# Patient Record
Sex: MALE | Race: WHITE | NOT HISPANIC OR LATINO | URBAN - METROPOLITAN AREA
[De-identification: names, ages, dates, MRNs, and addresses within clinical notes are randomized per-mention and may not be internally consistent; named-entity substitution may affect disease eponyms.]

---

## 2020-06-06 ENCOUNTER — INPATIENT (INPATIENT)
Facility: HOSPITAL | Age: 85
LOS: 5 days | Discharge: NURSING, REHAB OR LONG-TERM CARE | End: 2020-06-12
Attending: INTERNAL MEDICINE | Admitting: INTERNAL MEDICINE
Payer: MEDICARE

## 2020-06-06 VITALS
SYSTOLIC BLOOD PRESSURE: 123 MMHG | HEART RATE: 105 BPM | OXYGEN SATURATION: 97 % | RESPIRATION RATE: 18 BRPM | DIASTOLIC BLOOD PRESSURE: 68 MMHG

## 2020-06-06 DIAGNOSIS — Z95.1 PRESENCE OF AORTOCORONARY BYPASS GRAFT: Chronic | ICD-10-CM

## 2020-06-06 LAB
ALBUMIN SERPL ELPH-MCNC: 4.4 G/DL — SIGNIFICANT CHANGE UP (ref 3.5–5.2)
ALP SERPL-CCNC: 98 U/L — SIGNIFICANT CHANGE UP (ref 30–115)
ALT FLD-CCNC: 9 U/L — SIGNIFICANT CHANGE UP (ref 0–41)
ANION GAP SERPL CALC-SCNC: 14 MMOL/L — SIGNIFICANT CHANGE UP (ref 7–14)
APPEARANCE UR: CLEAR — SIGNIFICANT CHANGE UP
APTT BLD: 28.8 SEC — SIGNIFICANT CHANGE UP (ref 27–39.2)
AST SERPL-CCNC: 14 U/L — SIGNIFICANT CHANGE UP (ref 0–41)
BACTERIA # UR AUTO: NEGATIVE — SIGNIFICANT CHANGE UP
BASOPHILS # BLD AUTO: 0.03 K/UL — SIGNIFICANT CHANGE UP (ref 0–0.2)
BASOPHILS NFR BLD AUTO: 0.3 % — SIGNIFICANT CHANGE UP (ref 0–1)
BILIRUB SERPL-MCNC: 0.3 MG/DL — SIGNIFICANT CHANGE UP (ref 0.2–1.2)
BILIRUB UR-MCNC: NEGATIVE — SIGNIFICANT CHANGE UP
BLD GP AB SCN SERPL QL: SIGNIFICANT CHANGE UP
BUN SERPL-MCNC: 38 MG/DL — HIGH (ref 10–20)
CALCIUM SERPL-MCNC: 9.6 MG/DL — SIGNIFICANT CHANGE UP (ref 8.5–10.1)
CHLORIDE SERPL-SCNC: 101 MMOL/L — SIGNIFICANT CHANGE UP (ref 98–110)
CK MB CFR SERPL CALC: 1.9 NG/ML — SIGNIFICANT CHANGE UP (ref 0.6–6.3)
CK SERPL-CCNC: 39 U/L — SIGNIFICANT CHANGE UP (ref 0–225)
CO2 SERPL-SCNC: 23 MMOL/L — SIGNIFICANT CHANGE UP (ref 17–32)
COLOR SPEC: YELLOW — SIGNIFICANT CHANGE UP
CREAT SERPL-MCNC: 1.6 MG/DL — HIGH (ref 0.7–1.5)
DIFF PNL FLD: ABNORMAL
EOSINOPHIL # BLD AUTO: 0.04 K/UL — SIGNIFICANT CHANGE UP (ref 0–0.7)
EOSINOPHIL NFR BLD AUTO: 0.3 % — SIGNIFICANT CHANGE UP (ref 0–8)
EPI CELLS # UR: 1 /HPF — SIGNIFICANT CHANGE UP (ref 0–5)
GLUCOSE SERPL-MCNC: 141 MG/DL — HIGH (ref 70–99)
GLUCOSE UR QL: NEGATIVE — SIGNIFICANT CHANGE UP
HCT VFR BLD CALC: 38 % — LOW (ref 42–52)
HGB BLD-MCNC: 12 G/DL — LOW (ref 14–18)
HYALINE CASTS # UR AUTO: 8 /LPF — HIGH (ref 0–7)
IMM GRANULOCYTES NFR BLD AUTO: 0.6 % — HIGH (ref 0.1–0.3)
INR BLD: 1.04 RATIO — SIGNIFICANT CHANGE UP (ref 0.65–1.3)
KETONES UR-MCNC: NEGATIVE — SIGNIFICANT CHANGE UP
LEUKOCYTE ESTERASE UR-ACNC: NEGATIVE — SIGNIFICANT CHANGE UP
LYMPHOCYTES # BLD AUTO: 1.47 K/UL — SIGNIFICANT CHANGE UP (ref 1.2–3.4)
LYMPHOCYTES # BLD AUTO: 12.3 % — LOW (ref 20.5–51.1)
MCHC RBC-ENTMCNC: 29.5 PG — SIGNIFICANT CHANGE UP (ref 27–31)
MCHC RBC-ENTMCNC: 31.6 G/DL — LOW (ref 32–37)
MCV RBC AUTO: 93.4 FL — SIGNIFICANT CHANGE UP (ref 80–94)
MONOCYTES # BLD AUTO: 0.67 K/UL — HIGH (ref 0.1–0.6)
MONOCYTES NFR BLD AUTO: 5.6 % — SIGNIFICANT CHANGE UP (ref 1.7–9.3)
NEUTROPHILS # BLD AUTO: 9.66 K/UL — HIGH (ref 1.4–6.5)
NEUTROPHILS NFR BLD AUTO: 80.9 % — HIGH (ref 42.2–75.2)
NITRITE UR-MCNC: NEGATIVE — SIGNIFICANT CHANGE UP
NRBC # BLD: 0 /100 WBCS — SIGNIFICANT CHANGE UP (ref 0–0)
PH UR: 5.5 — SIGNIFICANT CHANGE UP (ref 5–8)
PLATELET # BLD AUTO: 119 K/UL — LOW (ref 130–400)
POTASSIUM SERPL-MCNC: 4.4 MMOL/L — SIGNIFICANT CHANGE UP (ref 3.5–5)
POTASSIUM SERPL-SCNC: 4.4 MMOL/L — SIGNIFICANT CHANGE UP (ref 3.5–5)
PROT SERPL-MCNC: 7.5 G/DL — SIGNIFICANT CHANGE UP (ref 6–8)
PROT UR-MCNC: ABNORMAL
PROTHROM AB SERPL-ACNC: 12 SEC — SIGNIFICANT CHANGE UP (ref 9.95–12.87)
RBC # BLD: 4.07 M/UL — LOW (ref 4.7–6.1)
RBC # FLD: 13.9 % — SIGNIFICANT CHANGE UP (ref 11.5–14.5)
RBC CASTS # UR COMP ASSIST: 7 /HPF — HIGH (ref 0–4)
SODIUM SERPL-SCNC: 138 MMOL/L — SIGNIFICANT CHANGE UP (ref 135–146)
SP GR SPEC: 1.03 — HIGH (ref 1.01–1.02)
TROPONIN T SERPL-MCNC: 0.02 NG/ML — HIGH
UROBILINOGEN FLD QL: SIGNIFICANT CHANGE UP
WBC # BLD: 11.94 K/UL — HIGH (ref 4.8–10.8)
WBC # FLD AUTO: 11.94 K/UL — HIGH (ref 4.8–10.8)
WBC UR QL: 1 /HPF — SIGNIFICANT CHANGE UP (ref 0–5)

## 2020-06-06 PROCEDURE — 99223 1ST HOSP IP/OBS HIGH 75: CPT | Mod: AI

## 2020-06-06 PROCEDURE — 93010 ELECTROCARDIOGRAM REPORT: CPT

## 2020-06-06 PROCEDURE — 71045 X-RAY EXAM CHEST 1 VIEW: CPT | Mod: 26

## 2020-06-06 PROCEDURE — 70450 CT HEAD/BRAIN W/O DYE: CPT | Mod: 26

## 2020-06-06 PROCEDURE — 99497 ADVNCD CARE PLAN 30 MIN: CPT | Mod: 25

## 2020-06-06 PROCEDURE — 99291 CRITICAL CARE FIRST HOUR: CPT | Mod: CS,GC

## 2020-06-06 RX ORDER — GABAPENTIN 400 MG/1
0 CAPSULE ORAL
Qty: 30 | Refills: 0 | DISCHARGE

## 2020-06-06 RX ORDER — RANITIDINE HYDROCHLORIDE 150 MG/1
0 TABLET, FILM COATED ORAL
Qty: 180 | Refills: 0 | DISCHARGE

## 2020-06-06 RX ORDER — LIPASE/PROTEASE/AMYLASE 16-48-48K
0 CAPSULE,DELAYED RELEASE (ENTERIC COATED) ORAL
Qty: 90 | Refills: 0 | DISCHARGE

## 2020-06-06 RX ORDER — FINASTERIDE 5 MG/1
1 TABLET, FILM COATED ORAL
Qty: 0 | Refills: 0 | DISCHARGE

## 2020-06-06 RX ORDER — LIPASE/PROTEASE/AMYLASE 16-48-48K
1 CAPSULE,DELAYED RELEASE (ENTERIC COATED) ORAL DAILY
Refills: 0 | Status: DISCONTINUED | OUTPATIENT
Start: 2020-06-06 | End: 2020-06-08

## 2020-06-06 RX ORDER — ASPIRIN/CALCIUM CARB/MAGNESIUM 324 MG
325 TABLET ORAL ONCE
Refills: 0 | Status: COMPLETED | OUTPATIENT
Start: 2020-06-06 | End: 2020-06-06

## 2020-06-06 RX ORDER — HEPARIN SODIUM 5000 [USP'U]/ML
5000 INJECTION INTRAVENOUS; SUBCUTANEOUS EVERY 8 HOURS
Refills: 0 | Status: DISCONTINUED | OUTPATIENT
Start: 2020-06-06 | End: 2020-06-12

## 2020-06-06 RX ORDER — PANTOPRAZOLE SODIUM 20 MG/1
40 TABLET, DELAYED RELEASE ORAL
Refills: 0 | Status: DISCONTINUED | OUTPATIENT
Start: 2020-06-06 | End: 2020-06-10

## 2020-06-06 RX ORDER — CARBIDOPA AND LEVODOPA 25; 100 MG/1; MG/1
1 TABLET ORAL
Qty: 0 | Refills: 0 | DISCHARGE

## 2020-06-06 RX ORDER — FOLIC ACID 0.8 MG
1 TABLET ORAL DAILY
Refills: 0 | Status: DISCONTINUED | OUTPATIENT
Start: 2020-06-06 | End: 2020-06-12

## 2020-06-06 RX ORDER — ROSUVASTATIN CALCIUM 5 MG/1
0 TABLET ORAL
Qty: 90 | Refills: 0 | DISCHARGE

## 2020-06-06 RX ORDER — FERROUS SULFATE 325(65) MG
325 TABLET ORAL DAILY
Refills: 0 | Status: DISCONTINUED | OUTPATIENT
Start: 2020-06-06 | End: 2020-06-12

## 2020-06-06 RX ORDER — ASPIRIN/CALCIUM CARB/MAGNESIUM 324 MG
81 TABLET ORAL DAILY
Refills: 0 | Status: DISCONTINUED | OUTPATIENT
Start: 2020-06-06 | End: 2020-06-10

## 2020-06-06 RX ORDER — ATORVASTATIN CALCIUM 80 MG/1
80 TABLET, FILM COATED ORAL AT BEDTIME
Refills: 0 | Status: DISCONTINUED | OUTPATIENT
Start: 2020-06-06 | End: 2020-06-12

## 2020-06-06 RX ORDER — SODIUM CHLORIDE 9 MG/ML
500 INJECTION, SOLUTION INTRAVENOUS ONCE
Refills: 0 | Status: COMPLETED | OUTPATIENT
Start: 2020-06-06 | End: 2020-06-06

## 2020-06-06 RX ORDER — CARBIDOPA AND LEVODOPA 25; 100 MG/1; MG/1
1 TABLET ORAL THREE TIMES A DAY
Refills: 0 | Status: DISCONTINUED | OUTPATIENT
Start: 2020-06-06 | End: 2020-06-12

## 2020-06-06 RX ORDER — FERROUS SULFATE 325(65) MG
1 TABLET ORAL
Qty: 0 | Refills: 0 | DISCHARGE

## 2020-06-06 RX ORDER — TAMSULOSIN HYDROCHLORIDE 0.4 MG/1
0.4 CAPSULE ORAL AT BEDTIME
Refills: 0 | Status: DISCONTINUED | OUTPATIENT
Start: 2020-06-06 | End: 2020-06-12

## 2020-06-06 RX ORDER — FOLIC ACID 0.8 MG
1 TABLET ORAL
Qty: 0 | Refills: 0 | DISCHARGE

## 2020-06-06 RX ORDER — FUROSEMIDE 40 MG
0 TABLET ORAL
Qty: 30 | Refills: 0 | DISCHARGE

## 2020-06-06 RX ORDER — NEOMYCIN SULFATE, POLYMYXIN B SULFATE AND HYDROCORTISONE 3.5; 10000; 1 MG/ML; [USP'U]/ML; MG/ML
0 SUSPENSION OPHTHALMIC
Qty: 7.5 | Refills: 0 | DISCHARGE

## 2020-06-06 RX ORDER — TAMSULOSIN HYDROCHLORIDE 0.4 MG/1
1 CAPSULE ORAL
Qty: 0 | Refills: 0 | DISCHARGE

## 2020-06-06 RX ORDER — METOPROLOL TARTRATE 50 MG
0 TABLET ORAL
Qty: 30 | Refills: 0 | DISCHARGE

## 2020-06-06 RX ORDER — SODIUM CHLORIDE 9 MG/ML
1000 INJECTION INTRAMUSCULAR; INTRAVENOUS; SUBCUTANEOUS
Refills: 0 | Status: DISCONTINUED | OUTPATIENT
Start: 2020-06-06 | End: 2020-06-08

## 2020-06-06 RX ORDER — LIPASE/PROTEASE/AMYLASE 16-48-48K
1 CAPSULE,DELAYED RELEASE (ENTERIC COATED) ORAL
Qty: 0 | Refills: 0 | DISCHARGE

## 2020-06-06 RX ORDER — MEMANTINE HYDROCHLORIDE 10 MG/1
10 TABLET ORAL DAILY
Refills: 0 | Status: DISCONTINUED | OUTPATIENT
Start: 2020-06-06 | End: 2020-06-12

## 2020-06-06 RX ORDER — FINASTERIDE 5 MG/1
5 TABLET, FILM COATED ORAL DAILY
Refills: 0 | Status: DISCONTINUED | OUTPATIENT
Start: 2020-06-06 | End: 2020-06-12

## 2020-06-06 RX ORDER — LACTULOSE 10 G/15ML
0 SOLUTION ORAL
Qty: 473 | Refills: 0 | DISCHARGE

## 2020-06-06 RX ORDER — FINASTERIDE 5 MG/1
0 TABLET, FILM COATED ORAL
Qty: 30 | Refills: 0 | DISCHARGE

## 2020-06-06 RX ORDER — CARBIDOPA AND LEVODOPA 25; 100 MG/1; MG/1
0 TABLET ORAL
Qty: 90 | Refills: 0 | DISCHARGE

## 2020-06-06 RX ADMIN — SODIUM CHLORIDE 75 MILLILITER(S): 9 INJECTION INTRAMUSCULAR; INTRAVENOUS; SUBCUTANEOUS at 23:00

## 2020-06-06 RX ADMIN — CARBIDOPA AND LEVODOPA 1 TABLET(S): 25; 100 TABLET ORAL at 22:59

## 2020-06-06 RX ADMIN — TAMSULOSIN HYDROCHLORIDE 0.4 MILLIGRAM(S): 0.4 CAPSULE ORAL at 22:59

## 2020-06-06 RX ADMIN — SODIUM CHLORIDE 500 MILLILITER(S): 9 INJECTION, SOLUTION INTRAVENOUS at 20:26

## 2020-06-06 RX ADMIN — ATORVASTATIN CALCIUM 80 MILLIGRAM(S): 80 TABLET, FILM COATED ORAL at 22:59

## 2020-06-06 NOTE — ED PROVIDER NOTE - ATTENDING CONTRIBUTION TO CARE
89yo M history of CAD CABG Parkinsons presenting with generalized weakness, AMS. Per family, has been weak for the past 2d but this evening after giving pt a laxative and pt having a BM, pt began to develop a L facial droop, stopped talking. No other complaints. No recent fevers, cough, nausea/vomiting. No anticoagulation.   Constitutional: chronically ill appearing Non toxic.   Eyes: PERRLA. Extraocular movements intact, no entrapment. Conjunctiva normal.   ENT: No nasal discharge. dry mucus membranes.  Neck: Supple, non tender, full range of motion.  CV: regular tachycardic no murmurs, rubs, or gallops. +S1S2.   Pulm: Clear to auscultation bilaterally. Normal work of breathing.  Abd: soft NT ND +BS.   Ext: Warm and well perfused x4, moving all extremities, no edema.   Skin: Warm, dry, no rash  Neuro: moving all extremities, diffuse tremors throughout, non verbal at this time

## 2020-06-06 NOTE — ED ADULT NURSE NOTE - OBJECTIVE STATEMENT
as per family, patient has not been speaking or answering questions all day. patient is normally verbal and oriented at baseline. patient follows commands but does not answer questions at this time.

## 2020-06-06 NOTE — H&P ADULT - ATTENDING COMMENTS
**HX and physical limited due to AMS. Supplemental information obtained from house staff, EMR, and family (Granddaughter) over the phone    89 YO M with a PMH of CAD s/p CABG, Parkinson's disease, Iron deficiency anemia, folic acid deficiency, and BPH who was BIBEMS for eval of AMS after walking to the bathroom and being found sitting on the toilet unresponsive and staring. No fall. No head trauma. When his famoly picked him up from the toilet he did have a BM. They also commented that it looked like his left face was drooping. In the ED, stroke code activation. CTH was WNLs. Neuro at bedside and wants pt admitted to stroke unit for neurochecks, MRI, MRA, and echo.     *exam limited due to excessive shaking  Physical exam shows pt with rhythmic shaking. VSS, afebrile, not hypoxic on RA. A&Ox0, will follow commands, aphasic. Mild left facial droop, difficult to assess as pt keeps moving uncontrollably. Rhythmic shaking of extremities. LEs without swelling. No rashes. Labs and radiology as above.    Aphasia, needs stroke rule vs seizure. Neuro is following. Stroke unit admit. MRI, MRI, and echo. Lipids/A1c. Neurochecks. EEG. PT/OT/S&S eval. Fall precautions.     NAVI vs CKD3. Doubt ATN. Send UA, urine lytes, and trend BMP. IVFs. Monitor urinary out-put. Renal/bladder US. Hold nephrotoxic agents.     Elevated troponin, likely from CKD. Doubt ACS. Serial cardiac enzymes and EKGs.     HX of CAD s/p CABG, Parkinson's disease, Iron deficiency anemia, folic acid deficiency, and BPH. Restart home meds. GI and DVT PPX. Inform PCP of pt's admission to hospital. Rest as per above note.

## 2020-06-06 NOTE — STROKE CODE NOTE - NIH STROKE SCALE: 1B. LOC QUESTIONS, QM
Call patient   Message Contents   Cristina Albrecht APRN Lucas, Amanda, MA             Please let pt know I refilled his lipitor 30 day supple with 11 refills.    Previous Messages      ----- Message -----   From: Sasha Mace APRN   Sent: 10/2/2019   1:00 PM   To: NIMCO Kelly, I was taking off the messages on the pharmacy line   This gentleman called for a refill of his lipitor 10 mg nightly. thanks         Comments     Spoke with patient's wife and she stated that they went to PCP and got prescription and they are aware of the one Cristina ordered for them       
(2) Answers neither question correctly

## 2020-06-06 NOTE — ED PROVIDER NOTE - OBJECTIVE STATEMENT
89y/o M w/ hx of CABG and parkinson has been having generalized weakness; Grandson Pardeep stating pt was well known yesterdays evening pt with droop to l. side of face as per family and dysarthric.  no fevers, cough, congestion, runny nose. no vomiting.  +diarrhea non bloody today.

## 2020-06-06 NOTE — H&P ADULT - HISTORY OF PRESENT ILLNESS
89 year old male patient with past medical history of CAD s/p CABG, Parkinson's disease, brought by EMS from home for altered mental status.   The history was provided by the grand-daughter Flor as the patient is unable to speak. She states that the patient has been complaining of generalized weakness for the past 2 days, he woke up this morning able to speak and walk. He has been having constipation so he took a laxative. The family noticed that he has been in the bathroom for a whole, when his daughter checked on him he was sitting on the toilet bowl, unable to talk, staring, unresponsive, the daughter also noticed left facial droop. He was also having severe diarrhea.     In the ED : /68,  / min, RR 18, SpO2 97 % on room air. Stroke code was called and the patient was found to have an NIHSS of 9 (unable to perform tasks, does not answer questions, global aphasia and severe dysarthria). CT Head no contrast was done and showed no evidence of intra-cranial hemorrhage. patient was not a tPA candidate because he was outside the time window and was not a candidate for IA intervention as his m-RS was 4. CBC showing mild leucocytosis and thrombocytopenia, BMP showing a creatinine of 1.6 (No baseline to compare). Chest x ray done and clear per my read (official report still pending). The patient was given Aspirin 325 mg and was admitted to the stroke unit for further workup 89 year old male patient with past medical history of CAD s/p CABG, Parkinson's disease, Iron deficiency anemia, folic acid deficiency, benign prostatic hyperplasia, brought by EMS from home for altered mental status.   The history was provided by the grand-daughter Flor as the patient is unable to speak. She states that the patient has been complaining of generalized weakness for the past 2 days, he woke up this morning able to speak and walk. He has been having constipation so he took a laxative. The family noticed that he has been in the bathroom for a whole, when his daughter checked on him he was sitting on the toilet bowl, unable to talk, staring, unresponsive, the daughter also noticed left facial droop. He was also having severe diarrhea.     In the ED : /68,  / min, RR 18, SpO2 97 % on room air. Stroke code was called and the patient was found to have an NIHSS of 9 (unable to perform tasks, does not answer questions, global aphasia and severe dysarthria). CT Head no contrast was done and showed no evidence of intra-cranial hemorrhage. patient was not a tPA candidate because he was outside the time window and was not a candidate for IA intervention as his m-RS was 4. CBC showing mild leucocytosis and thrombocytopenia, BMP showing a creatinine of 1.6 (No baseline to compare). Chest x ray done and clear per my read (official report still pending). The patient was given Aspirin 325 mg and was admitted to the stroke unit for further workup

## 2020-06-06 NOTE — ED PROVIDER NOTE - PHYSICAL EXAMINATION
VITAL SIGNS: I have reviewed nursing notes and confirm.  CONSTITUTIONAL: Well-developed; well-nourished; in no acute distress. pt comfortable.  SKIN: skin exam is warm and dry, no acute rash.   HEAD: Normocephalic; atraumatic.  EYES:  EOM intact; conjunctiva and sclera clear.  ENT: No nasal discharge; airway clear. moist oral mucosa; uvula at midline. no pharyngeal erythema, edema exudate or vesicles.  TMs with good light reflex b/l.  with no dentures  NECK: Supple; non tender.  CARD: S1, S2 normal; no murmurs, gallops, or rubs. Regular rate and rhythm. posterior tibial and radial pulses 2+  RESP: No wheezes, rales or rhonchi. cta b/l. no use of accessory muscles. no retractions  ABD: Normal bowel sounds; soft; non-distended; non-tender; no rebound. negative psoas, rovsign's and murphys.  EXT: Normal ROM. No  cyanosis or edema.  BACK: No cva tenderness  LYMPH: No acute cervical adenopathy.  NEURO: Alert, no focal deficit.  no .  CN 2-12 intact no droop noticed; pt without teeth unable to access facial droop; mouth foldings changes with pt leaning direction. sensory grossly intact to face, upper and lower extremity.  5/5 strength to , extension and flexion at elbow, flexion at hip, extension and flexion at knees. +pt following commands.  attempts to talk, but cannot get words out. pt with baseline shaking of extremities from parkinsons

## 2020-06-06 NOTE — GOALS OF CARE CONVERSATION - ADVANCED CARE PLANNING - CONVERSATION DETAILS
The pt is FULL CODE with no limitations in medical interventions.     Spoke to the Grand daughter over the phone, the wife has dementia, and the pt's daughter does not speak English. She advised that this was their wishes but she will reconfirm. I informed her that until we are told otherwise, the pt will be FULL CODE and she agreed with this plan.

## 2020-06-06 NOTE — ED PROVIDER NOTE - CLINICAL SUMMARY MEDICAL DECISION MAKING FREE TEXT BOX
89yo M history of CAD CABG Parkinsons presenting with generalized weakness, AMS. Per family, has been weak for the past 2d but this evening after giving pt a laxative and pt having a BM, pt began to develop a L facial droop, stopped talking. No other complaints. No recent fevers, cough, nausea/vomiting. No anticoagulation. stroke code called upon arrival. sp neuro eval. upon re discussion with family, last normal was yesterday, pt not tpa or intervention candidate. labs imaging reviewed. no baseline for cr. will admit for further eval

## 2020-06-06 NOTE — H&P ADULT - NSHPREVIEWOFSYSTEMS_GEN_ALL_CORE
Obtained by grand-daughter   General : + loss of appetite, no fever / chills, no unintentional weight loss   Pulmonary : No dyspnea, no cough   Cardiovascular : No chest pain, no palpitations   GI : + chronic diarrhea and abdominal pain, no nausea / vomiting    : No dysuria, no hematuria

## 2020-06-06 NOTE — CONSULT NOTE ADULT - SUBJECTIVE AND OBJECTIVE BOX
Stroke Progress Note:    ALEXY PURDY    1. Chief Complaint: Aphasia    HPI:  89 year old gentleman with a past medical history of parkinsons disease presents to the ED with inability to speak since waking on day of presentation. Stroke code called on arrival. NIHSS 9 for aphasia and inability to answer questions.   Out of the window for IV thrombolytics.     2. Relevant PMH:   Prior ischemic stroke/TIA[ ], Afib [ ], CAD [ ], HTN [ ], DLD [ ], DM [ ], PVD [ ], Obesity [ ],   Sedentary lifestyle [ ], CHF [ ], TEX [ ], Cancer Hx [ ].    3. Social History: Smoking [ ], Drug Use [ ], Alcohol Use [ ], Other [ ]    4. Possible Location of Stroke:  Unknown at this time will have a better understanding post stroke workup.   5. Relevant Brain Tissue Imaging:  < from: CT Brain Stroke Protocol (20 @ 19:24) >    IMPRESSION:     No evidence for acute intracranial hemorrhage, midline shift, or mass effect.    < end of copied text >    6. Relevant Cerebrovascular Imagin. Relevant blood tests:    pending  8. Relevant cardiac rhythm monitoring:  pending  9. Relevant Cardiac Structure: (TTE/MARIAH +/-):[ ]No intracardiac thrombus/[ ] no vegetation/[ ]no akynesia/EF:  pending  Home Medications:      MEDICATIONS  (STANDING):  aspirin 325 milliGRAM(s) Oral Once  lactated ringers Bolus 500 milliLiter(s) IV Bolus once      10. PT/OT/Speech/Rehab/S&Sw/ Cognitive eval results and recommendations:pending    11. Exam:    Vital Signs Last 24 Hrs  T(C): --  T(F): --  HR: 105 (2020 18:43) (105 - 105)  BP: 123/68 (2020 18:43) (123/68 - 123/68)  BP(mean): --  RR: 18 (2020 18:43) (18 - 18)  SpO2: 97% (2020 18:43) (97% - 97%)    Increased spasticity throughout.  Left arm tremors at rest.   Patient adentulous therefore examining facial asymmetry can be inconsistent.      12.   NIH STROKE SCALE  Item	                                                        Score  1 a.	Level of Consciousness	               	0  1 b. LOC Questions	                                2  1 c.	LOC Commands	                               	2  2.	Best Gaze	                                        0  3.	Visual	                                                0  4.	Facial Palsy	                                        0  5 a.	Motor Arm - Left	                                0  5 b.	Motor Arm - Right	                        0  6 a.	Motor Leg - Left	                                0  6 b.	Motor Leg - Right	                                0  7.	Limb Ataxia	                                        0  8.	Sensory	                                                0  9.	Language	                                        3  10.	Dysarthria	                                       2  11.	Extinction and Inattention  	        0  ______________________________________  TOTAL	                                                        9    Total NIHSS on admission: 9           mRS:4  0 No symptoms at all  1 No significant disability despite symptoms; able to carry out all usual duties and activities without assistance  2 Slight disability; unable to carry out all previous activities, but able to look after own affairs  3 Moderate disability; requiring some help, but able to walk without assistance  4 Moderately severe disability; unable to walk without assistance and unable to attend to own bodily needs without assistance  5 Severe disability; bedridden, incontinent and requiring constant nursing care and attention  6 Dead

## 2020-06-06 NOTE — STROKE CODE NOTE - ABSOLUTE EXCLUSION OTHER CRITERIA
Discussed with patients grandmary Roberto, last known well the previous evening. Not a candidate for IV thrombolytics. Also m-RS is >2 not a candidate for IA intervention. Discussed with patients grandson, he agreed and understands.

## 2020-06-06 NOTE — ED PROVIDER NOTE - NS ED ROS FT
family member providing ros    Constitutional: (-) fever  Eyes/ENT: (-) blurry vision, (-) epistaxis  Cardiovascular: (-) chest pain, (-) syncope  Respiratory: (-) cough, (-) shortness of breath  Gastrointestinal: (-) vomiting, (+) diarrhea  Musculoskeletal: (-) neck pain, (-) back pain, (-) joint pain  Integumentary: (-) rash, (-) edema  Neurological: (-) headache, (-) altered mental status  Psychiatric: (-) hallucinations  Allergic/Immunologic: (-) pruritus

## 2020-06-06 NOTE — ED PROVIDER NOTE - PROGRESS NOTE DETAILS
Pt parkinson's shaking interfearing with EKG. pt HR 90s and regular on assessment of pulse. pt unable to pass dysphagia exam.

## 2020-06-06 NOTE — ED ADULT TRIAGE NOTE - CHIEF COMPLAINT QUOTE
pt biba for ams all day as per family, pt has been nonverbal ,no improvement, pt still altered in amb bay. ( hx of blake)

## 2020-06-06 NOTE — ED PROVIDER NOTE - CRITICAL CARE PROVIDED
consult w/ pt's family directly relating to pts condition/additional history taking/documentation/interpretation of diagnostic studies/consultation with other physicians/direct patient care (not related to procedure)

## 2020-06-06 NOTE — H&P ADULT - NSHPPHYSICALEXAM_GEN_ALL_CORE
ICU Vital Signs Last 24 Hrs  T(C): --  T(F): --  HR: 105 (06 Jun 2020 20:45) (101 - 110)  BP: 139/72 (06 Jun 2020 20:45) (123/68 - 172/65)  BP(mean): --  ABP: --  ABP(mean): --  RR: 22 (06 Jun 2020 20:45) (18 - 24)  SpO2: 100% (06 Jun 2020 20:45) (97% - 100%)    General : No distress, alert and awake, unable to answer questions but does follow commands   Lungs : Poor inspiratory effort, no signs of respiratory distress, no wheezing or rhonchi   Cardiovascular : Regular S1S2 tachycardic   Abdomen : Soft nontender nondistended   Extremities : No edema   Neuro : Global aphasia, follows commands, no facial asymmetry, EOMI, moves extremities (wiggles his toes bilaterally, raises his hands but doesn't squeeze the fingers when asked). Bilateral upper extremity tremor

## 2020-06-06 NOTE — H&P ADULT - NSHPSOCIALHISTORY_GEN_ALL_CORE
Remote history of smoking, no alcohol use or drug use   Has a home health aide, he is able to walk with a walker but requires assistance with his ADLs

## 2020-06-06 NOTE — H&P ADULT - ASSESSMENT
89 year old male patient with past medical history of CAD s/p CABG, Parkinson's disease, brought by EMS from home for altered mental status.     # Altered mental status, global aphasia : suspected stroke vs toxic metabolic encephalopathy   - CT Head negative on admission, NIHSS 9, not a tPA candidate as outside the time window, not an IA candidate as m-RS > 2   - Admit to stroke unit   - Neuro-checks q4h   - Continue Aspirin 81 mgh daily + Lipitor 80 mg qhs   - Check MR head without contrast   - Check MRA Head without contrast and MRA neck with and without contrast   - Echocardiogram with bubble studies   - Check lipid panel, Hemoglobin A1C and TSH   - Aspiration, seizure and fall precautions   - DVT prophylaxis with Heparin 5000 units q8h   - Check routine EEG   - No focal consolidation on chest x ray, UA negative -> no obvious source of infection   - PT / OT consult   - Bedside swallow assessment was done, patient able to swallow apple juice but demonstrates dysphagia to liquids. For now give apple sauce for medication administration but will get a speech and swallow consult   - Permissive hypertension for 24 hours, will start NS 0.9 % @ 75 mL/h      # Parkinson's disease   - Continue     # CAD s/p CABG   - Continue Aspirin 81 mg qd + Lipitor 80 mg qhs +     # NAVI vs CKD ?  - Creatinine 1.6 on admission, no baseline to compare   - Check retroperitoneal US to rule out obstruction   - Monitor BMP and electrolytes   - Avoid hypotension and nephrotoxic medication     # Thrombocytopenia   - Monitor BMP if significantly down-trending stop heparin and consider hematology consult     # Elevated Troponin T   - per ED no ischemic changes on ECG   - No complaints of chest pain per grand-daughter, possible related to low GFR   - Trend Troponin   - Monitor on Telemetry     # Miscellaneous   - DVT prophylaxis : Heparin 5000 units sq q8h   - GI prophylaxis : Protonix 40 mg qd   - Activity : Increase as tolerated   - Diet : Full liquid   - Code status : Full code 89 year old male patient with past medical history of CAD s/p CABG, Parkinson's disease, brought by EMS from home for altered mental status.     # Altered mental status, global aphasia : suspected stroke vs toxic metabolic encephalopathy   - CT Head negative on admission, NIHSS 9, not a tPA candidate as outside the time window, not an IA candidate as m-RS > 2   - Admit to stroke unit   - Neuro-checks q4h   - Continue Aspirin 81 mgh daily + Lipitor 80 mg qhs   - Check MR head without contrast   - Check MRA Head without contrast and MRA neck with and without contrast   - Echocardiogram with bubble studies   - Check lipid panel, Hemoglobin A1C and TSH   - Aspiration, seizure and fall precautions   - DVT prophylaxis with Heparin 5000 units q8h   - Check routine EEG   - No focal consolidation on chest x ray, UA negative -> no obvious source of infection   - PT / OT consult   - Bedside swallow assessment was done, patient able to swallow apple juice but demonstrates dysphagia to liquids. For now give apple sauce for medication administration but will get a speech and swallow consult   - Permissive hypertension for 24 hours, will start NS 0.9 % @ 75 mL/h   - Hold Gabapentin      # Parkinson's disease   - Continue Carbidopa / levodopa 25/100 TID + Memantine 10 mg PO qd     # CAD s/p CABG   - Continue Aspirin 81 mg qd + Lipitor 80 mg qhs. Metoprolol succinate on hold for permissive hypertension in light of suspected acute ischemic stroke     # NAVI vs CKD ?  - Creatinine 1.6 on admission, no baseline to compare   - Check retroperitoneal US to rule out obstruction   - Monitor BMP and electrolytes   - Avoid hypotension and nephrotoxic medication     # Thrombocytopenia   - Monitor BMP if significantly down-trending stop heparin and consider hematology consult     # Benign prostatic hyperplasia   - Continue Flomax 0.4 mg qhs + Finasteride 5 mg qd     # GERD  - Takes Ranitidine and Dexilant at home. Continue Protonix 40 mg qhs in hospital     # Elevated Troponin T   - per ED no ischemic changes on ECG   - No complaints of chest pain per grand-daughter, possible related to low GFR   - Trend Troponin   - Monitor on Telemetry     # Multifactorial anemia  - Patient takes Ferrous sulfate and Folic acid at home. Continue in-hospital   - Check Vitamin B12, Folate levels and iron studies     # Miscellaneous   - DVT prophylaxis : Heparin 5000 units sq q8h   - GI prophylaxis : Protonix 40 mg qd   - Activity : Increase as tolerated   - Diet : Full liquid   - Code status : Full code     * Grand-daughter unsure of medication dosage and frequency, please call pharmacy and reconcile medication in AM * 89 year old male patient with past medical history of CAD s/p CABG, Parkinson's disease, brought by EMS from home for altered mental status.     # Altered mental status, global aphasia : suspected stroke vs toxic metabolic encephalopathy   - CT Head negative on admission, NIHSS 9, not a tPA candidate as outside the time window, not an IA candidate as m-RS > 2   - Admit to stroke unit   - Neuro-checks q4h   - Continue Aspirin 81 mgh daily + Lipitor 80 mg qhs   - Check MR head without contrast   - Check MRA Head without contrast and MRA neck with and without contrast   - Echocardiogram with bubble studies   - Check lipid panel, Hemoglobin A1C and TSH   - Aspiration, seizure and fall precautions   - DVT prophylaxis with Heparin 5000 units q8h   - Check routine EEG   - No focal consolidation on chest x ray, UA negative -> no obvious source of infection   - PT / OT consult   - Bedside swallow assessment was done, patient able to swallow apple juice but demonstrates dysphagia to liquids. For now give apple sauce for medication administration but will get a speech and swallow consult   - Permissive hypertension for 24 hours, will start NS 0.9 % @ 75 mL/h   - Hold Gabapentin      # Parkinson's disease   - Continue Carbidopa / levodopa 25/100 TID + Memantine 10 mg PO qd     # CAD s/p CABG   - Continue Aspirin 81 mg qd + Lipitor 80 mg qhs. Metoprolol succinate on hold for permissive hypertension in light of suspected acute ischemic stroke     # NAVI vs CKD ?  - Creatinine 1.6 on admission, no baseline to compare   - Check retroperitoneal US to rule out obstruction   - Monitor BMP and electrolytes   - Avoid hypotension and nephrotoxic medication     # Thrombocytopenia   - Monitor BMP if significantly down-trending stop heparin and consider hematology consult     # Benign prostatic hyperplasia   - Continue Flomax 0.4 mg qhs + Finasteride 5 mg qd     # GERD  - Takes Ranitidine and Dexilant at home. Continue Protonix 40 mg qhs in hospital     # Elevated Troponin T   - per ED no ischemic changes on ECG   - No complaints of chest pain per grand-daughter, possible related to low GFR   - Trend Troponin   - Monitor on Telemetry     # Multifactorial anemia  - Patient takes Ferrous sulfate and Folic acid at home. Continue in-hospital   - Check Vitamin B12, Folate levels and iron studies     # Miscellaneous   - DVT prophylaxis : Heparin 5000 units sq q8h   - GI prophylaxis : Protonix 40 mg qd   - Activity : Increase as tolerated   - Diet : Full liquid   - Code status : Full code     * Per family no known history of Diabetes, the patient takes 0.5 tabs of Janumet PRN if FS is high, but he tends to get hypoglycemic   * Family denies any history of chronic pancreatitis / pancreatic insufficiency even though patient is on Creon      * Grand-daughter unsure of medication dosage and frequency, please call pharmacy and reconcile medication in AM *

## 2020-06-06 NOTE — CONSULT NOTE ADULT - ASSESSMENT
13. Impression:  89 year old gentleman with a past medical history of parkinsons disease presents to the ED with inability to speak since waking on day of presentation. Stroke code called on arrival. NIHSS 9 for aphasia and inability to answer questions.   Out of the window for IV thrombolytics. On exam with Mongolian  patient able to follow commands such as look in the direction instructed, raise each extremity independently. Discussed with patients grandmary Roberto, patient needs help washing, needs meals prepared for him, ambulates with a walker. Patients modified frank scale is >2 therefore is not a candidate for IA intervention. Patients grandson agrees with plan. Discussed with Dr. Wilson.     14. Probable cause/s of Stroke:  Unknown at this time will have a better understanding post stroke workup.     15. Suggestions:   Routine stroke workup including:  MRI brain without yogi.   MRA head and neck without yogi.   TTE  LDL A1C  Telemetry monitoring.   Q4 neuro checks.     16. Disposition:  Stroke unit.     Raymundo Samuel NP  [phone]

## 2020-06-07 LAB
ALBUMIN SERPL ELPH-MCNC: 3.5 G/DL — SIGNIFICANT CHANGE UP (ref 3.5–5.2)
ALBUMIN SERPL ELPH-MCNC: 3.8 G/DL — SIGNIFICANT CHANGE UP (ref 3.5–5.2)
ALP SERPL-CCNC: 81 U/L — SIGNIFICANT CHANGE UP (ref 30–115)
ALP SERPL-CCNC: 85 U/L — SIGNIFICANT CHANGE UP (ref 30–115)
ALT FLD-CCNC: <5 U/L — SIGNIFICANT CHANGE UP (ref 0–41)
ALT FLD-CCNC: <5 U/L — SIGNIFICANT CHANGE UP (ref 0–41)
ANION GAP SERPL CALC-SCNC: 13 MMOL/L — SIGNIFICANT CHANGE UP (ref 7–14)
ANION GAP SERPL CALC-SCNC: 13 MMOL/L — SIGNIFICANT CHANGE UP (ref 7–14)
APTT BLD: 30.5 SEC — SIGNIFICANT CHANGE UP (ref 27–39.2)
AST SERPL-CCNC: 15 U/L — SIGNIFICANT CHANGE UP (ref 0–41)
AST SERPL-CCNC: 15 U/L — SIGNIFICANT CHANGE UP (ref 0–41)
BASOPHILS # BLD AUTO: 0.03 K/UL — SIGNIFICANT CHANGE UP (ref 0–0.2)
BASOPHILS NFR BLD AUTO: 0.4 % — SIGNIFICANT CHANGE UP (ref 0–1)
BILIRUB SERPL-MCNC: 0.4 MG/DL — SIGNIFICANT CHANGE UP (ref 0.2–1.2)
BILIRUB SERPL-MCNC: 0.5 MG/DL — SIGNIFICANT CHANGE UP (ref 0.2–1.2)
BUN SERPL-MCNC: 29 MG/DL — HIGH (ref 10–20)
BUN SERPL-MCNC: 32 MG/DL — HIGH (ref 10–20)
CALCIUM SERPL-MCNC: 9 MG/DL — SIGNIFICANT CHANGE UP (ref 8.5–10.1)
CALCIUM SERPL-MCNC: 9.1 MG/DL — SIGNIFICANT CHANGE UP (ref 8.5–10.1)
CHLORIDE SERPL-SCNC: 102 MMOL/L — SIGNIFICANT CHANGE UP (ref 98–110)
CHLORIDE SERPL-SCNC: 104 MMOL/L — SIGNIFICANT CHANGE UP (ref 98–110)
CHOLEST SERPL-MCNC: 165 MG/DL — SIGNIFICANT CHANGE UP (ref 100–200)
CK MB CFR SERPL CALC: 10.7 NG/ML — HIGH (ref 0.6–6.3)
CK MB CFR SERPL CALC: 6.3 NG/ML — SIGNIFICANT CHANGE UP (ref 0.6–6.3)
CK SERPL-CCNC: 290 U/L — HIGH (ref 0–225)
CO2 SERPL-SCNC: 22 MMOL/L — SIGNIFICANT CHANGE UP (ref 17–32)
CO2 SERPL-SCNC: 23 MMOL/L — SIGNIFICANT CHANGE UP (ref 17–32)
CREAT SERPL-MCNC: 1.2 MG/DL — SIGNIFICANT CHANGE UP (ref 0.7–1.5)
CREAT SERPL-MCNC: 1.2 MG/DL — SIGNIFICANT CHANGE UP (ref 0.7–1.5)
CULTURE RESULTS: SIGNIFICANT CHANGE UP
EOSINOPHIL # BLD AUTO: 0.03 K/UL — SIGNIFICANT CHANGE UP (ref 0–0.7)
EOSINOPHIL NFR BLD AUTO: 0.4 % — SIGNIFICANT CHANGE UP (ref 0–8)
GLUCOSE SERPL-MCNC: 103 MG/DL — HIGH (ref 70–99)
GLUCOSE SERPL-MCNC: 94 MG/DL — SIGNIFICANT CHANGE UP (ref 70–99)
HCT VFR BLD CALC: 32.6 % — LOW (ref 42–52)
HCT VFR BLD CALC: 34.2 % — LOW (ref 42–52)
HDLC SERPL-MCNC: 52 MG/DL — SIGNIFICANT CHANGE UP
HGB BLD-MCNC: 10.3 G/DL — LOW (ref 14–18)
HGB BLD-MCNC: 10.9 G/DL — LOW (ref 14–18)
IMM GRANULOCYTES NFR BLD AUTO: 0.3 % — SIGNIFICANT CHANGE UP (ref 0.1–0.3)
INR BLD: 1.12 RATIO — SIGNIFICANT CHANGE UP (ref 0.65–1.3)
IRON SATN MFR SERPL: 22 % — SIGNIFICANT CHANGE UP (ref 15–50)
IRON SATN MFR SERPL: 50 UG/DL — SIGNIFICANT CHANGE UP (ref 35–150)
LIPID PNL WITH DIRECT LDL SERPL: 106 MG/DL — SIGNIFICANT CHANGE UP (ref 4–129)
LYMPHOCYTES # BLD AUTO: 2.11 K/UL — SIGNIFICANT CHANGE UP (ref 1.2–3.4)
LYMPHOCYTES # BLD AUTO: 28.9 % — SIGNIFICANT CHANGE UP (ref 20.5–51.1)
MAGNESIUM SERPL-MCNC: 1.8 MG/DL — SIGNIFICANT CHANGE UP (ref 1.8–2.4)
MAGNESIUM SERPL-MCNC: 1.8 MG/DL — SIGNIFICANT CHANGE UP (ref 1.8–2.4)
MCHC RBC-ENTMCNC: 29.6 PG — SIGNIFICANT CHANGE UP (ref 27–31)
MCHC RBC-ENTMCNC: 29.9 PG — SIGNIFICANT CHANGE UP (ref 27–31)
MCHC RBC-ENTMCNC: 31.6 G/DL — LOW (ref 32–37)
MCHC RBC-ENTMCNC: 31.9 G/DL — LOW (ref 32–37)
MCV RBC AUTO: 93.7 FL — SIGNIFICANT CHANGE UP (ref 80–94)
MCV RBC AUTO: 93.7 FL — SIGNIFICANT CHANGE UP (ref 80–94)
MONOCYTES # BLD AUTO: 0.63 K/UL — HIGH (ref 0.1–0.6)
MONOCYTES NFR BLD AUTO: 8.6 % — SIGNIFICANT CHANGE UP (ref 1.7–9.3)
NEUTROPHILS # BLD AUTO: 4.48 K/UL — SIGNIFICANT CHANGE UP (ref 1.4–6.5)
NEUTROPHILS NFR BLD AUTO: 61.4 % — SIGNIFICANT CHANGE UP (ref 42.2–75.2)
NRBC # BLD: 0 /100 WBCS — SIGNIFICANT CHANGE UP (ref 0–0)
NRBC # BLD: 0 /100 WBCS — SIGNIFICANT CHANGE UP (ref 0–0)
PLATELET # BLD AUTO: 85 K/UL — LOW (ref 130–400)
PLATELET # BLD AUTO: 89 K/UL — LOW (ref 130–400)
POTASSIUM SERPL-MCNC: 4 MMOL/L — SIGNIFICANT CHANGE UP (ref 3.5–5)
POTASSIUM SERPL-MCNC: 4.2 MMOL/L — SIGNIFICANT CHANGE UP (ref 3.5–5)
POTASSIUM SERPL-SCNC: 4 MMOL/L — SIGNIFICANT CHANGE UP (ref 3.5–5)
POTASSIUM SERPL-SCNC: 4.2 MMOL/L — SIGNIFICANT CHANGE UP (ref 3.5–5)
PROT SERPL-MCNC: 6.2 G/DL — SIGNIFICANT CHANGE UP (ref 6–8)
PROT SERPL-MCNC: 6.7 G/DL — SIGNIFICANT CHANGE UP (ref 6–8)
PROTHROM AB SERPL-ACNC: 12.9 SEC — HIGH (ref 9.95–12.87)
RBC # BLD: 3.48 M/UL — LOW (ref 4.7–6.1)
RBC # BLD: 3.65 M/UL — LOW (ref 4.7–6.1)
RBC # FLD: 14 % — SIGNIFICANT CHANGE UP (ref 11.5–14.5)
RBC # FLD: 14.1 % — SIGNIFICANT CHANGE UP (ref 11.5–14.5)
SARS-COV-2 RNA SPEC QL NAA+PROBE: SIGNIFICANT CHANGE UP
SODIUM SERPL-SCNC: 138 MMOL/L — SIGNIFICANT CHANGE UP (ref 135–146)
SODIUM SERPL-SCNC: 139 MMOL/L — SIGNIFICANT CHANGE UP (ref 135–146)
SPECIMEN SOURCE: SIGNIFICANT CHANGE UP
TIBC SERPL-MCNC: 229 UG/DL — SIGNIFICANT CHANGE UP (ref 220–430)
TOTAL CHOLESTEROL/HDL RATIO MEASUREMENT: 3.2 RATIO — LOW (ref 4–5.5)
TRANSFERRIN SERPL-MCNC: 199 MG/DL — LOW (ref 200–360)
TRIGL SERPL-MCNC: 50 MG/DL — SIGNIFICANT CHANGE UP (ref 10–149)
TROPONIN T SERPL-MCNC: 0.02 NG/ML — HIGH
UIBC SERPL-MCNC: 179 UG/DL — SIGNIFICANT CHANGE UP (ref 110–370)
WBC # BLD: 6.16 K/UL — SIGNIFICANT CHANGE UP (ref 4.8–10.8)
WBC # BLD: 7.3 K/UL — SIGNIFICANT CHANGE UP (ref 4.8–10.8)
WBC # FLD AUTO: 6.16 K/UL — SIGNIFICANT CHANGE UP (ref 4.8–10.8)
WBC # FLD AUTO: 7.3 K/UL — SIGNIFICANT CHANGE UP (ref 4.8–10.8)

## 2020-06-07 PROCEDURE — 70544 MR ANGIOGRAPHY HEAD W/O DYE: CPT | Mod: 26,59

## 2020-06-07 PROCEDURE — 76770 US EXAM ABDO BACK WALL COMP: CPT | Mod: 26

## 2020-06-07 PROCEDURE — 99232 SBSQ HOSP IP/OBS MODERATE 35: CPT

## 2020-06-07 PROCEDURE — 70549 MR ANGIOGRAPH NECK W/O&W/DYE: CPT | Mod: 26

## 2020-06-07 PROCEDURE — 93010 ELECTROCARDIOGRAM REPORT: CPT

## 2020-06-07 PROCEDURE — 70551 MRI BRAIN STEM W/O DYE: CPT | Mod: 26

## 2020-06-07 PROCEDURE — 99233 SBSQ HOSP IP/OBS HIGH 50: CPT

## 2020-06-07 PROCEDURE — 99222 1ST HOSP IP/OBS MODERATE 55: CPT

## 2020-06-07 RX ORDER — METOPROLOL TARTRATE 50 MG
25 TABLET ORAL DAILY
Refills: 0 | Status: DISCONTINUED | OUTPATIENT
Start: 2020-06-07 | End: 2020-06-12

## 2020-06-07 RX ADMIN — Medication 81 MILLIGRAM(S): at 11:20

## 2020-06-07 RX ADMIN — FINASTERIDE 5 MILLIGRAM(S): 5 TABLET, FILM COATED ORAL at 11:15

## 2020-06-07 RX ADMIN — TAMSULOSIN HYDROCHLORIDE 0.4 MILLIGRAM(S): 0.4 CAPSULE ORAL at 22:35

## 2020-06-07 RX ADMIN — MEMANTINE HYDROCHLORIDE 10 MILLIGRAM(S): 10 TABLET ORAL at 11:15

## 2020-06-07 RX ADMIN — CARBIDOPA AND LEVODOPA 1 TABLET(S): 25; 100 TABLET ORAL at 05:04

## 2020-06-07 RX ADMIN — HEPARIN SODIUM 5000 UNIT(S): 5000 INJECTION INTRAVENOUS; SUBCUTANEOUS at 05:04

## 2020-06-07 RX ADMIN — Medication 25 MILLIGRAM(S): at 15:56

## 2020-06-07 RX ADMIN — HEPARIN SODIUM 5000 UNIT(S): 5000 INJECTION INTRAVENOUS; SUBCUTANEOUS at 22:35

## 2020-06-07 RX ADMIN — Medication 10 MILLIGRAM(S): at 10:13

## 2020-06-07 RX ADMIN — CARBIDOPA AND LEVODOPA 1 TABLET(S): 25; 100 TABLET ORAL at 15:55

## 2020-06-07 RX ADMIN — Medication 325 MILLIGRAM(S): at 11:15

## 2020-06-07 RX ADMIN — CARBIDOPA AND LEVODOPA 1 TABLET(S): 25; 100 TABLET ORAL at 22:35

## 2020-06-07 RX ADMIN — Medication 1 CAPSULE(S): at 11:21

## 2020-06-07 RX ADMIN — SODIUM CHLORIDE 75 MILLILITER(S): 9 INJECTION INTRAMUSCULAR; INTRAVENOUS; SUBCUTANEOUS at 10:19

## 2020-06-07 RX ADMIN — Medication 10 MILLIGRAM(S): at 15:56

## 2020-06-07 RX ADMIN — Medication 1 MILLIGRAM(S): at 11:15

## 2020-06-07 RX ADMIN — ATORVASTATIN CALCIUM 80 MILLIGRAM(S): 80 TABLET, FILM COATED ORAL at 22:35

## 2020-06-07 RX ADMIN — HEPARIN SODIUM 5000 UNIT(S): 5000 INJECTION INTRAVENOUS; SUBCUTANEOUS at 15:40

## 2020-06-07 NOTE — PHYSICAL THERAPY INITIAL EVALUATION ADULT - LIVES WITH, PROFILE
spouse/children/As per pt report pt lives with spouse and daughter As per pt report pt lives with spouse and daughter, in apt with + elevator/spouse/children

## 2020-06-07 NOTE — PHYSICAL THERAPY INITIAL EVALUATION ADULT - MANUAL MUSCLE TESTING RESULTS, REHAB EVAL
R UE's: shoulder flex 3+/5. R elbow 3/5.  3/5. L UE at least 3+/5. B LE's at least 3/5. R UE's: shoulder flex, elbow flex,  3+/5. L UE at least 4-/5. B LE's at least 3/5.

## 2020-06-07 NOTE — PROGRESS NOTE ADULT - ASSESSMENT
Altered mental status a/w global aphasia  - DDx: suspected stroke vs. less likely toxic/metabolic encephalopathy   - CT Head negative on admission, NIHSS 9; Not a tPA (outside window)' Not an IA candidate (m-RS > 2)  - Continue ASA 81mg QD and Atorvastatin 80mg QHS   - Home Gabapentin held; Continue to hold to better assess mentation  - Will follow up with MR imaging as recommended by Neurology  - TTE w/ Bubble ordered on admission; Will hold off for now, limited benefit  - Follow up Lipid panel, TSH, and A1c  - Continue Neurochecks Q4H  - Aspiration, Seizure and Fall precautions  - Bedside dysphagia failed; Follow up S+S   - PT / OT; Will need placement    Elevated Troponin T; Likely chronic  - Likely due to CKD; CKMB was elevated x1 with no events of EKG changes  - Will continue to follow up with Cardiac enzymes; Monitor on telemetry    Acute Kidney Injury; Hx of CKD  - Creatinine 1.6 on admission; Now improved to 1.2 (diluted? - Drop in CBC results)  - Check retroperitoneal US to rule out obstruction   - Monitor BMP and electrolytes   - Avoid hypotension and nephrotoxic medication     Thrombocytopenia; Hx of Multifactorial anemia  - Assuming AM CBC is diluted; Will get a repeat this morning and follow up  - Continue home Ferrous sulfate and Folic acid  - Follow up Vitamin B12 and B9 levels; iron studies    Hx of Parkinson's disease: Continue Carbidopa/Levodopa 25/100 TID + Memantine QD  Hx of CAD s/p CABG: Continue ASA and Statin; Held Metoptolol for permissive HTN; Resume when indicated  Hx of Benign prostatic hyperplasia: Continue Flomax 0.4mg QHS + Finasteride 5mg QD  Hx of GERD: Continue Protonix; Takes Ranitidine and Dexilant at home    GI ppx:   Protonix  DVT ppx:   Heparin SubQ  Activity:   As Tolerated   DISPO:  Patient to be discharged when condition(s) optimized.    CODE STATUS:   FULL Altered mental status a/w global aphasia  - DDx: suspected stroke vs. less likely toxic/metabolic encephalopathy   - CT Head negative on admission, NIHSS 9; Not a tPA (outside window)' Not an IA candidate (m-RS > 2)  - Continue ASA 81mg QD and Atorvastatin 80mg QHS   - Home Gabapentin held; Continue to hold to better assess mentation  - Will follow up with MR imaging as recommended by Neurology  - TTE w/ Bubble ordered on admission; Will hold off for now, limited benefit  - Follow up Lipid panel, TSH, and A1c  - Continue Neurochecks Q4H  - Aspiration, Seizure and Fall precautions  - Bedside dysphagia failed; Follow up S+S   - PT / OT; Will need placement    Elevated Troponin T; Likely chronic  - Likely due to CKD; CKMB was elevated x1 with no events of EKG changes  - Will continue to follow up with Cardiac enzymes; Monitor on telemetry    Atrial flutter; Now NSR  - Occurred overnight on 6/7/2020: Self resolved  - Full AC not initiated during workup for ischemic stroke  - Will continue to monitor on telemetry given elevated CKMB    Acute Kidney Injury; Hx of CKD  - Creatinine 1.6 on admission; Now improved to 1.2 (diluted? - Drop in CBC results)  - Check retroperitoneal US to rule out obstruction   - Monitor BMP and electrolytes   - Avoid hypotension and nephrotoxic medication     Thrombocytopenia; Hx of Multifactorial anemia  - Assuming AM CBC is diluted; Will get a repeat this morning and follow up  - Continue home Ferrous sulfate and Folic acid  - Follow up Vitamin B12 and B9 levels; iron studies    Hx of Parkinson's disease: Continue Carbidopa/Levodopa 25/100 TID + Memantine QD  Hx of CAD s/p CABG: Continue ASA and Statin; Held Metoptolol for permissive HTN; Resume when indicated  Hx of Benign prostatic hyperplasia: Continue Flomax 0.4mg QHS + Finasteride 5mg QD  Hx of GERD: Continue Protonix; Takes Ranitidine and Dexilant at home    GI ppx:   Protonix  DVT ppx:   Heparin SubQ  Activity:   As Tolerated   DISPO:  Patient to be discharged when condition(s) optimized.    CODE STATUS:   FULL Altered mental status a/w global aphasia  - DDx: suspected stroke vs. less likely toxic/metabolic encephalopathy   - CT Head negative on admission, NIHSS 9; Not a tPA (outside window)' Not an IA candidate (m-RS > 2)  - Continue ASA 81mg QD and Atorvastatin 80mg QHS   - Home Gabapentin held; Continue to hold to better assess mentation  - Will follow up with MR imaging as recommended by Neurology  - TTE w/ Bubble ordered on admission  - Follow up Lipid panel, TSH, and A1c  - Continue Neurochecks Q4H  - Aspiration, Seizure and Fall precautions  - Bedside dysphagia failed; Follow up S+S   - PT / OT; Will need placement    Elevated Troponin T; Likely chronic  - Likely due to CKD; CKMB was elevated x1 with no events of EKG changes  - Will continue to follow up with Cardiac enzymes; Monitor on telemetry    Atrial flutter; Now NSR  - Occurred overnight on 6/7/2020: Self resolved  - Full AC not initiated during workup for ischemic stroke; Harm may outweigh benefit given Parkinson's and risk of falls  - Will continue to monitor on telemetry given elevated CKMB    Acute Kidney Injury; Hx of CKD  - Creatinine 1.6 on admission; Now improved to 1.2 (diluted? - Drop in CBC results)  - Check retroperitoneal US to rule out obstruction   - Monitor BMP and electrolytes   - Avoid hypotension and nephrotoxic medication     Thrombocytopenia; Hx of Multifactorial anemia  - Assuming AM CBC is diluted; Will get a repeat this morning and follow up  - Continue home Ferrous sulfate and Folic acid  - Follow up Vitamin B12 and B9 levels; iron studies    Hx of Parkinson's disease: Continue Carbidopa/Levodopa 25/100 TID + Memantine QD  Hx of CAD s/p CABG: Continue ASA and Statin; Held Metoptolol for permissive HTN; Resume when indicated  Hx of Benign prostatic hyperplasia: Continue Flomax 0.4mg QHS + Finasteride 5mg QD  Hx of GERD: Continue Protonix; Takes Ranitidine and Dexilant at home    GI ppx:   Protonix  DVT ppx:   Heparin SubQ  Activity:   As Tolerated   DISPO:  Patient to be discharged when condition(s) optimized.    CODE STATUS:   FULL

## 2020-06-07 NOTE — CONSULT NOTE ADULT - SUBJECTIVE AND OBJECTIVE BOX
Patient is a 89y old  Male who presents with a chief complaint of Altered Mental Status (2020 11:04)    HPI:  89 year old male patient with past medical history of CAD s/p CABG, Parkinson's disease, Iron deficiency anemia, folic acid deficiency, benign prostatic hyperplasia, brought by EMS from home for altered mental status.   The history was provided by the grand-daughter Flor as the patient is unable to speak. She states that the patient has been complaining of generalized weakness for the past 2 days, he woke up this morning able to speak and walk. He has been having constipation so he took a laxative. The family noticed that he has been in the bathroom for a whole, when his daughter checked on him he was sitting on the toilet bowl, unable to talk, staring, unresponsive, the daughter also noticed left facial droop. He was also having severe diarrhea.     In the ED : /68,  / min, RR 18, SpO2 97 % on room air. Stroke code was called and the patient was found to have an NIHSS of 9 (unable to perform tasks, does not answer questions, global aphasia and severe dysarthria). CT Head no contrast was done and showed no evidence of intra-cranial hemorrhage. patient was not a tPA candidate because he was outside the time window and was not a candidate for IA intervention as his m-RS was 4. CBC showing mild leucocytosis and thrombocytopenia, BMP showing a creatinine of 1.6 (No baseline to compare). Chest x ray done and clear per my read (official report still pending). The patient was given Aspirin 325 mg and was admitted to the stroke unit for further workup (2020 21:06)      PAST MEDICAL & SURGICAL HISTORY:  Coronary artery disease  Parkinson disease  S/P CABG (coronary artery bypass graft)      Hospital Course:  suspected stroke vs. less likely toxic/metabolic encephalopathy   - CT Head negative on admission, NIHSS 9; Not a tPA (outside window)' Not an IA candidate (m-RS > 2)  - Continue ASA 81mg QD and Atorvastatin 80mg QHS   - Home Gabapentin held; Continue to hold to better assess mentation  - Will follow up with MR imaging as recommended by Neurology  - TTE w/ Bubble ordered on admission; Will hold off for now, limited benefit  - Follow up Lipid panel, TSH, and A1c  - Continue Neurochecks Q4H  - Aspiration, Seizure and Fall precautions  - Bedside dysphagia failed; Follow up S+S     Elevated Troponin T; Likely chronic  - Likely due to CKD; CKMB was elevated x1 with no events of EKG changes  - Will continue to follow up with Cardiac enzymes; Monitor on telemetry    Atrial flutter; Now NSR  - Full AC not initiated during workup for ischemic stroke  - Will continue to monitor on telemetry given elevated CKMB    Acute Kidney Injury; Hx of CKD  - Creatinine 1.6 on admission; Now improved to 1.2 (diluted? - Drop in CBC results)  - Check retroperitoneal US to rule out obstruction   - Monitor BMP and electrolytes   - Avoid hypotension and nephrotoxic medication     Thrombocytopenia; Hx of Multifactorial anemia  - Assuming AM CBC is diluted; Will get a repeat this morning and follow up  - Continue home Ferrous sulfate and Folic acid  - Follow up Vitamin B12 and B9 levels; iron studies    Hx of Parkinson's disease: Continue Carbidopa/Levodopa 25/100 TID + Memantine QD  Hx of CAD s/p CABG: Continue ASA and Statin; Held Metoptolol for permissive HTN; Resume when indicated  Hx of Benign prostatic hyperplasia: Continue Flomax 0.4mg QHS + Finasteride 5mg QD  Hx of GERD: Continue Protonix; Takes Ranitidine and Dexilant at home      TODAY'S SUBJECTIVE & REVIEW OF SYMPTOMS:     Constitutional WNL   Cardio WNL   Resp WNL    GI WNL  Heme WNL  Endo WNL  Skin WNL  MSK WNL  Neuro difficult swallowing / difficuly expressing language  Cognitive WNL  Psych WNL      MEDICATIONS  (STANDING):  aspirin  chewable 81 milliGRAM(s) Oral daily  atorvastatin 80 milliGRAM(s) Oral at bedtime  carbidopa/levodopa  25/100 1 Tablet(s) Oral three times a day  dicyclomine 10 milliGRAM(s) Oral three times a day before meals  ferrous    sulfate 325 milliGRAM(s) Oral daily  finasteride 5 milliGRAM(s) Oral daily  folic acid 1 milliGRAM(s) Oral daily  heparin   Injectable 5000 Unit(s) SubCutaneous every 8 hours  memantine 10 milliGRAM(s) Oral daily  pancrelipase  (CREON 36,000 Lipase Units) 1 Capsule(s) Oral daily  pantoprazole    Tablet 40 milliGRAM(s) Oral before breakfast  sodium chloride 0.9%. 1000 milliLiter(s) (75 mL/Hr) IV Continuous <Continuous>  tamsulosin 0.4 milliGRAM(s) Oral at bedtime    MEDICATIONS  (PRN):      FAMILY HISTORY:      Allergies    No Known Allergies    Intolerances        SOCIAL HISTORY:    [    ] Etoh  [    ] Smoking  [    ] Substance abuse     Home Environment:  [    ] Home Alone  [ x   ] Lives with Family Spouse and daughter  [  x  ] Home Health Aid    Dwelling:  [    ] Apartment  [    ] Private House  [    ] Adult Home  [    ] Skilled Nursing Facility      [    ] Short Term  [    ] Long Term  [    ] Stairs                           [    ] Elevator     FUNCTIONAL STATUS PTA: (Check all that apply)  Ambulation: [     ]Independent    [ x   ] Dependent     [    ] Non-Ambulatory  Assistive Device: [    ] SA Cane  [    ]  Q Cane  [ x   ] Walker  [    ]  Wheelchair  ADL : [    ] Independent  [   x ]  Dependent       Vital Signs Last 24 Hrs  T(C): 36.4 (2020 07:28), Max: 36.4 (2020 07:28)  T(F): 97.5 (2020 07:28), Max: 97.5 (2020 07:28)  HR: 60 (2020 07:28) (60 - 110)  BP: 127/69 (2020 07:28) (116/56 - 172/65)  BP(mean): --  RR: 20 (2020 07:28) (18 - 24)  SpO2: 97% (2020 07:28) (97% - 100%)      PHYSICAL EXAM: Alert & Oriented X3 + exp aphasia  GENERAL: NAD, well-groomed, well-developed  HEAD:  Atraumatic, Normocephalic  EYES: EOMI, PERRLA, conjunctiva and sclera clear  NECK: Supple  CHEST/LUNG: Clear bilaterally  HEART: irreg irreg  ABDOMEN: Soft, Nontender, Nondistended; Bowel sounds present  EXTREMITIES:  no calf tenderness,no edema BLES    NERVOUS SYSTEM:  Cranial Nerves 2-12 intact [    ] Abnormal  [ x   ]L facial  ROM: WFL all extremities [ x   ]  Abnormal [     ]  Motor Strength: WFL all extremities  [  x  ]  Abnormal [    ]  Sensation: intact to light touch [   x ] Abnormal [    ]  +rigidity /Pin Rolling tremors  FUNCTIONAL STATUS:  Bed Mobility: [   ]  Independent [    ]  Supervision [   x ]  Needs Assistance [  ]  N/A  Transfers: [    ]  Independent [    ]  Supervision [ x   ]  Needs Assistance [    ]  N/A    Ambulation:  [    ]  Independent [    ]  Supervision [    ]  Needs Assistance [    ]  N/A   ADL:  [    ]   Independent [  x  ] Requires Assistance [    ] N/A   MOD/MAX A TRANSFERS, MOD/MAXA  RW 7'    LABS:                        10.3   7.30  )-----------( 89       ( 2020 05:26 )             32.6     06-07    139  |  104  |  32<H>  ----------------------------<  94  4.2   |  22  |  1.2    Ca    9.0      2020 05:26  Mg     1.8     06-    TPro  6.2  /  Alb  3.5  /  TBili  0.4  /  DBili  x   /  AST  15  /  ALT  <5  /  AlkPhos  81  06-07    PT/INR - ( 2020 05:26 )   PT: 12.90 sec;   INR: 1.12 ratio         PTT - ( 2020 05:26 )  PTT:30.5 sec  Urinalysis Basic - ( 2020 20:24 )    Color: Yellow / Appearance: Clear / S.028 / pH: x  Gluc: x / Ketone: Negative  / Bili: Negative / Urobili: <2 mg/dL   Blood: x / Protein: 30 mg/dL / Nitrite: Negative   Leuk Esterase: Negative / RBC: 7 /HPF / WBC 1 /HPF   Sq Epi: x / Non Sq Epi: 1 /HPF / Bacteria: Negative        RADIOLOGY & ADDITIONAL STUDIES:

## 2020-06-07 NOTE — OCCUPATIONAL THERAPY INITIAL EVALUATION ADULT - NS ASR FOLLOW COMMAND OT EVAL
pt presents with min-mod expressive aphasia/100% of the time/able to follow single-step instructions

## 2020-06-07 NOTE — ED ADULT NURSE REASSESSMENT NOTE - NS ED NURSE REASSESS COMMENT FT1
pt aox3, in no acute distress. on cardiac monitor. neuro assessment documented in flow sheet. no acute changes in neuro status. made comfortable, condom cath in place. Will continue to monitor / assess.

## 2020-06-07 NOTE — CONSULT NOTE ADULT - ASSESSMENT
89 yo M h/o CAD/CABG, Parkinson ds, dementia p/w AMS    Encephalopathy r/o CVA  Atrial fibrillation [id number] - 4 (unknown LV function)  H/O CAD  Dementia/PD    CE mildly elevated  EKG afib    -Supportive care  -Awaiting MRI   -Resume home dose Toprol XL 25 mg   -2D echo to assess baseline LV function  -PT/OT assessment of gait  -Need to readdress AC once cleared by neurology after assessing stability of gait and discussing with family  -Will d/w attending 89 yo M h/o CAD/CABG, Parkinson ds, dementia p/w AMS    Encephalopathy r/o CVA  Atrial fibrillation [id number] - 4 (unknown LV function)  H/O CAD  Dementia/PD    CE mildly elevated  EKG afib    -Supportive care  -Awaiting MRI   -Resume home dose Toprol XL 25 mg  -Not on AC due to possibility of acute CVA   -2D echo to assess baseline LV function  -PT/OT assessment of gait  -Need to readdress AC once cleared by neurology after assessing gait stability and discussing risk/benefit with family  -Will d/w attending

## 2020-06-07 NOTE — PHYSICAL THERAPY INITIAL EVALUATION ADULT - PERTINENT HX OF CURRENT PROBLEM, REHAB EVAL
brought by EMS from home for altered mental status. As per grand-dtr Flor pt had generalized weakness for 3 day, severe diarrhea, when was in bathroom noticed pt was unable to talk , unresponsive and had L facial droop.

## 2020-06-07 NOTE — SWALLOW BEDSIDE ASSESSMENT ADULT - ORAL PHASE
Within functional limits Lingual stasis/mild, cleared w/ liquid wash Lingual stasis/moderate, reduced w/ liquid wash

## 2020-06-07 NOTE — PROGRESS NOTE ADULT - ASSESSMENT
13. Impression:  89 year old gentleman with a past medical history of parkinsons disease presents to the ED with inability to speak since waking on day of presentation. Stroke code called on arrival. NIHSS 9 for aphasia and inability to answer questions.   Out of the window for IV thrombolytics. Patients modified frank scale is >2 therefore is not a candidate for IA intervention. Today following commands without focal weakness, patient with persistent expressive aphasia. **Prelim MRI brain with left temporal acute infarcts.     14. Probable cause/s of Stroke:  Unknown at this time will have a better understanding post stroke workup.     15. Suggestions:   Routine stroke workup including:  Follow up MRI brain without yogi.   Follow up MRA head and neck without yogi.   TTE  A1C  Telemetry monitoring.   Q4 neuro checks.     Continue ASA and Lipitor.    16. Disposition:  Stroke unit.     Raymundo Samuel NP  [phone]. Impression:  89 year old gentleman with a past medical history of parkinsons disease presents to the ED with inability to speak since waking on day of presentation. Stroke code called on arrival. NIHSS 9 for aphasia and inability to answer questions.   Out of the window for IV thrombolytics. Patients modified frank scale is >2 therefore is not a candidate for IA intervention. Today following commands without focal weakness, patient with persistent expressive aphasia. **Prelim MRI brain with left temporal acute infarcts.     14. Probable cause/s of Stroke:  Unknown at this time will have a better understanding post stroke workup.     15. Suggestions:   Routine stroke workup including:  Follow up MRA head and neck without yogi.   TTE  A1C  Telemetry monitoring.   Q4 neuro checks.     Continue ASA and Lipitor.    16. Disposition:  Stroke unit.     Raymundo Samuel NP  [phone]

## 2020-06-07 NOTE — CHART NOTE - NSCHARTNOTEFT_GEN_A_CORE
Called by RN ~1130 PM that pt HR as fluctuating, EKG obtained which shows a-flutter. No hx of arrythmia as per family. HR returned wnl with no intervention. In light of suspected stroke will hold off therapeutic AC for now. Cardio consult placed, f/u recs. Called by RN ~1130 PM that pt HR as fluctuating, EKG obtained which shows a-flutter. No hx of arrythmia as per family. HR returned wnl with no intervention. In light of suspected stroke will hold off therapeutic AC for now. Cardio consult placed, f/u recs.      Attending attestation:   I was called to bedside by RN and she informed me that they were able to get an EKG on the pt that did not have significant artifact due to shaking. The EKG revealed atrial flutter which is a new finding for the pt. In setting of possible ischemic stroke, the current recommendation is to wait 24-48 hours before initiating therapeutic AC due to increased risk of hemorrhagic conversion. Pt is hemodynamically stable. TSH, Echo, and cardio consult placed.

## 2020-06-07 NOTE — PHYSICAL THERAPY INITIAL EVALUATION ADULT - GENERAL OBSERVATIONS, REHAB EVAL
10:20-11:05. Pt encountered semifowler in bed in NAD,  + IV R UE, +tele, agreeable to PT. Shana CASTELLANOS present at b/s. Pt is [place] speaking however  was not needed due to [place] speaking PT conducting PT Eval. 10:20-11:05. Pt encountered semifowler in bed in NAD,  + IV R UE, +tele, agreeable to PT. Shana CASTELLANOS present at b/s. Pt is Mongolian speaking however  was not needed due to Mongolian speaking PT conducting PT Eval. /90mmHg, HR 61 bpm. Pt had elevated HR post amb up to 125 bpm. RN notified. After few min HR decreased between 80-90's. /57

## 2020-06-07 NOTE — PROGRESS NOTE ADULT - ATTENDING COMMENTS
I have personally seen and examined this patient on 6/7 I have fully participated in the care of this patient.  I have reviewed all pertinent clinical information, including history, physical exam, plan and note. Patient is still aphasic, No significant weakness or reduced sensation on exam. MRA Brain showed left temporal infarct. MRA official report is pending. Likely A-A thromboembolism. Continue ASA and Lipitor. Cardiac monitoring. PT/OT. Stroke unit w q4h neuro check. I have reviewed all pertinent clinical information and reviewed all relevant imaging and diagnostic studies personally.  Recommendations as above.  Agree with above assessment except as noted.

## 2020-06-07 NOTE — PROGRESS NOTE ADULT - ASSESSMENT
a/p:    #AMS-Aphasia- r/o stroke vs seizure  -CT head negative  -remains in ED but admitted to stroke unit  -check hba1c, echo, lipid profile  -f/u EEG  -neurology following  -f/u MRI/MRA  -cont asa, statin  -no tpa given (out of window)  -PT/OT/sp swallow eval    #new onset AF/Aflutter  -tele monitoring  -cardiology consult  -rate control (cont with bblocker- metoprolol)  -will not fully AC until mri/workup done    # NAVI on CKD3  -monitor bmp  -ua small blood/protein- f/u ulytes  -f/u renal/bladder US    #parkinsons disease  -cont sinemet    #DVT/GI ppx  FULL CODE    guarded prognosis

## 2020-06-07 NOTE — PROGRESS NOTE ADULT - SUBJECTIVE AND OBJECTIVE BOX
Patient is a 89y old  Male who presents with a chief complaint of Altered Mental Status (2020 11:56)    HPI:  89 year old male patient with past medical history of CAD s/p CABG, Parkinson's disease, Iron deficiency anemia, folic acid deficiency, benign prostatic hyperplasia, brought by EMS from home for altered mental status.   The history was provided by the grand-daughter Flor as the patient is unable to speak. She states that the patient has been complaining of generalized weakness for the past 2 days, he woke up this morning able to speak and walk. He has been having constipation so he took a laxative. The family noticed that he has been in the bathroom for a whole, when his daughter checked on him he was sitting on the toilet bowl, unable to talk, staring, unresponsive, the daughter also noticed left facial droop. He was also having severe diarrhea.     In the ED : /68,  / min, RR 18, SpO2 97 % on room air. Stroke code was called and the patient was found to have an NIHSS of 9 (unable to perform tasks, does not answer questions, global aphasia and severe dysarthria). CT Head no contrast was done and showed no evidence of intra-cranial hemorrhage. patient was not a tPA candidate because he was outside the time window and was not a candidate for IA intervention as his m-RS was 4. CBC showing mild leucocytosis and thrombocytopenia, BMP showing a creatinine of 1.6 (No baseline to compare). Chest x ray done and clear per my read (official report still pending). The patient was given Aspirin 325 mg and was admitted to the stroke unit for further workup (2020 21:06)    PAST MEDICAL & SURGICAL HISTORY:  Coronary artery disease  Parkinson disease  S/P CABG (coronary artery bypass graft)    patient seen and examined independently on morning rounds for the first time today, chart reviewed and discussed with the medicine resident.    no overnight events---undergoing stroke workup including MRI/MRA-     Vital Signs Last 24 Hrs  T(C): 36.4 (2020 07:28), Max: 36.4 (2020 07:28)  T(F): 97.5 (2020 07:28), Max: 97.5 (2020 07:28)  HR: 60 (2020 07:28) (60 - 110)  BP: 127/69 (2020 07:28) (116/56 - 172/65)  BP(mean): --  RR: 20 (2020 07:28) (18 - 24)  SpO2: 97% (2020 07:28) (97% - 100%)             PE:  GEN-NAD, elderly/cachectic, +shaking hand tremors, awake but aphasic  PULM- Clear to auscultation bilaterally, fair air entry  CVS- +s1/s2 RRR no murmurs  GI- soft NT ND +bs, no rebound, no guarding  EXT- no edema               10.3   7.30  )-----------( 89       ( 2020 05:26 )             32.6     06-07    139  |  104  |  32<H>  ----------------------------<  94  4.2   |  22  |  1.2    Ca    9.0      2020 05:26  Mg     1.8     06-07    TPro  6.2  /  Alb  3.5  /  TBili  0.4  /  DBili  x   /  AST  15  /  ALT  <5  /  AlkPhos  81  06-07    CARDIAC MARKERS ( 2020 05:26 )  x     / 0.02 ng/mL / x     / x     / x      CARDIAC MARKERS ( 2020 00:36 )  x     / 0.02 ng/mL / 290 U/L / x     / 10.7 ng/mL  CARDIAC MARKERS ( 2020 19:00 )  x     / 0.02 ng/mL / 39 U/L / x     / 1.9 ng/mL      Urinalysis Basic - ( 2020 20:24 )    Color: Yellow / Appearance: Clear / S.028 / pH: x  Gluc: x / Ketone: Negative  / Bili: Negative / Urobili: <2 mg/dL   Blood: x / Protein: 30 mg/dL / Nitrite: Negative   Leuk Esterase: Negative / RBC: 7 /HPF / WBC 1 /HPF   Sq Epi: x / Non Sq Epi: 1 /HPF / Bacteria: Negative          MEDICATIONS  (STANDING):  aspirin  chewable 81 milliGRAM(s) Oral daily  atorvastatin 80 milliGRAM(s) Oral at bedtime  carbidopa/levodopa  25/100 1 Tablet(s) Oral three times a day  dicyclomine 10 milliGRAM(s) Oral three times a day before meals  ferrous    sulfate 325 milliGRAM(s) Oral daily  finasteride 5 milliGRAM(s) Oral daily  folic acid 1 milliGRAM(s) Oral daily  heparin   Injectable 5000 Unit(s) SubCutaneous every 8 hours  memantine 10 milliGRAM(s) Oral daily  metoprolol succinate ER 25 milliGRAM(s) Oral daily  pancrelipase  (CREON 36,000 Lipase Units) 1 Capsule(s) Oral daily  pantoprazole    Tablet 40 milliGRAM(s) Oral before breakfast  sodium chloride 0.9%. 1000 milliLiter(s) (75 mL/Hr) IV Continuous <Continuous>  tamsulosin 0.4 milliGRAM(s) Oral at bedtime

## 2020-06-07 NOTE — SWALLOW BEDSIDE ASSESSMENT ADULT - SLP PERTINENT HISTORY OF CURRENT PROBLEM
Adm w/ altered mental status, unable to speak, L facial droop; CT negative for acute pathology, MRI: acute L temporal infarcts; hx: CAD s/p CABG, Parkinson's; granddaughter reports pt currently more dysarthric than basline

## 2020-06-07 NOTE — CONSULT NOTE ADULT - ATTENDING COMMENTS
Seen / examined yesterday afternoon on rounds.  Above reviewed.    CAD s/p CABG  Comorbidities as above.    Presented with AMS and weakness.  Acute ischemic CVA on MRI (reviewed with neurosurgery NP).    Newly diagnosed AF.  Hemodynamics stable.  Rate controlled.  ROROV3ORW = (5).    EKG: AF, LBBB    PLAN:  - Cont home Toprol.  - Anticoagulation when bleeding risk acceptable per neurology.  - ECHO  - PT / OT

## 2020-06-07 NOTE — OCCUPATIONAL THERAPY INITIAL EVALUATION ADULT - GENERAL OBSERVATIONS, REHAB EVAL
OT evaluation 10:10-11:00 Pt received semi mata in bed in NAD with RN present +tele +IV drip. Pt speaks [place], RN reports pt communicates better with live person. PT Gretchen present during a portion of OT eval to assist communication and transfers.

## 2020-06-07 NOTE — PHYSICAL THERAPY INITIAL EVALUATION ADULT - RANGE OF MOTION EXAMINATION, REHAB EVAL
B UE's grossly WFL. B LE's grossly WFL. B UE's grossly WFL, + resting tremor noted L UE. B LE's grossly WFL.

## 2020-06-07 NOTE — OCCUPATIONAL THERAPY INITIAL EVALUATION ADULT - ADDITIONAL COMMENTS
Pt resides in private apt with elevator access. Pt reports his daughter his is HHA and assists him with daily tasks. +bathtub Pt unable to provide clear response when asked if he goes into bathtub shower or sponge bathes.

## 2020-06-07 NOTE — CONSULT NOTE ADULT - SUBJECTIVE AND OBJECTIVE BOX
Chief complaint:  AMS    HPI:  89 year old male patient with past medical history of CAD s/p CABG, Parkinson's disease, Iron deficiency anemia, folic acid deficiency, benign prostatic hyperplasia, brought by EMS from home for altered mental status.     The history was provided by the grand-daughter Flor as the patient is unable to speak. She states that the patient has been complaining of generalized weakness for the past 2 days, he woke up this morning able to speak and walk. He has been having constipation so he took a laxative. The family noticed that he has been in the bathroom for a whole, when his daughter checked on him he was sitting on the toilet bowl, unable to talk, staring, unresponsive, the daughter also noticed left facial droop. He was also having severe diarrhea.     In the ED : /68,  / min, RR 18, SpO2 97 % on room air. Stroke code was called and the patient was found to have an NIHSS of 9 (unable to perform tasks, does not answer questions, global aphasia and severe dysarthria). CT Head no contrast was done and showed no evidence of intra-cranial hemorrhage. patient was not a tPA candidate because he was outside the time window and was not a candidate for IA intervention as his m-RS was 4. CBC showing mild leucocytosis and thrombocytopenia, BMP showing a creatinine of 1.6 (No baseline to compare). Chest x ray done and clear per my read (official report still pending). The patient was given Aspirin 325 mg and was admitted to the stroke unit for further workup (06 Jun 2020 21:06)    Cardiology fellow addendum: Pt seen and examined in ER, lying in bed, appears comfortable but confused, HR in 120s, irregular just finished working with PT, baseline at home able to walk with walker, alert and oriented. Cardiology was consulted for afib in setting of possible CVA. H/o obtained from chart since he's not co-operative with translation phone use.       ROS:  Not able to provide due to confusion     PAST MEDICAL & SURGICAL HISTORY  Coronary artery disease  Parkinson disease  S/P CABG (coronary artery bypass graft)    FAMILY HISTORY:  FAMILY HISTORY:  NC    SOCIAL HISTORY:  No h/o smoking, alcohol    ALLERGIES:  No Known Allergies    MEDICATIONS:  MEDICATIONS  (STANDING):  aspirin  chewable 81 milliGRAM(s) Oral daily  atorvastatin 80 milliGRAM(s) Oral at bedtime  carbidopa/levodopa  25/100 1 Tablet(s) Oral three times a day  dicyclomine 10 milliGRAM(s) Oral three times a day before meals  ferrous    sulfate 325 milliGRAM(s) Oral daily  finasteride 5 milliGRAM(s) Oral daily  folic acid 1 milliGRAM(s) Oral daily  heparin   Injectable 5000 Unit(s) SubCutaneous every 8 hours  memantine 10 milliGRAM(s) Oral daily  pancrelipase  (CREON 36,000 Lipase Units) 1 Capsule(s) Oral daily  pantoprazole    Tablet 40 milliGRAM(s) Oral before breakfast  sodium chloride 0.9%. 1000 milliLiter(s) (75 mL/Hr) IV Continuous <Continuous>  tamsulosin 0.4 milliGRAM(s) Oral at bedtime    MEDICATIONS  (PRN):      HOME MEDICATIONS:  Home Medications:  Amitiza 24 mcg oral capsule: 1 cap(s) orally once a day (06 Jun 2020 22:09)  aspirin 81 mg oral tablet: 1 tab(s) orally once a day (06 Jun 2020 22:09)  carbidopa-levodopa 25 mg-100 mg oral tablet: 1 tab(s) orally 3 times a day (06 Jun 2020 22:09)  Creon 36,000 units oral delayed release capsule: 1 cap(s) orally once a day with breakfast (06 Jun 2020 22:09)  Dexilant 60 mg oral delayed release capsule: 1 cap(s) orally once a day (06 Jun 2020 22:09)  dicyclomine 10 mg oral capsule: 1 cap(s) orally once a day (06 Jun 2020 22:09)  ferrous sulfate 325 mg (65 mg elemental iron) oral tablet: 1 tab(s) orally 3 times a day (06 Jun 2020 22:09)  finasteride 5 mg oral tablet: 1 tab(s) orally once a day (06 Jun 2020 22:09)  folic acid 1 mg oral tablet: 1 tab(s) orally once a day (06 Jun 2020 22:09)  furosemide 20 mg oral tablet: 1 tab(s) orally once a day, As Needed (06 Jun 2020 22:09)  gabapentin 300 mg oral capsule: 1 cap(s) orally once a day (06 Jun 2020 22:09)  Janumet 50 mg-500 mg oral tablet: 0.5 tab(s) orally once a day, As Needed (06 Jun 2020 22:09)  memantine 14 mg oral capsule, extended release: 1 cap(s) orally once a day (06 Jun 2020 22:09)  Metoprolol Succinate ER 25 mg oral tablet, extended release: 1 tab(s) orally once a day (06 Jun 2020 22:09)  raNITIdine 300 mg oral tablet: 1 tab(s) orally once a day (at bedtime) (06 Jun 2020 22:09)  rosuvastatin 20 mg oral tablet: 1 tab(s) orally once a day (06 Jun 2020 22:09)  tamsulosin 0.4 mg oral capsule: 1 cap(s) orally once a day (06 Jun 2020 22:09)      VITALS:   T(F): 97.5 (06-07 @ 07:28), Max: 97.5 (06-07 @ 07:28)  HR: 60 (06-07 @ 07:28) (60 - 110)  BP: 127/69 (06-07 @ 07:28) (116/56 - 172/65)  BP(mean): --  RR: 20 (06-07 @ 07:28) (18 - 24)  SpO2: 97% (06-07 @ 07:28) (97% - 100%)    I&O's Summary    06 Jun 2020 07:01  -  07 Jun 2020 07:00  --------------------------------------------------------  IN: 0 mL / OUT: 250 mL / NET: -250 mL        PHYSICAL EXAM:  GEN: No acute distress, continuos shaking of hands  NECK: NO JVD, No carotid bruit,   LUNGS: Clear to auscultation bilaterally, non labored respiration  CARDIOVASCULAR: S1/S2 present, RRR , no murmus or rubs  ABD: Soft, non-tender, non-distended,   EXT: No Lower extremity edema, no tenderness  NEURO: Follows verbal commands, but confused, global aphasia, grossly intact strength in b/l extremities   SKIN: Intact    LABS:                        10.3   7.30  )-----------( 89       ( 07 Jun 2020 05:26 )             32.6     06-07    139  |  104  |  32<H>  ----------------------------<  94  4.2   |  22  |  1.2    Ca    9.0      07 Jun 2020 05:26  Mg     1.8     06-07    TPro  6.2  /  Alb  3.5  /  TBili  0.4  /  DBili  x   /  AST  15  /  ALT  <5  /  AlkPhos  81  06-07    PT/INR - ( 07 Jun 2020 05:26 )   PT: 12.90 sec;   INR: 1.12 ratio         PTT - ( 07 Jun 2020 05:26 )  PTT:30.5 sec  Troponin T, Serum: 0.02 ng/mL <H> (06-07-20 @ 05:26)  Creatine Kinase, Serum: 290 U/L <H> (06-07-20 @ 00:36)  Troponin T, Serum: 0.02 ng/mL <H> (06-07-20 @ 00:36)  Creatine Kinase, Serum: 39 U/L (06-06-20 @ 19:00)  Troponin T, Serum: 0.02 ng/mL <H> (06-06-20 @ 19:00)    CARDIAC MARKERS ( 07 Jun 2020 05:26 )  x     / 0.02 ng/mL / x     / x     / x      CARDIAC MARKERS ( 07 Jun 2020 00:36 )  x     / 0.02 ng/mL / 290 U/L / x     / 10.7 ng/mL  CARDIAC MARKERS ( 06 Jun 2020 19:00 )  x     / 0.02 ng/mL / 39 U/L / x     / 1.9 ng/mL    06-07 Chol 165  HDL 52 Trig 50    RADIOLOGY:  -CXR:    < from: Xray Chest 1 View AP/PA (06.06.20 @ 20:02) >  Impression:      No radiographic evidence of acute cardiopulmonary disease.    < end of copied text >      ECG:  < from: 12 Lead ECG (06.07.20 @ 01:32) >  Diagnosis Line    Atrial fibrillation  Non-specific intra-ventricular conduction block  T wave abnormality, consider inferior ischemia  Abnormal ECG    < end of copied text >    TELEMETRY EVENTS:  afib

## 2020-06-07 NOTE — PROGRESS NOTE ADULT - SUBJECTIVE AND OBJECTIVE BOX
DAILY PROGRESS NOTE  ===========================================================    Patient Information:  ALEXY PURDY  /  89y  /  Male  /  MRN#: [id number]    Hospital Day: 1d     |:::::::::::::::::::::::::::| SUBJECTIVE |:::::::::::::::::::::::::::|    OVERNIGHT EVENTS: None.  TODAY: Patient was seen today at bedside.    |:::::::::::::::::::::::::::| OBJECTIVE |:::::::::::::::::::::::::::|    VITAL SIGNS: Last 24 Hours  T(C): 36.4 (2020 07:28), Max: 36.4 (2020 07:28)  T(F): 97.5 (2020 07:28), Max: 97.5 (2020 07:28)  HR: 60 (2020 07:28) (60 - 110)  BP: 127/69 (2020 07:28) (116/56 - 172/65)  BP(mean): --  RR: 20 (2020 07:28) (18 - 24)  SpO2: 97% (2020 07:28) (97% - 100%)    20 @ 07:01  -  20 @ 07:00  --------------------------------------------------------  IN: 0 mL / OUT: 250 mL / NET: -250 mL    PHYSICAL EXAM:  GENERAL:   Awake, alert; NAD.  HEENT:  Head NC/AT; Conjunctivae pink, Sclera anicteric; Oral mucosa moist.  CARDIO:   Regular rate; Regular rhythm; S1 & S2.  RESP:   No rales, wheezing, or rhonchi appreciated.  GI:   Soft; NT/ND; BS; No guarding; No rebound tenderness.  EXT:   Stregnth UE 5/5; Strength LE 5/5; No edema.   SKIN:   Intact.    LAB RESULTS:                        10.3   7.30  )-----------( 89       ( 2020 05:26 )             32.6     139  |  104  |  32<H>  -------------------------<  94  4.2   |  22  |  1.2    Ca    9.0     Mg     1.8       TPro  6.2  /  Alb  3.5  /  TBili  0.4  /  DBili  x   /  AST  15  /  ALT  <5  /  AlkPhos  81      PT/INR - ( 2020 05:26 )   PT: 12.90 sec;   INR: 1.12 ratio    PTT - ( 2020 05:26 )  PTT:30.5 sec    Urinalysis Basic - ( 2020 20:24 )  Color: Yellow / Appearance: Clear / S.028 / pH: x  Gluc: x / Ketone: Negative  / Bili: Negative / Urobili: <2 mg/dL   Blood: x / Protein: 30 mg/dL / Nitrite: Negative   Leuk Esterase: Negative / RBC: 7 /HPF / WBC 1 /HPF   Sq Epi: x / Non Sq Epi: 1 /HPF / Bacteria: Negative    Troponin T, Serum: 0.02 ng/mL <H> (20 @ 05:26)  Creatine Kinase, Serum: 290 U/L <H> (20 @ 00:36)  Troponin T, Serum: 0.02 ng/mL <H> (20 @ 00:36)  Creatine Kinase, Serum: 39 U/L (20 @ 19:00)  Troponin T, Serum: 0.02 ng/mL <H> (20 @ 19:00)    CARDIAC MARKERS ( 2020 05:26 )  x     / 0.02 ng/mL / x     / x     / x      CARDIAC MARKERS ( 2020 00:36 )  x     / 0.02 ng/mL / 290 U/L / x     / 10.7 ng/mL  CARDIAC MARKERS ( 2020 19:00 )  x     / 0.02 ng/mL / 39 U/L / x     / 1.9 ng/mL    MICROBIOLOGY:  None    RADIOLOGY:  Reviewed    ALLERGIES:  No Known Allergies      =========================================================== DAILY PROGRESS NOTE  ===========================================================    Patient Information:  ALEXY PURDY  /  89y  /  Male  /  MRN#: [id number]    Hospital Day: 1d     |:::::::::::::::::::::::::::| SUBJECTIVE |:::::::::::::::::::::::::::|    OVERNIGHT EVENTS: Atrial flutter.  TODAY: Patient was seen today at bedside.    |:::::::::::::::::::::::::::| OBJECTIVE |:::::::::::::::::::::::::::|    VITAL SIGNS: Last 24 Hours  T(C): 36.4 (2020 07:28), Max: 36.4 (2020 07:28)  T(F): 97.5 (2020 07:28), Max: 97.5 (2020 07:28)  HR: 60 (2020 07:28) (60 - 110)  BP: 127/69 (2020 07:28) (116/56 - 172/65)  BP(mean): --  RR: 20 (2020 07:28) (18 - 24)  SpO2: 97% (2020 07:28) (97% - 100%)    20 @ 07:01  -  20 @ 07:00  --------------------------------------------------------  IN: 0 mL / OUT: 250 mL / NET: -250 mL    PHYSICAL EXAM:  GENERAL:   Awake, alert; NAD.  HEENT:  Head NC/AT; Conjunctivae pink, Sclera anicteric; Oral mucosa moist.  CARDIO:   Regular rate; Regular rhythm; S1 & S2.  RESP:   No rales, wheezing, or rhonchi appreciated.  GI:   Soft; NT/ND; BS; No guarding; No rebound tenderness.  EXT:   Stregnth UE 5/5; Strength LE 5/5; No edema.   SKIN:   Intact.    LAB RESULTS:                        10.3   7.30  )-----------( 89       ( 2020 05:26 )             32.6     139  |  104  |  32<H>  -------------------------<  94  4.2   |  22  |  1.2    Ca    9.0     Mg     1.8       TPro  6.2  /  Alb  3.5  /  TBili  0.4  /  DBili  x   /  AST  15  /  ALT  <5  /  AlkPhos  81      PT/INR - ( 2020 05:26 )   PT: 12.90 sec;   INR: 1.12 ratio    PTT - ( 2020 05:26 )  PTT:30.5 sec    Urinalysis Basic - ( 2020 20:24 )  Color: Yellow / Appearance: Clear / S.028 / pH: x  Gluc: x / Ketone: Negative  / Bili: Negative / Urobili: <2 mg/dL   Blood: x / Protein: 30 mg/dL / Nitrite: Negative   Leuk Esterase: Negative / RBC: 7 /HPF / WBC 1 /HPF   Sq Epi: x / Non Sq Epi: 1 /HPF / Bacteria: Negative    Troponin T, Serum: 0.02 ng/mL <H> (20 @ 05:26)  Creatine Kinase, Serum: 290 U/L <H> (20 @ 00:36)  Troponin T, Serum: 0.02 ng/mL <H> (20 @ 00:36)  Creatine Kinase, Serum: 39 U/L (20 @ 19:00)  Troponin T, Serum: 0.02 ng/mL <H> (20 @ 19:00)    CARDIAC MARKERS ( 2020 05:26 )  x     / 0.02 ng/mL / x     / x     / x      CARDIAC MARKERS ( 2020 00:36 )  x     / 0.02 ng/mL / 290 U/L / x     / 10.7 ng/mL  CARDIAC MARKERS ( 2020 19:00 )  x     / 0.02 ng/mL / 39 U/L / x     / 1.9 ng/mL    MICROBIOLOGY:  None    RADIOLOGY:  Reviewed    ALLERGIES:  No Known Allergies      =========================================================== DAILY PROGRESS NOTE  ===========================================================    Patient Information:  ALEXY PURDY  /  89y  /  Male  /  MRN#: [id number]    Hospital Day: 1d     |:::::::::::::::::::::::::::| SUBJECTIVE |:::::::::::::::::::::::::::|    OVERNIGHT EVENTS: Atrial flutter.  TODAY: Patient was seen today at bedside.    |:::::::::::::::::::::::::::| OBJECTIVE |:::::::::::::::::::::::::::|    VITAL SIGNS: Last 24 Hours  T(C): 36.4 (2020 07:28), Max: 36.4 (2020 07:28)  T(F): 97.5 (2020 07:28), Max: 97.5 (2020 07:28)  HR: 60 (2020 07:28) (60 - 110)  BP: 127/69 (2020 07:28) (116/56 - 172/65)  BP(mean): --  RR: 20 (2020 07:28) (18 - 24)  SpO2: 97% (2020 07:28) (97% - 100%)    20 @ 07:01  -  20 @ 07:00  --------------------------------------------------------  IN: 0 mL / OUT: 250 mL / NET: -250 mL    PHYSICAL EXAM:  GENERAL:   Awake, alert; NAD; Shaking at rest with lip quiver and hands.  HEENT:  Head NC/AT; Conjunctivae pink, Sclera anicteric; Oral mucosa moist.  CARDIO:   Increased rate; IRRegular rhythm; S1 & S2; murmur.  RESP:   No rales, wheezing, or rhonchi appreciated.  GI:   Soft; NT/ND; BS; No guarding; No rebound tenderness.  EXT:   Strength in tact; No edema.   SKIN:   Intact.    LAB RESULTS:                        10.3   7.30  )-----------( 89       ( 2020 05:26 )             32.6     139  |  104  |  32<H>  -------------------------<  94  4.2   |  22  |  1.2    Ca    9.0     Mg     1.8       TPro  6.2  /  Alb  3.5  /  TBili  0.4  /  DBili  x   /  AST  15  /  ALT  <5  /  AlkPhos  81      PT/INR - ( 2020 05:26 )   PT: 12.90 sec;   INR: 1.12 ratio    PTT - ( 2020 05:26 )  PTT:30.5 sec    Urinalysis Basic - ( 2020 20:24 )  Color: Yellow / Appearance: Clear / S.028 / pH: x  Gluc: x / Ketone: Negative  / Bili: Negative / Urobili: <2 mg/dL   Blood: x / Protein: 30 mg/dL / Nitrite: Negative   Leuk Esterase: Negative / RBC: 7 /HPF / WBC 1 /HPF   Sq Epi: x / Non Sq Epi: 1 /HPF / Bacteria: Negative    Troponin T, Serum: 0.02 ng/mL <H> (20 @ 05:26)  Creatine Kinase, Serum: 290 U/L <H> (20 @ 00:36)  Troponin T, Serum: 0.02 ng/mL <H> (20 @ 00:36)  Creatine Kinase, Serum: 39 U/L (20 @ 19:00)  Troponin T, Serum: 0.02 ng/mL <H> (20 @ 19:00)    CARDIAC MARKERS ( 2020 05:26 )  x     / 0.02 ng/mL / x     / x     / x      CARDIAC MARKERS ( 2020 00:36 )  x     / 0.02 ng/mL / 290 U/L / x     / 10.7 ng/mL  CARDIAC MARKERS ( 2020 19:00 )  x     / 0.02 ng/mL / 39 U/L / x     / 1.9 ng/mL    MICROBIOLOGY:  None    RADIOLOGY:  Reviewed    ALLERGIES:  No Known Allergies      ===========================================================

## 2020-06-07 NOTE — CONSULT NOTE ADULT - ASSESSMENT
IMPRESSION: Rehab of gait ab parkinsons CVA Aphasia New onset A Fib    PRECAUTIONS: [ x   ] Cardiac  [    ] Respiratory  [    ] Seizures [    ] Contact Isolation  [    ] Droplet Isolation  [    ] Other    Weight Bearing Status:     RECOMMENDATION:    Out of Bed to Chair     DVT/Decubiti Prophylaxis    REHAB PLAN:     [    x ] Bedside P/T 3-5 times a week   [ x    ] Bedside O/T  2-3 times a week   [     ] No Rehab Therapy Indicated   [     ]  Speech Therapy   Conditioning/ROM                                 ADL  Bed Mobility                                            Conditioning/ROM  Transfers                                                  Bed Mobility  Sitting /Standing Balance                      Transfers                                        Gait Training                                            Sitting/Standing Balance  Stair Training [   ]Applicable                 Home equipment Eval                                                                     Splinting  [   ] Only      GOALS:   ADL   [  x  ]   Independent         Transfers  [x    ] Independent            Ambulation  [   x  ] Independent     [    x ] With device                            [    ]  CG                                               [    ]  CG                                                    [     ] CG                            [    ] Min A                                          [    ] Min A                                                [     ] Min  A          DISCHARGE PLAN:   [x     ]  Good candidate for Intensive Rehabilitation/Hospital based                                             Will tolerate 3hrs Intensive Rehab Daily NO REHAB AT [facility]                               VS                                     [  x    ]  Short Term Rehab in Skilled Nursing Facility                                       [      ]  Home with Outpatient or VN services                                         [      ]  Possible Candidate for Intensive Hospital based Rehab

## 2020-06-07 NOTE — PROGRESS NOTE ADULT - SUBJECTIVE AND OBJECTIVE BOX
Stroke Progress Note:  1. Chief Complaint: Aphasia    HPI:  89 year old gentleman with a past medical history of parkinsons disease presents to the ED with inability to speak since waking on day of presentation. Stroke code called on arrival. NIHSS 9 for aphasia and inability to answer questions.   Out of the window for IV thrombolytics.     2. Relevant PMH:   Prior ischemic stroke/TIA[ ], Afib [ ], CAD [ ], HTN [ ], DLD [ ], DM [ ], PVD [ ], Obesity [ ],   Sedentary lifestyle [ ], CHF [ ], TEX [ ], Cancer Hx [ ].    3. Social History: Smoking [ ], Drug Use [ ], Alcohol Use [ ], Other [ ]    4. Possible Location of Stroke:  Unknown at this time will have a better understanding post stroke workup.   5. Relevant Brain Tissue Imaging:  < from: CT Brain Stroke Protocol (20 @ 19:24) >    IMPRESSION:     No evidence for acute intracranial hemorrhage, midline shift, or mass effect.    < end of copied text >    6. Relevant Cerebrovascular Imagin. Relevant blood tests:    Direct LDL: 106        8. Relevant cardiac rhythm monitoring:  no reported events  9. Relevant Cardiac Structure: (TTE/MARIAH +/-):[ ]No intracardiac thrombus/[ ] no vegetation/[ ]no akynesia/EF:  pending    Home Medications:  Amitiza 24 mcg oral capsule: 1 cap(s) orally once a day (2020 22:09)  aspirin 81 mg oral tablet: 1 tab(s) orally once a day (2020 22:09)  carbidopa-levodopa 25 mg-100 mg oral tablet: 1 tab(s) orally 3 times a day (2020 22:09)  Creon 36,000 units oral delayed release capsule: 1 cap(s) orally once a day with breakfast (2020 22:09)  Dexilant 60 mg oral delayed release capsule: 1 cap(s) orally once a day (2020 22:09)  dicyclomine 10 mg oral capsule: 1 cap(s) orally once a day (2020 22:09)  ferrous sulfate 325 mg (65 mg elemental iron) oral tablet: 1 tab(s) orally 3 times a day (2020 22:09)  finasteride 5 mg oral tablet: 1 tab(s) orally once a day (2020 22:09)  folic acid 1 mg oral tablet: 1 tab(s) orally once a day (2020 22:09)  furosemide 20 mg oral tablet: 1 tab(s) orally once a day, As Needed (2020 22:09)  gabapentin 300 mg oral capsule: 1 cap(s) orally once a day (2020 22:09)  Janumet 50 mg-500 mg oral tablet: 0.5 tab(s) orally once a day, As Needed (2020 22:09)  memantine 14 mg oral capsule, extended release: 1 cap(s) orally once a day (2020 22:09)  Metoprolol Succinate ER 25 mg oral tablet, extended release: 1 tab(s) orally once a day (2020 22:09)  raNITIdine 300 mg oral tablet: 1 tab(s) orally once a day (at bedtime) (2020 22:09)  rosuvastatin 20 mg oral tablet: 1 tab(s) orally once a day (2020 22:09)  tamsulosin 0.4 mg oral capsule: 1 cap(s) orally once a day (2020 22:09)      MEDICATIONS  (STANDING):  aspirin  chewable 81 milliGRAM(s) Oral daily  atorvastatin 80 milliGRAM(s) Oral at bedtime  carbidopa/levodopa  25/100 1 Tablet(s) Oral three times a day  dicyclomine 10 milliGRAM(s) Oral three times a day before meals  ferrous    sulfate 325 milliGRAM(s) Oral daily  finasteride 5 milliGRAM(s) Oral daily  folic acid 1 milliGRAM(s) Oral daily  heparin   Injectable 5000 Unit(s) SubCutaneous every 8 hours  memantine 10 milliGRAM(s) Oral daily  pancrelipase  (CREON 36,000 Lipase Units) 1 Capsule(s) Oral daily  pantoprazole    Tablet 40 milliGRAM(s) Oral before breakfast  sodium chloride 0.9%. 1000 milliLiter(s) (75 mL/Hr) IV Continuous <Continuous>  tamsulosin 0.4 milliGRAM(s) Oral at bedtime      10. PT/OT/Speech/Rehab/S&Sw/ Cognitive eval results and recommendations: pending    11. Exam:    Vital Signs Last 24 Hrs  T(C): 36.4 (2020 07:28), Max: 36.4 (2020 07:28)  T(F): 97.5 (2020 07:28), Max: 97.5 (2020 07:28)  HR: 60 (2020 07:28) (60 - 110)  BP: 127/69 (2020 07:28) (116/56 - 172/65)  BP(mean): --  RR: 20 (2020 07:28) (18 - 24)  SpO2: 97% (2020 07:28) (97% - 100%)    12.   NIH STROKE SCALE  Item	                                                        Score  1 a.	Level of Consciousness	               	0  1 b. LOC Questions	                               2  1 c.	LOC Commands	                               	1  2.	Best Gaze	                                        0  3.	Visual	                                                0  4.	Facial Palsy	                                        0  5 a.	Motor Arm - Left	                                0  5 b.	Motor Arm - Right	                        0  6 a.	Motor Leg - Left	                                0  6 b.	Motor Leg - Right	                                0  7.	Limb Ataxia	                                        0  8.	Sensory	                                                0  9.	Language	                                        3  10.	Dysarthria	                                       2  11.	Extinction and Inattention  	        0  ______________________________________  TOTAL	                                                     8         NIHSS today: 8    mRS:4  0 No symptoms at all  1 No significant disability despite symptoms; able to carry out all usual duties and activities without assistance  2 Slight disability; unable to carry out all previous activities, but able to look after own affairs  3 Moderate disability; requiring some help, but able to walk without assistance  4 Moderately severe disability; unable to walk without assistance and unable to attend to own bodily needs without assistance  5 Severe disability; bedridden, incontinent and requiring constant nursing care and attention  6 Dead

## 2020-06-07 NOTE — SWALLOW BEDSIDE ASSESSMENT ADULT - COMMENTS
granddaughter reports occasional choking on solids at home and therefore consumes ground consistency foods mild oral impairment for soft consistency mod oral dysphagia for regular consistency

## 2020-06-07 NOTE — PHYSICAL THERAPY INITIAL EVALUATION ADULT - CRITERIA FOR SKILLED THERAPEUTIC INTERVENTIONS
risk reduction/prevention/impairments found/functional limitations in following categories/rehab potential/therapy frequency

## 2020-06-08 LAB
A1C WITH ESTIMATED AVERAGE GLUCOSE RESULT: 5.8 % — HIGH (ref 4–5.6)
BASOPHILS # BLD AUTO: 0.03 K/UL — SIGNIFICANT CHANGE UP (ref 0–0.2)
BASOPHILS NFR BLD AUTO: 0.3 % — SIGNIFICANT CHANGE UP (ref 0–1)
EOSINOPHIL # BLD AUTO: 0.04 K/UL — SIGNIFICANT CHANGE UP (ref 0–0.7)
EOSINOPHIL NFR BLD AUTO: 0.4 % — SIGNIFICANT CHANGE UP (ref 0–8)
ESTIMATED AVERAGE GLUCOSE: 120 MG/DL — HIGH (ref 68–114)
FERRITIN SERPL-MCNC: 66 NG/ML — SIGNIFICANT CHANGE UP (ref 30–400)
FOLATE SERPL-MCNC: 16.3 NG/ML — SIGNIFICANT CHANGE UP
HCT VFR BLD CALC: 33.9 % — LOW (ref 42–52)
HGB BLD-MCNC: 11.2 G/DL — LOW (ref 14–18)
IMM GRANULOCYTES NFR BLD AUTO: 0.3 % — SIGNIFICANT CHANGE UP (ref 0.1–0.3)
LYMPHOCYTES # BLD AUTO: 1.7 K/UL — SIGNIFICANT CHANGE UP (ref 1.2–3.4)
LYMPHOCYTES # BLD AUTO: 18.9 % — LOW (ref 20.5–51.1)
MCHC RBC-ENTMCNC: 30.9 PG — SIGNIFICANT CHANGE UP (ref 27–31)
MCHC RBC-ENTMCNC: 33 G/DL — SIGNIFICANT CHANGE UP (ref 32–37)
MCV RBC AUTO: 93.6 FL — SIGNIFICANT CHANGE UP (ref 80–94)
MONOCYTES # BLD AUTO: 0.6 K/UL — SIGNIFICANT CHANGE UP (ref 0.1–0.6)
MONOCYTES NFR BLD AUTO: 6.7 % — SIGNIFICANT CHANGE UP (ref 1.7–9.3)
NEUTROPHILS # BLD AUTO: 6.58 K/UL — HIGH (ref 1.4–6.5)
NEUTROPHILS NFR BLD AUTO: 73.4 % — SIGNIFICANT CHANGE UP (ref 42.2–75.2)
NRBC # BLD: 0 /100 WBCS — SIGNIFICANT CHANGE UP (ref 0–0)
PLATELET # BLD AUTO: 88 K/UL — LOW (ref 130–400)
RBC # BLD: 3.62 M/UL — LOW (ref 4.7–6.1)
RBC # FLD: 14 % — SIGNIFICANT CHANGE UP (ref 11.5–14.5)
TSH SERPL-MCNC: 1.14 UIU/ML — SIGNIFICANT CHANGE UP (ref 0.27–4.2)
TSH SERPL-MCNC: 1.17 UIU/ML — SIGNIFICANT CHANGE UP (ref 0.27–4.2)
VIT B12 SERPL-MCNC: 314 PG/ML — SIGNIFICANT CHANGE UP (ref 232–1245)
WBC # BLD: 8.98 K/UL — SIGNIFICANT CHANGE UP (ref 4.8–10.8)
WBC # FLD AUTO: 8.98 K/UL — SIGNIFICANT CHANGE UP (ref 4.8–10.8)

## 2020-06-08 PROCEDURE — 99233 SBSQ HOSP IP/OBS HIGH 50: CPT

## 2020-06-08 PROCEDURE — 93306 TTE W/DOPPLER COMPLETE: CPT | Mod: 26

## 2020-06-08 RX ORDER — CLOPIDOGREL BISULFATE 75 MG/1
300 TABLET, FILM COATED ORAL ONCE
Refills: 0 | Status: COMPLETED | OUTPATIENT
Start: 2020-06-08 | End: 2020-06-08

## 2020-06-08 RX ORDER — CLOPIDOGREL BISULFATE 75 MG/1
75 TABLET, FILM COATED ORAL DAILY
Refills: 0 | Status: DISCONTINUED | OUTPATIENT
Start: 2020-06-09 | End: 2020-06-10

## 2020-06-08 RX ORDER — LIPASE/PROTEASE/AMYLASE 16-48-48K
3 CAPSULE,DELAYED RELEASE (ENTERIC COATED) ORAL
Refills: 0 | Status: DISCONTINUED | OUTPATIENT
Start: 2020-06-08 | End: 2020-06-12

## 2020-06-08 RX ADMIN — Medication 10 MILLIGRAM(S): at 13:02

## 2020-06-08 RX ADMIN — CLOPIDOGREL BISULFATE 300 MILLIGRAM(S): 75 TABLET, FILM COATED ORAL at 18:42

## 2020-06-08 RX ADMIN — FINASTERIDE 5 MILLIGRAM(S): 5 TABLET, FILM COATED ORAL at 13:04

## 2020-06-08 RX ADMIN — HEPARIN SODIUM 5000 UNIT(S): 5000 INJECTION INTRAVENOUS; SUBCUTANEOUS at 21:01

## 2020-06-08 RX ADMIN — CARBIDOPA AND LEVODOPA 1 TABLET(S): 25; 100 TABLET ORAL at 06:18

## 2020-06-08 RX ADMIN — Medication 325 MILLIGRAM(S): at 13:04

## 2020-06-08 RX ADMIN — ATORVASTATIN CALCIUM 80 MILLIGRAM(S): 80 TABLET, FILM COATED ORAL at 21:12

## 2020-06-08 RX ADMIN — HEPARIN SODIUM 5000 UNIT(S): 5000 INJECTION INTRAVENOUS; SUBCUTANEOUS at 06:18

## 2020-06-08 RX ADMIN — SODIUM CHLORIDE 75 MILLILITER(S): 9 INJECTION INTRAMUSCULAR; INTRAVENOUS; SUBCUTANEOUS at 06:18

## 2020-06-08 RX ADMIN — Medication 1 MILLIGRAM(S): at 13:04

## 2020-06-08 RX ADMIN — PANTOPRAZOLE SODIUM 40 MILLIGRAM(S): 20 TABLET, DELAYED RELEASE ORAL at 06:18

## 2020-06-08 RX ADMIN — CARBIDOPA AND LEVODOPA 1 TABLET(S): 25; 100 TABLET ORAL at 13:45

## 2020-06-08 RX ADMIN — Medication 10 MILLIGRAM(S): at 17:54

## 2020-06-08 RX ADMIN — MEMANTINE HYDROCHLORIDE 10 MILLIGRAM(S): 10 TABLET ORAL at 13:05

## 2020-06-08 RX ADMIN — Medication 25 MILLIGRAM(S): at 06:18

## 2020-06-08 RX ADMIN — TAMSULOSIN HYDROCHLORIDE 0.4 MILLIGRAM(S): 0.4 CAPSULE ORAL at 21:12

## 2020-06-08 RX ADMIN — CARBIDOPA AND LEVODOPA 1 TABLET(S): 25; 100 TABLET ORAL at 21:12

## 2020-06-08 RX ADMIN — Medication 10 MILLIGRAM(S): at 08:36

## 2020-06-08 NOTE — PROGRESS NOTE ADULT - ASSESSMENT
Altered mental status a/w global aphasia prior to presentation  Secondary to Acute Ischemic Stroke with resultant Expressive Aphasia  - CT Head negative on admission, NIHSS 9; Not a tPA (outside window)' Not an IA candidate (m-RS > 2)  - MRI showed acute ischemic stroke (L-Temporal, L-Insular Cortex; L-Parietal focus)  - Could be resultant from Atrial Fibrillation (new?) and not on AC  - Continue ASA 81mg QD and Atorvastatin 80mg QHS  - Will discuss need for Plavix with Neurology as ASA was a home medication  - Follow up TTE w/ Bubble   - Follow up Lipid panel, TSH, and A1c  - Holding Gabapentin given change in mentation; No current issues since held  - Continue Neurochecks Q4H  - Aspiration, Seizure and Fall precautions  - Dysphagia 2 diet as per S+S  - PT / OT; Will need placement vs. home if family denies    Paroxysmal Atrial fibrillation; ?New  - Occurred overnight on 6/7/2020: Self resolved, and now back in Fib  - Full AC not initiated during workup for ischemic stroke; Harm may outweigh benefit given Parkinson's and risk of falls  - Will continue home Metoprolol 25mg QD; Currently rate controlled    Elevated Troponin T; Likely chronic  - Likely due to CKD; CKMB was elevated x1 with no events of EKG changes  - Stable and no need to trend further    Acute Kidney Injury; Hx of CKD (resolved)  - Creatinine 1.6 on admission; Now improved to 1.2   - Avoid hypotension and nephrotoxic medication     Anemia and Thrombocytopenia; Hx of Multifactorial anemia  - Continue home Ferrous sulfate and Folic acid  - Follow up Vitamin B12 and B9 levels; iron studies    Hx of Parkinson's disease: Continue Carbidopa/Levodopa 25/100 TID + Memantine QD  Hx of CAD s/p CABG: Continue ASA and Statin; Held Metoptolol for permissive HTN; Resume when indicated  Hx of Benign prostatic hyperplasia: Continue Flomax 0.4mg QHS + Finasteride 5mg QD  Hx of GERD: Continue Protonix; Takes Ranitidine and Dexilant at home    GI ppx:   Protonix  DVT ppx:   Heparin SubQ  Activity:   As Tolerated   DISPO:  Patient to be discharged when condition(s) optimized. Will discuss with family desire to send patient to recommend STR vs. home; Will also discuss need for anticogulation for atrial fibrillation (Risk vs. benefit given his age and chronic disease)    CODE STATUS:   FULL Altered mental status a/w global aphasia prior to presentation  Secondary to Acute Ischemic Stroke with resultant Expressive Aphasia vs. Dysarthria  - CT Head negative on admission, NIHSS 9; Not a tPA (outside window)' Not an IA candidate (m-RS > 2)  - MRI showed acute ischemic stroke (L-Temporal, L-Insular Cortex; L-Parietal focus)  - Could be resultant from Atrial Fibrillation (new?) and not on AC  - Continue ASA 81mg QD and Atorvastatin 80mg QHS  - Will discuss need for Plavix with Neurology as ASA was a home medication  - Follow up TTE w/ Bubble   - Follow up Lipid panel, TSH, and A1c  - Holding Gabapentin given change in mentation; No current issues since held  - Continue Neurochecks Q4H  - Aspiration, Seizure and Fall precautions  - Dysphagia 2 diet as per S+S  - PT / OT; Will need placement vs. home if family denies    Paroxysmal Atrial fibrillation; ?New  - Occurred overnight on 6/7/2020: Self resolved, and now back in Fib  - Full AC not initiated during workup for ischemic stroke; Harm may outweigh benefit given Parkinson's and risk of falls  - Will continue home Metoprolol 25mg QD; Currently rate controlled    Elevated Troponin T; Likely chronic  - Likely due to CKD; CKMB was elevated x1 with no events of EKG changes  - Stable and no need to trend further    Acute Kidney Injury; Hx of CKD (resolved)  - Creatinine 1.6 on admission; Now improved to 1.2   - Avoid hypotension and nephrotoxic medication     Anemia and Thrombocytopenia; Hx of Multifactorial anemia  - Continue home Ferrous sulfate and Folic acid  - Follow up Vitamin B12 and B9 levels; iron studies    Hx of Parkinson's disease: Continue Carbidopa/Levodopa 25/100 TID + Memantine QD  Hx of CAD s/p CABG: Continue ASA and Statin; Held Metoptolol for permissive HTN; Resume when indicated  Hx of Benign prostatic hyperplasia: Continue Flomax 0.4mg QHS + Finasteride 5mg QD  Hx of GERD: Continue Protonix; Takes Ranitidine and Dexilant at home    GI ppx:   Protonix  DVT ppx:   Heparin SubQ  Activity:   As Tolerated   DISPO:  Patient to be discharged when condition(s) optimized. Will discuss with family desire to send patient to recommend STR vs. home; Will also discuss need for anticogulation for atrial fibrillation (Risk vs. benefit given his age and chronic disease)    CODE STATUS:   FULL

## 2020-06-08 NOTE — PROGRESS NOTE ADULT - ASSESSMENT
13. Impression:  89 year old gentleman with a past medical history of parkinsons disease presents to the ED with inability to speak since waking on day of presentation. Stroke code called on arrival. Out of the window for IV thrombolytics. Patients modified frank scale is >2 therefore is not a candidate for IA intervention. Today following commands without focal weakness, patient with persistent expressive aphasia. MRI brain revealed acute infarcts in the left insular cortex and small focus in the left parietal lobe. Found to have new onset Afib/flutter.       14. Probable cause/s of Stroke:  Unknown at this time will have a better understanding post stroke workup.     15. Suggestions:   Routine stroke workup including:  CTA head and neck, MRA head and neck suggested atherosclerotic plaques.  Suggest Lipitor 80 mg qhs  Suggest dual antiplatelet therapy.   ASA 81 mg daily.  Load with Plavix 300 mg x 1 now and 75 mg daily starting tomorrow.  Avoid dehydration, hypotension, hypovolemia.   Telemetry monitoring.   Q4 neuro checks.     16. Disposition:  Stroke unit.     Raymundo Samuel NP  [phone]

## 2020-06-08 NOTE — SPEECH LANGUAGE PATHOLOGY EVALUATION - SLP DIAGNOSIS
moderate hypokinetic dysarthria, characterized by hypophonia, dysfluencies, imprecise precision. mild expressive aphasia, characterized by global paraphasias,

## 2020-06-08 NOTE — SPEECH LANGUAGE PATHOLOGY EVALUATION - COMMENTS
pt received alert, verbally responsive. on RA. +hypokinetic dysarthria. as per pt's report, +new onset of dysfluencies post this admission. pt presents with moderate dysarthria, characterized by hypophonia, dysfluencies, imprecise precision. DNT 2/2 tremor moderate dysarthria

## 2020-06-08 NOTE — PROGRESS NOTE ADULT - SUBJECTIVE AND OBJECTIVE BOX
Stroke Progress Note:    ALEXY PURDY    1. Chief Complaint: Aphasia    HPI:  89 year old gentleman with a past medical history of parkinsons disease presents to the ED with inability to speak since waking on day of presentation. Stroke code called on arrival. NIHSS 9 for aphasia and inability to answer questions.   Out of the window for IV thrombolytics.     2. Relevant PMH:   Prior ischemic stroke/TIA[ ], Afib [ ], CAD [ ], HTN [ ], DLD [ ], DM [ ], PVD [ ], Obesity [ ],   Sedentary lifestyle [ ], CHF [ ], TEX [ ], Cancer Hx [ ].    3. Social History: Smoking [ ], Drug Use [ ], Alcohol Use [ ], Other [ ]    4. Possible Location of Stroke:   Left temporal lobe, left insular cortex and small focus in the left parietal lobe consistent with an acute ischemic change.    5. Relevant Brain Tissue Imaging:  < from: MR Head No Cont (06.07.20 @ 09:17) >  IMPRESSION:    Patchy foci of hyperintense signal intensity on the T2, FLAIR and diffusion-weighted images in the left temporal lobe, left insular cortex and small focus in the left parietal lobe consistent with an acute ischemic change.      6. Relevant Cerebrovascular Imaging:    < from: MR Angio Neck w/wo IV Cont (06.07.20 @ 09:19) >  IMPRESSION:    Unremarkable MRA of the neck.    < from: MR Angio Head No Cont (06.07.20 @ 09:17) >  IMPRESSION:    Unremarkable MRA of the brain.      7. Relevant blood tests:    Direct LDL: 106    8. Relevant cardiac rhythm monitoring:  New onset Afib flutter  9. Relevant Cardiac Structure: (TTE/MARIAH +/-):[ ]No intracardiac thrombus/[ ] no vegetation/[ ]no akynesia/EF:  < from: Transthoracic Echocardiogram (06.08.20 @ 11:13) >      Summary:   1. LV Ejection Fraction by Snowden's Method with a biplane EF of 53 %.   2. Normal left ventricular size and wall thicknesses, with normal systolic and diastolic function.   3. The left ventricular diastolic function could not be assessed in this study.   4. Degenerative mitral valve.   5. Mild mitral valve regurgitation.   6. Moderate thickening and calcification of the anterior and posterior mitral valve leaflets.   7. Severe mitral annular calcification.   8. Sclerotic aortic valve with normal opening.   9. Estimated pulmonary artery systolic pressure is 45.2 mmHg assuming a right atrial pressure of 3 mmHg, which is consistent with mild pulmonary hypertension.  10. Pulmonary hypertension is present.  11. Color flow doppler and intravenous injection of agitated saline demonstrates the presence of an intact intra atrial septum.  12. LA volume Index is 29.2 ml/m² ml/m2.        Home Medications:  Amitiza 24 mcg oral capsule: 1 cap(s) orally once a day (06 Jun 2020 22:09)  aspirin 81 mg oral tablet: 1 tab(s) orally once a day (06 Jun 2020 22:09)  carbidopa-levodopa 25 mg-100 mg oral tablet: 1 tab(s) orally 3 times a day (06 Jun 2020 22:09)  Creon 36,000 units oral delayed release capsule: 1 cap(s) orally once a day with breakfast (06 Jun 2020 22:09)  Dexilant 60 mg oral delayed release capsule: 1 cap(s) orally once a day (06 Jun 2020 22:09)  dicyclomine 10 mg oral capsule: 1 cap(s) orally once a day (06 Jun 2020 22:09)  ferrous sulfate 325 mg (65 mg elemental iron) oral tablet: 1 tab(s) orally 3 times a day (06 Jun 2020 22:09)  finasteride 5 mg oral tablet: 1 tab(s) orally once a day (06 Jun 2020 22:09)  folic acid 1 mg oral tablet: 1 tab(s) orally once a day (06 Jun 2020 22:09)  furosemide 20 mg oral tablet: 1 tab(s) orally once a day, As Needed (06 Jun 2020 22:09)  gabapentin 300 mg oral capsule: 1 cap(s) orally once a day (06 Jun 2020 22:09)  Janumet 50 mg-500 mg oral tablet: 0.5 tab(s) orally once a day, As Needed (06 Jun 2020 22:09)  memantine 14 mg oral capsule, extended release: 1 cap(s) orally once a day (06 Jun 2020 22:09)  Metoprolol Succinate ER 25 mg oral tablet, extended release: 1 tab(s) orally once a day (06 Jun 2020 22:09)  raNITIdine 300 mg oral tablet: 1 tab(s) orally once a day (at bedtime) (06 Jun 2020 22:09)  rosuvastatin 20 mg oral tablet: 1 tab(s) orally once a day (06 Jun 2020 22:09)  tamsulosin 0.4 mg oral capsule: 1 cap(s) orally once a day (06 Jun 2020 22:09)      MEDICATIONS  (STANDING):  aspirin  chewable 81 milliGRAM(s) Oral daily  atorvastatin 80 milliGRAM(s) Oral at bedtime  carbidopa/levodopa  25/100 1 Tablet(s) Oral three times a day  dicyclomine 10 milliGRAM(s) Oral three times a day before meals  ferrous    sulfate 325 milliGRAM(s) Oral daily  finasteride 5 milliGRAM(s) Oral daily  folic acid 1 milliGRAM(s) Oral daily  heparin   Injectable 5000 Unit(s) SubCutaneous every 8 hours  memantine 10 milliGRAM(s) Oral daily  metoprolol succinate ER 25 milliGRAM(s) Oral daily  pancrelipase  (CREON 12,000 Lipase Units) 3 Capsule(s) Oral with breakfast  pantoprazole    Tablet 40 milliGRAM(s) Oral before breakfast  tamsulosin 0.4 milliGRAM(s) Oral at bedtime      10. PT/OT/Speech/Rehab/S&Sw/ Cognitive eval results and recommendations: pending    11. Exam:    Vital Signs Last 24 Hrs  T(C): 36.6 (08 Jun 2020 14:18), Max: 37.1 (07 Jun 2020 19:43)  T(F): 97.8 (08 Jun 2020 14:18), Max: 98.7 (07 Jun 2020 19:43)  HR: 87 (08 Jun 2020 14:18) (62 - 103)  BP: 125/76 (08 Jun 2020 14:18) (119/77 - 140/77)  BP(mean): --  RR: 18 (08 Jun 2020 14:18) (18 - 18)  SpO2: 99% (08 Jun 2020 14:18) (99% - 100%)    12.   NIH STROKE SCALE  Item	                                                        Score  1 a.	Level of Consciousness	               	0  1 b. LOC Questions	                                0  1 c.	LOC Commands	                               	0  2.	Best Gaze	                                        0  3.	Visual	                                                0  4.	Facial Palsy	                                        0  5 a.	Motor Arm - Left	                                0  5 b.	Motor Arm - Right	                        0  6 a.	Motor Leg - Left	                                0  6 b.	Motor Leg - Right	                                0  7.	Limb Ataxia	                                        0  8.	Sensory	                                                0  9.	Language	                                       1  10.	Dysarthria	                                        0  11.	Extinction and Inattention  	        0  ______________________________________  TOTAL	                                                        0      NIHSS today: 1    mRS:1  0 No symptoms at all  1 No significant disability despite symptoms; able to carry out all usual duties and activities without assistance  2 Slight disability; unable to carry out all previous activities, but able to look after own affairs  3 Moderate disability; requiring some help, but able to walk without assistance  4 Moderately severe disability; unable to walk without assistance and unable to attend to own bodily needs without assistance  5 Severe disability; bedridden, incontinent and requiring constant nursing care and attention  6 Dead

## 2020-06-08 NOTE — PROGRESS NOTE ADULT - SUBJECTIVE AND OBJECTIVE BOX
Patient is a 89y old  Male who presents with a chief complaint of Altered Mental Status (2020 11:56)    HPI:  89 year old male patient with past medical history of CAD s/p CABG, Parkinson's disease, Iron deficiency anemia, folic acid deficiency, benign prostatic hyperplasia, brought by EMS from home for altered mental status.   The history was provided by the grand-daughter Flor as the patient is unable to speak. She states that the patient has been complaining of generalized weakness for the past 2 days, he woke up this morning able to speak and walk. He has been having constipation so he took a laxative. The family noticed that he has been in the bathroom for a whole, when his daughter checked on him he was sitting on the toilet bowl, unable to talk, staring, unresponsive, the daughter also noticed left facial droop. He was also having severe diarrhea.     In the ED : /68,  / min, RR 18, SpO2 97 % on room air. Stroke code was called and the patient was found to have an NIHSS of 9 (unable to perform tasks, does not answer questions, global aphasia and severe dysarthria). CT Head no contrast was done and showed no evidence of intra-cranial hemorrhage. patient was not a tPA candidate because he was outside the time window and was not a candidate for IA intervention as his m-RS was 4. CBC showing mild leucocytosis and thrombocytopenia, BMP showing a creatinine of 1.6 (No baseline to compare). Chest x ray done and clear per my read (official report still pending). The patient was given Aspirin 325 mg and was admitted to the stroke unit for further workup (2020 21:06)    PAST MEDICAL & SURGICAL HISTORY:  Coronary artery disease  Parkinson disease  S/P CABG (coronary artery bypass graft)      Vital Signs Last 24 Hrs  T(C): 36.6 (2020 07:48), Max: 37.1 (2020 19:43)  T(F): 97.8 (2020 07:48), Max: 98.7 (2020 19:43)  HR: 102 (2020 07:48) (62 - 103)  BP: 123/98 (2020 07:48) (119/77 - 140/77)  BP(mean): --  RR: 18 (2020 07:48) (18 - 20)  SpO2: 99% (2020 07:48) (97% - 100%)               PE:  GEN-NAD, elderly/cachectic, +shaking hand tremors,   PULM- Clear to auscultation bilaterally, fair air entry  CVS- +s1/s2 RRR no murmurs  GI- soft NT ND +bs, no rebound, no guarding  EXT- no edema               10.3   7.30  )-----------( 89       ( 2020 05:26 )             32.6     06-07    139  |  104  |  32<H>  ----------------------------<  94  4.2   |  22  |  1.2    Ca    9.0      2020 05:26  Mg     1.8     06-07    TPro  6.2  /  Alb  3.5  /  TBili  0.4  /  DBili  x   /  AST  15  /  ALT  <5  /  AlkPhos  81  06-07    CARDIAC MARKERS ( 2020 05:26 )  x     / 0.02 ng/mL / x     / x     / x      CARDIAC MARKERS ( 2020 00:36 )  x     / 0.02 ng/mL / 290 U/L / x     / 10.7 ng/mL  CARDIAC MARKERS ( 2020 19:00 )  x     / 0.02 ng/mL / 39 U/L / x     / 1.9 ng/mL      Urinalysis Basic - ( 2020 20:24 )    Color: Yellow / Appearance: Clear / S.028 / pH: x  Gluc: x / Ketone: Negative  / Bili: Negative / Urobili: <2 mg/dL   Blood: x / Protein: 30 mg/dL / Nitrite: Negative   Leuk Esterase: Negative / RBC: 7 /HPF / WBC 1 /HPF   Sq Epi: x / Non Sq Epi: 1 /HPF / Bacteria: Negative          MEDICATIONS  (STANDING):  aspirin  chewable 81 milliGRAM(s) Oral daily  atorvastatin 80 milliGRAM(s) Oral at bedtime  carbidopa/levodopa  25/100 1 Tablet(s) Oral three times a day  dicyclomine 10 milliGRAM(s) Oral three times a day before meals  ferrous    sulfate 325 milliGRAM(s) Oral daily  finasteride 5 milliGRAM(s) Oral daily  folic acid 1 milliGRAM(s) Oral daily  heparin   Injectable 5000 Unit(s) SubCutaneous every 8 hours  memantine 10 milliGRAM(s) Oral daily  metoprolol succinate ER 25 milliGRAM(s) Oral daily  pancrelipase  (CREON 36,000 Lipase Units) 1 Capsule(s) Oral daily  pantoprazole    Tablet 40 milliGRAM(s) Oral before breakfast  sodium chloride 0.9%. 1000 milliLiter(s) (75 mL/Hr) IV Continuous <Continuous>  tamsulosin 0.4 milliGRAM(s) Oral at bedtime

## 2020-06-08 NOTE — ED ADULT NURSE REASSESSMENT NOTE - NS ED NURSE REASSESS COMMENT FT1
pt aox3, at this time RRT called for episode of tachycardia to 180s. pt was tremulous at time secondary to Parkinsons, md reviewed ekg/ no intervention needed as per md. pt denies pain / discomfort. states "I feel fine." pt diagnosed with new onset Afib as of 6/6, Called md at [phone] to discuss intervention for a fib. md states pt was workup for stroke / fall risk so we are not placing him on anticoagulants. no neuro changes on assessment, full neuro assessment documented in flow sheet. vss. pt remains on cardiac monitor. Will continue to monitor /assess

## 2020-06-08 NOTE — PHARMACOTHERAPY INTERVENTION NOTE - COMMENTS
Prescriber was contacted with recommendation to change Creon 36,000 units po qd to Creon 12,000 units 3caps po qd.

## 2020-06-08 NOTE — PROGRESS NOTE ADULT - SUBJECTIVE AND OBJECTIVE BOX
DAILY PROGRESS NOTE  ===========================================================    Patient Information:  ALEXY PURDY  /  89y  /  Male  /  MRN#: [id number]    Hospital Day: 2d     |:::::::::::::::::::::::::::| SUBJECTIVE |:::::::::::::::::::::::::::|    OVERNIGHT EVENTS:  None reported.  TODAY: Patient was seen today at bedside. The patient had no physical complaints this morning. He denied chest pain, shortness of breath, abdominal pain. He only spoke to his frustration with difficulty finding words to converse. He also reported he wanted to be discharge. Review of systems is otherwise negative.    |:::::::::::::::::::::::::::| OBJECTIVE |:::::::::::::::::::::::::::|    VITAL SIGNS: Last 24 Hours    T(F): 97.8 (2020 07:48), Max: 98.7 (2020 19:43)  HR: 102 (2020 07:48) (62 - 103)  BP: 123/98 (2020 07:48) (119/77 - 140/77)  RR: 18 (2020 07:48) (18 - 20)  SpO2: 99% (2020 07:48) (97% - 100%)    20 @ 07:01  -  20 @ 07:00  --------------------------------------------------------  IN: 0 mL / OUT: 4 mL / NET: -4 mL    PHYSICAL EXAM:  GENERAL:   Awake, alert; NAD; Difficulty finding words; UE tremor unchanged at rest.  HEENT:  Head NC/AT; Conjunctivae pink, Sclera anicteric; Oral mucosa moist.  CARDIO:   Increased rate; IRRegular rhythm; S1 & S2.  RESP:   No rales, wheezing, or rhonchi appreciated.  GI:   Soft; NT/ND; BS; No guarding; No rebound tenderness.  EXT:   Strength relatively in tact given age; No edema.   SKIN:   Intact.    LAB RESULTS:                        11.2   8.98  )-----------( 88       ( 2020 05:25 )             33.9     138  |  102  |  29<H>  ----------------------------<  103<H>  4.0   |  23  |  1.2    Ca      9.1       Mg     1.8        TPro  6.7  /  Alb  3.8  /  TBili  0.5  /  DBili  x   /  AST  15  /  ALT  <5  /  AlkPhos  85      PT/INR - ( 2020 05:26 )   PT: 12.90 sec;   INR: 1.12 ratio    PTT - ( 2020 05:26 )  PTT:30.5 sec    Urinalysis Basic - ( 2020 20:24 )  Color: Yellow / Appearance: Clear / S.028 / pH: x  Gluc: x / Ketone: Negative  / Bili: Negative / Urobili: <2 mg/dL   Blood: x / Protein: 30 mg/dL / Nitrite: Negative   Leuk Esterase: Negative / RBC: 7 /HPF / WBC 1 /HPF   Sq Epi: x / Non Sq Epi: 1 /HPF / Bacteria: Negative    Troponin T, Serum: 0.02 ng/mL <H> (20 @ 10:58)    MICROBIOLOGY:  Culture - Blood (collected 2020 00:36)  Source: .Blood None  Preliminary Report (2020 07:01):    No growth to date.    Culture - Urine (collected 2020 20:24)  Source: .Urine Clean Catch (Midstream)  Final Report (2020 21:42):    <10,000 CFU/mL Normal Urogenital Nica    RADIOLOGY:  MRI positive for acute ischemic stroke    ALLERGIES:  No Known Allergies  ===========================================================

## 2020-06-08 NOTE — PROGRESS NOTE ADULT - ASSESSMENT
a/p:    #AMS-Aphasia +CVA  < from: MR Head No Cont (06.07.20 @ 09:17) >  IMPRESSION:    Patchy foci of hyperintense signal intensity on the T2, FLAIR and diffusion-weighted images in the left temporal lobe, left insular cortex and small focus in the left parietal lobe consistent with an acute ischemic change.    Spoke with MAXIMILIAN ALBA NP on 6/7/2020 12:24 PM with readback.    < end of copied text >  -check hba1c, echo, lipid profile  -f/u EEG  -neurology following - folow up on change in antiplts - cva on asa vs AC if pt is a candidate - PD - risk of fall    -cont asa, statin for now  -no tpa given (out of window)  -PT/OT/sp swallow eval    #new onset AF/Aflutter  -tele monitoring  -cardiology consult  -rate control (cont with bblocker- metoprolol)      # NAVI on CKD3  -monitor bmp  -ua small blood/protein- f/u ulytes    Creatinine: 1.2 (06-07 @ 10:58)    Creatinine: 1.2 (06-07 @ 05:26)    Creatinine: 1.6 (06-06 @ 19:00)        #parkinsons disease  -cont sinemet  - PT/rehab    #DVT/GI ppx  FULL CODE    guarded prognosis a/p:    #AMS-Aphasia resolved+CVA  < from: MR Head No Cont (06.07.20 @ 09:17) >  IMPRESSION:    Patchy foci of hyperintense signal intensity on the T2, FLAIR and diffusion-weighted images in the left temporal lobe, left insular cortex and small focus in the left parietal lobe consistent with an acute ischemic change.    Spoke with MAXIMILIAN ALBA NP on 6/7/2020 12:24 PM with readback.    < end of copied text >      - dyarthria likely due to parkinsons -  seen by s&s    -check hba1c, echo, lipid profile  -f/u EEG  -neurology following - folow up on change in antiplts - cva on asa vs AC if pt is a candidate - PD - risk of fall    -cont asa, statin for now  -no tpa given (out of window)  -PT/OT/sp swallow eval    #new onset AF/Aflutter  -tele monitoring  -cardiology consult  -rate control (cont with bblocker- metoprolol)      # NAVI on CKD3  -monitor bmp  -ua small blood/protein- f/u ulytes    Creatinine: 1.2 (06-07 @ 10:58)    Creatinine: 1.2 (06-07 @ 05:26)    Creatinine: 1.6 (06-06 @ 19:00)        #parkinsons disease  -cont sinemet  - PT/rehab    #DVT/GI ppx  FULL CODE    guarded prognosis a/p:    #AMS-Aphasia resolved+CVA  < from: MR Head No Cont (06.07.20 @ 09:17) >  IMPRESSION:    Patchy foci of hyperintense signal intensity on the T2, FLAIR and diffusion-weighted images in the left temporal lobe, left insular cortex and small focus in the left parietal lobe consistent with an acute ischemic change.    Spoke with MAXIMILIAN ALBA NP on 6/7/2020 12:24 PM with readback.    < end of copied text >      - dyarthria likely due to parkinsons -  seen by s&s    -check hba1c, echo, lipid profile  -f/u EEG  -neurology following - folow up on change in antiplts - cva on asa vs AC if pt is a candidate - PD - risk of fall    -cont asa, statin for now  -no tpa given (out of window)  -PT/OT/sp swallow eval    #new onset AF/Aflutter  -tele monitoring  -cardiology consult  -rate control (cont with bblocker- metoprolol)      # NAVI on CKD3  -monitor bmp  -ua small blood/protein- f/u ulytes    Creatinine: 1.2 (06-07 @ 10:58)    Creatinine: 1.2 (06-07 @ 05:26)    Creatinine: 1.6 (06-06 @ 19:00)        #parkinsons disease  -cont sinemet  - PT/rehab    #DVT/GI ppx  FULL CODE    guarded prognosis      echo  eeg   pending  discussed with granddaughter on phone  plan is home with wife with 24/7 aide in place already  she is open to AC with close monitoring if neuro thinks he should be anticoagulated

## 2020-06-09 PROCEDURE — 93010 ELECTROCARDIOGRAM REPORT: CPT

## 2020-06-09 PROCEDURE — 99233 SBSQ HOSP IP/OBS HIGH 50: CPT

## 2020-06-09 PROCEDURE — 70498 CT ANGIOGRAPHY NECK: CPT | Mod: 26

## 2020-06-09 PROCEDURE — 99223 1ST HOSP IP/OBS HIGH 75: CPT

## 2020-06-09 PROCEDURE — 70496 CT ANGIOGRAPHY HEAD: CPT | Mod: 26

## 2020-06-09 RX ORDER — METOPROLOL TARTRATE 50 MG
2.5 TABLET ORAL ONCE
Refills: 0 | Status: COMPLETED | OUTPATIENT
Start: 2020-06-09 | End: 2020-06-09

## 2020-06-09 RX ADMIN — Medication 25 MILLIGRAM(S): at 05:57

## 2020-06-09 RX ADMIN — CARBIDOPA AND LEVODOPA 1 TABLET(S): 25; 100 TABLET ORAL at 05:57

## 2020-06-09 RX ADMIN — CARBIDOPA AND LEVODOPA 1 TABLET(S): 25; 100 TABLET ORAL at 13:40

## 2020-06-09 RX ADMIN — FINASTERIDE 5 MILLIGRAM(S): 5 TABLET, FILM COATED ORAL at 10:27

## 2020-06-09 RX ADMIN — ATORVASTATIN CALCIUM 80 MILLIGRAM(S): 80 TABLET, FILM COATED ORAL at 22:04

## 2020-06-09 RX ADMIN — Medication 2.5 MILLIGRAM(S): at 19:00

## 2020-06-09 RX ADMIN — Medication 10 MILLIGRAM(S): at 05:57

## 2020-06-09 RX ADMIN — HEPARIN SODIUM 5000 UNIT(S): 5000 INJECTION INTRAVENOUS; SUBCUTANEOUS at 05:42

## 2020-06-09 RX ADMIN — Medication 1 MILLIGRAM(S): at 10:27

## 2020-06-09 RX ADMIN — CLOPIDOGREL BISULFATE 75 MILLIGRAM(S): 75 TABLET, FILM COATED ORAL at 09:19

## 2020-06-09 RX ADMIN — Medication 3 CAPSULE(S): at 09:17

## 2020-06-09 RX ADMIN — TAMSULOSIN HYDROCHLORIDE 0.4 MILLIGRAM(S): 0.4 CAPSULE ORAL at 22:05

## 2020-06-09 RX ADMIN — HEPARIN SODIUM 5000 UNIT(S): 5000 INJECTION INTRAVENOUS; SUBCUTANEOUS at 13:40

## 2020-06-09 RX ADMIN — MEMANTINE HYDROCHLORIDE 10 MILLIGRAM(S): 10 TABLET ORAL at 10:27

## 2020-06-09 RX ADMIN — HEPARIN SODIUM 5000 UNIT(S): 5000 INJECTION INTRAVENOUS; SUBCUTANEOUS at 22:04

## 2020-06-09 RX ADMIN — CARBIDOPA AND LEVODOPA 1 TABLET(S): 25; 100 TABLET ORAL at 22:04

## 2020-06-09 RX ADMIN — Medication 325 MILLIGRAM(S): at 10:27

## 2020-06-09 RX ADMIN — PANTOPRAZOLE SODIUM 40 MILLIGRAM(S): 20 TABLET, DELAYED RELEASE ORAL at 05:57

## 2020-06-09 RX ADMIN — Medication 10 MILLIGRAM(S): at 10:44

## 2020-06-09 RX ADMIN — Medication 81 MILLIGRAM(S): at 09:18

## 2020-06-09 NOTE — SWALLOW BEDSIDE ASSESSMENT ADULT - SLP PERTINENT HISTORY OF CURRENT PROBLEM
89 y.o M brought in for AMS, decreased eating, drinking, and speaking. PMhx: CAD s/p CABG, Parkinson's disease , Iron deficiency , folic acid deficiency , recent underwent TURBT for large bladder tumor, only partially resected. Pathology consistent with urothelial Ca. 89 y.o M brought in for AMS, decreased eating, drinking, and speaking. MRI - small focus of T2 hyperintense signal in L cerebellar hemisphere likely represents ischemic change. PMhx: CAD s/p CABG, Parkinson's disease , Iron deficiency , folic acid deficiency , recent underwent TURBT for large bladder tumor, only partially resected. Pathology consistent with urothelial Ca. 89 y.o M brought in for AMS, decreased eating, drinking, and speaking. MRI - small focus of T2 hyperintense signal in L cerebellar hemisphere likely represents ischemic change. PMhx: CAD s/p CABG, Parkinson's disease , Iron deficiency , folic acid deficiency . 89 y.o M brought in for AMS,  c/o generalized weakness x 2 days, difficulty speaking, unresponsive on toilet, daughter observed L facial droop. Severe diarrhea. CTH negative. no TPA ,  MRI - small focus of T2 hyperintense signal in L cerebellar hemisphere likely represents ischemic change. CBC showed mild leukocytosis and thrombocytopenia.  PMhx: CAD s/p CABG, Parkinson's disease , Iron deficiency , folic acid deficiency . benign prostatic hyperplasia.

## 2020-06-09 NOTE — ADVANCED PRACTICE NURSE CONSULT - RECOMMEDATIONS
PI Prevention:   -Continue turning/positioning patient from side-to-side q2h while in bed, q1h when/if OOB chair, or in accordance w/ pt's plan of care. Utilize pillows and/or z-lisa fluidized positioner to assist w/ turning/positioning in bed. When/if OOB chair, utilize chair cushion to offload pressure.   -Offload heels from bed surface with soft pillow under calfs or by applying z-flex fluidized offloading boots to BLEs.   -Apply Coloplast Eldon Protect moisture barrier cream to buttock and perineal area daily and prn after each incontinent episode.    -Continue utilizing one underpad underneath patient to contain incontinence episodes; change pad when saturated/soiled.   -Consider utilizing condom catheter (if patient candidate) to contain any urinary incontinence.   -Assess skin qshift, report changes to appropriate provider.     Plan of Care: Primary RN Sujata at bedside & aware of all above recs. Signing off on patient, no further needs/recs from [facility] service at this time. Staff RN to perform routine skin assessment and manage skin care. Questions or concerns or if reconsult needed, please contact [facility], [phone].

## 2020-06-09 NOTE — ADVANCED PRACTICE NURSE CONSULT - ASSESSMENT
89 year old male w/ PMH significant for CAD s/p CABG, Parkinson's disease, Iron deficiency anemia, folic acid deficiency, benign prostatic hyperplasia, brought by EMS for altered mental status,  inability to speak since waking on day of presentation. Stroke code called on arrival. NIHSS 9 for aphasia and inability to answer questions.   Out of the window for IV thrombolytics. Patient's modified frank scale is >2 therefore is not a candidate for IA intervention. Today following commands without focal weakness, patient with persistent expressive aphasia. MRI brain revealed acute infarcts in the left insular cortex and small focus in the left parietal lobe. Found to have new onset Afib/flutter.      Received patient in ED, laying supine in bed, turned to left side (pillow under right side), HOB elevated 30 degrees. Pt awake, non-verbal. With assistance from primary RN Sujata turned patient to right side for skin assessment. Brown hyperpigmentation to intergluteal cleft. Skin intact, no pressure injuries.    Patient bedbound, very limited mobility in bed. Incontinent of urine and stool. Ordered for dysphagia  diet, adequate intake as per reported Josesito score.

## 2020-06-09 NOTE — PROGRESS NOTE ADULT - ASSESSMENT
13. Impression:  89 year old gentleman with a past medical history of parkinsons disease presents to the ED with inability to speak since waking on day of presentation. Stroke code called on arrival. Out of the window for IV thrombolytics. Patients modified frank scale is >2 therefore is not a candidate for IA intervention. Today following commands without focal weakness, patient with persistent expressive aphasia. MRI brain revealed acute infarcts in the left insular cortex and small focus in the left parietal lobe. Found to have new onset Afib/flutter.       14. Probable cause/s of Stroke:  Unknown at this time will have a better understanding post stroke workup.     15. Suggestions:   Routine stroke workup including:  CTA head and neck for suggested atherosclerotic plaques.  Suggest Lipitor 80 mg qhs    ASA 81 mg daily.  Continue Plavix  75 mg daily.  Avoid dehydration, hypotension, hypovolemia.   Telemetry monitoring.   Q4 neuro checks.     16. Disposition:  Stroke unit.     Raymundo Samuel NP  [phone] 13. Impression:  89 year old gentleman with a past medical history of parkinsons disease presents to the ED with inability to speak since waking on day of presentation. Stroke code called on arrival. Out of the window for IV thrombolytics. Patients modified frank scale is >2 therefore is not a candidate for IA intervention. Today following commands without focal weakness, patient with persistent expressive aphasia. MRI brain revealed acute infarcts in the left insular cortex and small focus in the left parietal lobe. Found to have new onset Afib/flutter.       14. Probable cause/s of Stroke:  Patients stroke more likely to be due to vessel to vessel thromboemboli due to significant atherosclerotic plaques, and less likely due to Afib.     15. Suggestions:   Routine stroke workup including:  Suggest Lipitor 80 mg qhs    Continue ASA 81 mg daily.  Continue Plavix  75 mg daily.  After 7 days can start oral coagulation and stop dual antiplatelet therapy provided antiplatelet not needed from another perspective. Also provided the blood pressure is kept less than 140/80.   Avoid dehydration, hypotension, hypovolemia.   Maintain hydration.   Telemetry monitoring.   Q4 neuro checks.     16. Disposition:  Stroke unit.     Raymundo Samuel NP  [id number] 13. Impression:  89 year old gentleman with a past medical history of parkinsons disease presents to the ED with inability to speak since waking on day of presentation. Stroke code called on arrival. Out of the window for IV thrombolytics. Patients modified frank scale is >2 therefore is not a candidate for IA intervention. Today following commands without focal weakness, patient with persistent expressive aphasia. MRI brain revealed acute infarcts in the left insular cortex and small focus in the left parietal lobe. Found to have new onset Afib/flutter.       14. Probable cause/s of Stroke:  Patients stroke more likely to be due to vessel to vessel thromboemboli due to significant atherosclerotic plaques, and less likely due to Afib.     15. Suggestions:   Routine stroke workup including:  Suggest Lipitor 80 mg qhs    Continue ASA 81 mg daily.  Continue Plavix  75 mg daily.  After 7 days from initial symptoms can start oral coagulation and stop dual antiplatelet therapy provided antiplatelet not needed from another perspective. Also provided the blood pressure is kept less than 140/80.   Avoid dehydration, hypotension, hypovolemia.   Maintain hydration.   Telemetry monitoring.   Q4 neuro checks.     16. Disposition:  Stroke unit.     Raymundo Samuel NP  [phone] 13. Impression:  89 year old gentleman with a past medical history of parkinsons disease presents to the ED with inability to speak since waking on day of presentation. Stroke code called on arrival. Out of the window for IV thrombolytics. Patients modified frank scale is >2 therefore is not a candidate for IA intervention. Today following commands without focal weakness, patient with persistent expressive aphasia. MRI brain revealed acute infarcts in the left insular cortex and small focus in the left parietal lobe. Found to have new onset Afib/flutter.       14. Probable cause/s of Stroke:  Patients stroke more likely to be due to vessel to vessel thromboemboli due to significant atherosclerotic plaques, and less likely due to Afib.     15. Suggestions:   Routine stroke workup including:  Suggest Lipitor 80 mg qhs  Continue ASA 81 mg daily.  Continue Plavix  75 mg daily.  After 7 days from initial symptoms can start oral coagulation and stop dual antiplatelet therapy provided antiplatelet not needed from another perspective. Also provided the blood pressure is kept less than 140/80.   Avoid dehydration, hypotension, hypovolemia.   Maintain hydration.   Telemetry monitoring.   Q4 neuro checks.     16. Disposition:  As per rehab, follow up with the stroke clinic in 2 weeks.   Discussed with Dr. Fuller.     Raymundo Samuel NP  [id number] 13. Impression:  89 year old gentleman with a past medical history of parkinsons disease presents to the ED with inability to speak since waking on day of presentation. Stroke code called on arrival. Out of the window for IV thrombolytics. Patients modified frank scale is >2 therefore is not a candidate for IA intervention. Today following commands without focal weakness, patient with persistent expressive aphasia. MRI brain revealed acute infarcts in the left insular cortex and small focus in the left parietal lobe. Found to have new onset Afib/flutter.       14. Probable cause/s of Stroke:  Patients stroke more likely to be due to vessel to vessel thromboemboli due to significant atherosclerotic plaques, and less likely due to Afib.     15. Suggestions:   Routine stroke workup including:  Suggest Lipitor 80 mg qhs  Continue ASA 81 mg daily.  Continue Plavix  75 mg daily.  After 7 days from initial symptoms can start oral anticoagulation and stop dual antiplatelet therapy provided antiplatelet not needed from another perspective. Also provided the blood pressure is kept less than 140/80.   Avoid dehydration, hypotension, hypovolemia.   Maintain hydration.   Telemetry monitoring.   Q4 neuro checks.     16. Disposition:  As per rehab, follow up with the stroke clinic in 2 weeks.   Discussed with Dr. Fuller.     Raymundo Samuel NP  [phone]

## 2020-06-09 NOTE — SWALLOW BEDSIDE ASSESSMENT ADULT - ORAL PHASE
Within functional limits min stasis/Lingual stasis/Decreased anterior-posterior movement of the bolus

## 2020-06-09 NOTE — PROGRESS NOTE ADULT - SUBJECTIVE AND OBJECTIVE BOX
Stroke Progress Note:    ALEXY PURDY  1. Chief Complaint: Aphasia    HPI:  89 year old gentleman with a past medical history of parkinsons disease presents to the ED with inability to speak since waking on day of presentation. Stroke code called on arrival. NIHSS 9 for aphasia and inability to answer questions.   Out of the window for IV thrombolytics.     2. Relevant PMH:   Prior ischemic stroke/TIA[ ], Afib [ ], CAD [ ], HTN [ ], DLD [ ], DM [ ], PVD [ ], Obesity [ ],   Sedentary lifestyle [ ], CHF [ ], TEX [ ], Cancer Hx [ ].    3. Social History: Smoking [ ], Drug Use [ ], Alcohol Use [ ], Other [ ]    4. Possible Location of Stroke:   Left temporal lobe, left insular cortex and small focus in the left parietal lobe consistent with an acute ischemic change.    5. Relevant Brain Tissue Imaging:  < from: MR Head No Cont (06.07.20 @ 09:17) >  IMPRESSION:    Patchy foci of hyperintense signal intensity on the T2, FLAIR and diffusion-weighted images in the left temporal lobe, left insular cortex and small focus in the left parietal lobe consistent with an acute ischemic change.      6. Relevant Cerebrovascular Imaging:    < from: MR Angio Neck w/wo IV Cont (06.07.20 @ 09:19) >  IMPRESSION:    Unremarkable MRA of the neck.    < from: MR Angio Head No Cont (06.07.20 @ 09:17) >  IMPRESSION:    Unremarkable MRA of the brain.      7. Relevant blood tests:    Direct LDL: 106    8. Relevant cardiac rhythm monitoring:  New onset Afib flutter  9. Relevant Cardiac Structure: (TTE/MARIAH +/-):[ ]No intracardiac thrombus/[ ] no vegetation/[ ]no [place]/EF:  < from: Transthoracic Echocardiogram (06.08.20 @ 11:13) >      Summary:   1. LV Ejection Fraction by Snowden's Method with a biplane EF of 53 %.   2. Normal left ventricular size and wall thicknesses, with normal systolic and diastolic function.   3. The left ventricular diastolic function could not be assessed in this study.   4. Degenerative mitral valve.   5. Mild mitral valve regurgitation.   6. Moderate thickening and calcification of the anterior and posterior mitral valve leaflets.   7. Severe mitral annular calcification.   8. Sclerotic aortic valve with normal opening.   9. Estimated pulmonary artery systolic pressure is 45.2 mmHg assuming a right atrial pressure of 3 mmHg, which is consistent with mild pulmonary hypertension.  10. Pulmonary hypertension is present.  11. Color flow doppler and intravenous injection of agitated saline demonstrates the presence of an intact intra atrial septum.  12. LA volume Index is 29.2 ml/m² ml/m2.        Home Medications:  Amitiza 24 mcg oral capsule: 1 cap(s) orally once a day (06 Jun 2020 22:09)  aspirin 81 mg oral tablet: 1 tab(s) orally once a day (06 Jun 2020 22:09)  carbidopa-levodopa 25 mg-100 mg oral tablet: 1 tab(s) orally 3 times a day (06 Jun 2020 22:09)  Creon 36,000 units oral delayed release capsule: 1 cap(s) orally once a day with breakfast (06 Jun 2020 22:09)  Dexilant 60 mg oral delayed release capsule: 1 cap(s) orally once a day (06 Jun 2020 22:09)  dicyclomine 10 mg oral capsule: 1 cap(s) orally once a day (06 Jun 2020 22:09)  ferrous sulfate 325 mg (65 mg elemental iron) oral tablet: 1 tab(s) orally 3 times a day (06 Jun 2020 22:09)  finasteride 5 mg oral tablet: 1 tab(s) orally once a day (06 Jun 2020 22:09)  folic acid 1 mg oral tablet: 1 tab(s) orally once a day (06 Jun 2020 22:09)  furosemide 20 mg oral tablet: 1 tab(s) orally once a day, As Needed (06 Jun 2020 22:09)  gabapentin 300 mg oral capsule: 1 cap(s) orally once a day (06 Jun 2020 22:09)  Janumet 50 mg-500 mg oral tablet: 0.5 tab(s) orally once a day, As Needed (06 Jun 2020 22:09)  memantine 14 mg oral capsule, extended release: 1 cap(s) orally once a day (06 Jun 2020 22:09)  Metoprolol Succinate ER 25 mg oral tablet, extended release: 1 tab(s) orally once a day (06 Jun 2020 22:09)  raNITIdine 300 mg oral tablet: 1 tab(s) orally once a day (at bedtime) (06 Jun 2020 22:09)  rosuvastatin 20 mg oral tablet: 1 tab(s) orally once a day (06 Jun 2020 22:09)  tamsulosin 0.4 mg oral capsule: 1 cap(s) orally once a day (06 Jun 2020 22:09)      MEDICATIONS  (STANDING):  aspirin  chewable 81 milliGRAM(s) Oral daily  atorvastatin 80 milliGRAM(s) Oral at bedtime  carbidopa/levodopa  25/100 1 Tablet(s) Oral three times a day  clopidogrel Tablet 75 milliGRAM(s) Oral daily  dicyclomine 10 milliGRAM(s) Oral three times a day before meals  ferrous    sulfate 325 milliGRAM(s) Oral daily  finasteride 5 milliGRAM(s) Oral daily  folic acid 1 milliGRAM(s) Oral daily  heparin   Injectable 5000 Unit(s) SubCutaneous every 8 hours  memantine 10 milliGRAM(s) Oral daily  metoprolol succinate ER 25 milliGRAM(s) Oral daily  pancrelipase  (CREON 12,000 Lipase Units) 3 Capsule(s) Oral with breakfast  pantoprazole    Tablet 40 milliGRAM(s) Oral before breakfast  tamsulosin 0.4 milliGRAM(s) Oral at bedtime      10. PT/OT/Speech/Rehab/S&Sw/ Cognitive eval results and recommendations:pending    11. Exam:    Vital Signs Last 24 Hrs  T(C): 36.3 (09 Jun 2020 07:34), Max: 36.8 (08 Jun 2020 22:00)  T(F): 97.4 (09 Jun 2020 07:34), Max: 98.3 (08 Jun 2020 22:00)  HR: 92 (09 Jun 2020 07:34) (87 - 108)  BP: 114/69 (09 Jun 2020 07:34) (114/69 - 153/92)  BP(mean): --  RR: 17 (09 Jun 2020 07:34) (17 - 18)  SpO2: 97% (09 Jun 2020 07:34) (96% - 99%)    12.   NIH STROKE SCALE  Item	                                                        Score  1 a.	Level of Consciousness	               	0  1 b. LOC Questions	                                1  1 c.	LOC Commands	                               	0  2.	Best Gaze	                                        0  3.	Visual	                                                0  4.	Facial Palsy	                                        0  5 a.	Motor Arm - Left	                                0  5 b.	Motor Arm - Right	                        0  6 a.	Motor Leg - Left	                                0  6 b.	Motor Leg - Right	                                0  7.	Limb Ataxia	                                        0  8.	Sensory	                                                0  9.	Language	                                       1  10.	Dysarthria	                                        0  11.	Extinction and Inattention  	        0  ______________________________________  TOTAL	                                                    2  ([place]  services used with RN)    NIHSS today: 2    mRS:2  0 No symptoms at all  1 No significant disability despite symptoms; able to carry out all usual duties and activities without assistance  2 Slight disability; unable to carry out all previous activities, but able to look after own affairs  3 Moderate disability; requiring some help, but able to walk without assistance  4 Moderately severe disability; unable to walk without assistance and unable to attend to own bodily needs without assistance  5 Severe disability; bedridden, incontinent and requiring constant nursing care and attention  6 Dead

## 2020-06-09 NOTE — PROGRESS NOTE ADULT - ATTENDING COMMENTS
Patient seen and examined independently. Agree with resident note/ history / physical exam and plan of care with following exceptions/additions/updates. Case discussed with patient/pt decision maker, house-staff and nursing.   very limited verbal communication. can answer simple questions. no pain.  phone used, pt knows he is in the hospital and knows his name  Full code Patient seen and examined independently. Agree with resident note/ history / physical exam and plan of care with following exceptions/additions/updates. Case discussed with patient/pt decision maker, house-staff and nursing.   very limited verbal communication. can answer simple questions. no pain.  phone used, pt knows he is in the hospital and knows his name  Full code  prognosis guarded.

## 2020-06-09 NOTE — PROGRESS NOTE ADULT - SUBJECTIVE AND OBJECTIVE BOX
DAILY PROGRESS NOTE  ===========================================================    Patient Information:  ALEXY PURDY  /  89y  /  Male  /  MRN#: [id number]    Hospital Day: 3d     |:::::::::::::::::::::::::::| SUBJECTIVE |:::::::::::::::::::::::::::|    OVERNIGHT EVENTS:  None reported.  TODAY: Patient was seen today at bedside.    |:::::::::::::::::::::::::::| OBJECTIVE |:::::::::::::::::::::::::::|    VITAL SIGNS: Last 24 Hours  ICU Vital Signs Last 24 Hrs  T(C): 36.3 (09 Jun 2020 07:34), Max: 36.8 (08 Jun 2020 22:00)  T(F): 97.4 (09 Jun 2020 07:34), Max: 98.3 (08 Jun 2020 22:00)  HR: 77 (09 Jun 2020 13:43) (77 - 108)  BP: 133/93 (09 Jun 2020 13:43) (114/69 - 153/92)  BP(mean): --  ABP: --  ABP(mean): --  RR: 17 (09 Jun 2020 07:34) (17 - 18)  SpO2: 97% (09 Jun 2020 07:34) (96% - 97%)    PHYSICAL EXAM:  GENERAL:   Awake, alert; NAD; Difficulty finding words; UE tremor unchanged at rest.  HEENT:  Head NC/AT; Conjunctivae pink, Sclera anicteric; Oral mucosa moist.  CARDIO:   Increased rate; IRRegular rhythm; S1 & S2.  RESP:   No rales, wheezing, or rhonchi appreciated.  GI:   Soft; NT/ND; BS; No guarding; No rebound tenderness.  EXT:   Strength relatively in tact given age; No edema.   SKIN:   Intact.    LAB RESULTS:                                       11.2   8.98  )-----------( 88       ( 08 Jun 2020 05:25 )             33.9    CAPILLARY BLOOD GLUCOSE    RADIOLOGY:  MRI positive for acute ischemic stroke    ALLERGIES:  No Known Allergies  ===========================================================

## 2020-06-09 NOTE — ED ADULT NURSE REASSESSMENT NOTE - NS ED NURSE REASSESS COMMENT FT1
pt reassessed. hr and b/p controlled after metoprolol iv push given. pt on cardiac monitor resting comfortably I hospital bed. will continue to monitor

## 2020-06-09 NOTE — PROGRESS NOTE ADULT - ASSESSMENT
Altered mental status a/w global aphasia prior to presentation  Secondary to Acute Ischemic Stroke with resultant Expressive Aphasia vs. Dysarthria  - CT Head negative on admission, NIHSS 9; Not a tPA (outside window)' Not an IA candidate (m-RS > 2)  - MRI showed acute ischemic stroke (L-Temporal, L-Insular Cortex; L-Parietal focus)  - Could be resultant from Atrial Fibrillation (new?) and not on AC vs. chronic changes  - Continue ASA 81mg QD and Atorvastatin 80mg QHS  - Starting Plavix; Continue ASA daily  - Holding Gabapentin given change in mentation; No current issues since held  - TTE w/ Bubble unremarkable  - Lipid panel, TSH, and A1c noted  - Follow up CTA Head and Neck  - Continue Neurochecks Q4H  - Aspiration, Seizure and Fall precautions  - Dysphagia 2 diet as per S+S  - PT / OT; Will need placement vs. home if family denies    Paroxysmal Atrial fibrillation; ?New  - Occurred overnight on 6/7/2020: Self resolved, and now back in Fib  - Full AC not initiated during workup for ischemic stroke; Harm may outweigh benefit given Parkinson's and risk of falls  - Will continue home Metoprolol 25mg QD; Currently rate controlled    Elevated Troponin T; Likely chronic  - Likely due to CKD; CKMB was elevated x1 with no events of EKG changes  - Stable and no need to trend further    Acute Kidney Injury; Hx of CKD (resolved)  - Creatinine 1.6 on admission; Now improved to 1.2   - Avoid hypotension and nephrotoxic medication     Anemia and Thrombocytopenia; Hx of Multifactorial anemia  - Continue home Ferrous sulfate and Folic acid  - Follow up Vitamin B12 and B9 levels; iron studies    Hx of Parkinson's disease: Continue Carbidopa/Levodopa 25/100 TID + Memantine QD  Hx of CAD s/p CABG: Continue ASA and Statin; Held Metoptolol for permissive HTN; Resume when indicated  Hx of Benign prostatic hyperplasia: Continue Flomax 0.4mg QHS + Finasteride 5mg QD  Hx of GERD: Continue Protonix; Takes Ranitidine and Dexilant at home    GI ppx:   Protonix  DVT ppx:   Heparin SubQ  Activity:   As Tolerated   DISPO:  Patient to be discharged when condition(s) optimized. Will discuss with family desire to send patient to recommend STR vs. home    CODE STATUS:   FULL Altered mental status a/w global aphasia prior to presentation  Secondary to Acute Ischemic Stroke with resultant Expressive Aphasia vs. Dysarthria  - CT Head negative on admission, NIHSS 9; Not a tPA (outside window)' Not an IA candidate (m-RS > 2)  - MRI showed acute ischemic stroke (L-Temporal, L-Insular Cortex; L-Parietal focus)  - Could be resultant from Atrial Fibrillation (new?) and not on AC vs. chronic changes  - Continue ASA 81mg QD and Atorvastatin 80mg QHS  - Starting Plavix; Continue ASA daily  - Holding Gabapentin given change in mentation; No current issues since held  - TTE w/ Bubble unremarkable  - Lipid panel, TSH, and A1c noted  - Follow up CTA Head and Neck  - Continue Neurochecks Q4H  - Aspiration, Seizure and Fall precautions  - Dysphagia 2 diet as per S+S  - PT / OT    Paroxysmal Atrial fibrillation; ?New  - Occurred overnight on 6/7/2020: Self resolved, and now back in Fib  - Full AC not initiated during workup for ischemic stroke;  fu neurology and cardiology rec regarding ac   - Will continue home Metoprolol 25mg QD; Currently rate controlled    Elevated Troponin T; Likely chronic  - Likely due to CKD; CKMB was elevated x1 with no events of EKG changes  - Stable and no need to trend further    Acute Kidney Injury; Hx of CKD (resolved)  - Creatinine 1.6 on admission; Now improved to 1.2   - Avoid hypotension and nephrotoxic medication     Anemia and Thrombocytopenia; Hx of Multifactorial anemia  - Continue home Ferrous sulfate and Folic acid  - Follow up Vitamin B12 and B9 levels; iron studies    Hx of Parkinson's disease: Continue Carbidopa/Levodopa 25/100 TID + Memantine QD  Hx of CAD s/p CABG: was on ASA and Statin; Held Metoprolol for permissive HTN; Resume when indicated. in view of low plt ,   Hx of Benign prostatic hyperplasia: Continue Flomax 0.4mg QHS + Finasteride 5mg QD  Hx of GERD: Continue Protonix; Takes Ranitidine and Dexilant at home    GI ppx:   Protonix  DVT ppx:   Heparin SubQ  Activity:   As Tolerated   DISPO:  Patient to be discharged when condition(s) optimized. Will discuss with family desire to send patient to recommend STR vs. home    CODE STATUS:   FULL Altered mental status a/w global aphasia prior to presentation  Secondary to Acute Ischemic Stroke with resultant Expressive Aphasia vs. Dysarthria  - CT Head negative on admission, NIHSS 9; Not a tPA (outside window)' Not an IA candidate (m-RS > 2)  - MRI showed acute ischemic stroke (L-Temporal, L-Insular Cortex; L-Parietal focus)  - Could be resultant from Atrial Fibrillation (new?) and not on AC vs. chronic changes  - Continue ASA 81mg QD and Atorvastatin 80mg QHS  - Starting Plavix; Continue ASA daily  - Holding Gabapentin given change in mentation; No current issues since held  - TTE w/ Bubble unremarkable  - Lipid panel, TSH, and A1c noted  - Follow up CTA Head and Neck  - Continue Neurochecks Q4H  - Aspiration, Seizure and Fall precautions  - Dysphagia 2 diet as per S+S  - PT / OT    Paroxysmal Atrial fibrillation; ?New  - Occurred overnight on 6/7/2020: Self resolved, and now back in Fib  - Full AC not initiated during workup for ischemic stroke;  fu neurology and cardiology rec regarding ac. high risk of fall and low plt makes him high risk for bleeding.   - Will continue home Metoprolol 25mg QD; Currently rate controlled    Elevated Troponin T; Likely chronic  - Likely due to CKD; CKMB was elevated x1 with no events of EKG changes  - Stable and no need to trend further    Acute Kidney Injury; Hx of CKD (resolved)  - Creatinine 1.6 on admission; Now improved to 1.2   - Avoid hypotension and nephrotoxic medication     Anemia and Thrombocytopenia; Hx of Multifactorial anemia  - Continue home Ferrous sulfate and Folic acid  - Follow up Vitamin B12 and B9 levels; iron studies    Hx of Parkinson's disease: Continue Carbidopa/Levodopa 25/100 TID + Memantine QD  Hx of CAD s/p CABG: was on ASA and Statin; Held Metoprolol for permissive HTN; Resume when indicated. in view of low plt ,   Hx of Benign prostatic hyperplasia: Continue Flomax 0.4mg QHS + Finasteride 5mg QD  Hx of GERD: Continue Protonix; Takes Ranitidine and Dexilant at home    GI ppx:   Protonix  DVT ppx:   Heparin SubQ  Activity:   As Tolerated   DISPO:  Patient to be discharged when condition(s) optimized. Will discuss with family desire to send patient to recommend STR vs. home    CODE STATUS:   FULL

## 2020-06-10 LAB
HCT VFR BLD CALC: 32.4 % — LOW (ref 42–52)
HGB BLD-MCNC: 10.5 G/DL — LOW (ref 14–18)
MCHC RBC-ENTMCNC: 29.7 PG — SIGNIFICANT CHANGE UP (ref 27–31)
MCHC RBC-ENTMCNC: 32.4 G/DL — SIGNIFICANT CHANGE UP (ref 32–37)
MCV RBC AUTO: 91.5 FL — SIGNIFICANT CHANGE UP (ref 80–94)
NRBC # BLD: 0 /100 WBCS — SIGNIFICANT CHANGE UP (ref 0–0)
PLATELET # BLD AUTO: 87 K/UL — LOW (ref 130–400)
RBC # BLD: 3.54 M/UL — LOW (ref 4.7–6.1)
RBC # FLD: 13.7 % — SIGNIFICANT CHANGE UP (ref 11.5–14.5)
WBC # BLD: 8.23 K/UL — SIGNIFICANT CHANGE UP (ref 4.8–10.8)
WBC # FLD AUTO: 8.23 K/UL — SIGNIFICANT CHANGE UP (ref 4.8–10.8)

## 2020-06-10 PROCEDURE — 99223 1ST HOSP IP/OBS HIGH 75: CPT

## 2020-06-10 PROCEDURE — 99233 SBSQ HOSP IP/OBS HIGH 50: CPT

## 2020-06-10 RX ORDER — ASPIRIN/CALCIUM CARB/MAGNESIUM 324 MG
162 TABLET ORAL DAILY
Refills: 0 | Status: DISCONTINUED | OUTPATIENT
Start: 2020-06-10 | End: 2020-06-12

## 2020-06-10 RX ADMIN — ATORVASTATIN CALCIUM 80 MILLIGRAM(S): 80 TABLET, FILM COATED ORAL at 21:59

## 2020-06-10 RX ADMIN — Medication 10 MILLIGRAM(S): at 11:29

## 2020-06-10 RX ADMIN — MEMANTINE HYDROCHLORIDE 10 MILLIGRAM(S): 10 TABLET ORAL at 11:32

## 2020-06-10 RX ADMIN — CARBIDOPA AND LEVODOPA 1 TABLET(S): 25; 100 TABLET ORAL at 13:01

## 2020-06-10 RX ADMIN — CARBIDOPA AND LEVODOPA 1 TABLET(S): 25; 100 TABLET ORAL at 22:21

## 2020-06-10 RX ADMIN — Medication 325 MILLIGRAM(S): at 11:31

## 2020-06-10 RX ADMIN — HEPARIN SODIUM 5000 UNIT(S): 5000 INJECTION INTRAVENOUS; SUBCUTANEOUS at 05:51

## 2020-06-10 RX ADMIN — Medication 1 MILLIGRAM(S): at 11:31

## 2020-06-10 RX ADMIN — HEPARIN SODIUM 5000 UNIT(S): 5000 INJECTION INTRAVENOUS; SUBCUTANEOUS at 13:02

## 2020-06-10 RX ADMIN — Medication 3 CAPSULE(S): at 08:42

## 2020-06-10 RX ADMIN — PANTOPRAZOLE SODIUM 40 MILLIGRAM(S): 20 TABLET, DELAYED RELEASE ORAL at 06:14

## 2020-06-10 RX ADMIN — Medication 10 MILLIGRAM(S): at 05:51

## 2020-06-10 RX ADMIN — Medication 10 MILLIGRAM(S): at 17:02

## 2020-06-10 RX ADMIN — Medication 162 MILLIGRAM(S): at 11:55

## 2020-06-10 RX ADMIN — Medication 25 MILLIGRAM(S): at 05:51

## 2020-06-10 RX ADMIN — FINASTERIDE 5 MILLIGRAM(S): 5 TABLET, FILM COATED ORAL at 11:31

## 2020-06-10 RX ADMIN — TAMSULOSIN HYDROCHLORIDE 0.4 MILLIGRAM(S): 0.4 CAPSULE ORAL at 21:59

## 2020-06-10 RX ADMIN — HEPARIN SODIUM 5000 UNIT(S): 5000 INJECTION INTRAVENOUS; SUBCUTANEOUS at 21:59

## 2020-06-10 RX ADMIN — CARBIDOPA AND LEVODOPA 1 TABLET(S): 25; 100 TABLET ORAL at 05:52

## 2020-06-10 NOTE — PROGRESS NOTE ADULT - SUBJECTIVE AND OBJECTIVE BOX
Hospital Day:  4d    Subjective: Patient is a 89y old  Male who presents with a chief complaint of Altered Mental Status (10 Param 2020 13:39)    Pt seen and evaluated at bedside. No over the night acute events reported.   Pt is [place] speaking.  used. Pt has no complaints. NIHSS 1.     Past Medical Hx:   Coronary artery disease  Parkinson disease    Past Sx:  S/P CABG (coronary artery bypass graft)    Allergies:  No Known Allergies    Current Meds:   Standng Meds:  aspirin 162 milliGRAM(s) Oral daily  atorvastatin 80 milliGRAM(s) Oral at bedtime  carbidopa/levodopa  25/100 1 Tablet(s) Oral three times a day  dicyclomine 10 milliGRAM(s) Oral three times a day before meals  ferrous    sulfate 325 milliGRAM(s) Oral daily  finasteride 5 milliGRAM(s) Oral daily  folic acid 1 milliGRAM(s) Oral daily  heparin   Injectable 5000 Unit(s) SubCutaneous every 8 hours  memantine 10 milliGRAM(s) Oral daily  metoprolol succinate ER 25 milliGRAM(s) Oral daily  pancrelipase  (CREON 12,000 Lipase Units) 3 Capsule(s) Oral with breakfast  pantoprazole    Tablet 40 milliGRAM(s) Oral before breakfast  tamsulosin 0.4 milliGRAM(s) Oral at bedtime    PRN Meds:    HOME MEDICATIONS:  Amitiza 24 mcg oral capsule: 1 cap(s) orally once a day  aspirin 81 mg oral tablet: 1 tab(s) orally once a day  carbidopa-levodopa 25 mg-100 mg oral tablet: 1 tab(s) orally 3 times a day  Creon 36,000 units oral delayed release capsule: 1 cap(s) orally once a day with breakfast  Dexilant 60 mg oral delayed release capsule: 1 cap(s) orally once a day  dicyclomine 10 mg oral capsule: 1 cap(s) orally once a day  ferrous sulfate 325 mg (65 mg elemental iron) oral tablet: 1 tab(s) orally 3 times a day  finasteride 5 mg oral tablet: 1 tab(s) orally once a day  folic acid 1 mg oral tablet: 1 tab(s) orally once a day  furosemide 20 mg oral tablet: 1 tab(s) orally once a day, As Needed  gabapentin 300 mg oral capsule: 1 cap(s) orally once a day  Janumet 50 mg-500 mg oral tablet: 0.5 tab(s) orally once a day, As Needed  memantine 14 mg oral capsule, extended release: 1 cap(s) orally once a day  Metoprolol Succinate ER 25 mg oral tablet, extended release: 1 tab(s) orally once a day  raNITIdine 300 mg oral tablet: 1 tab(s) orally once a day (at bedtime)  rosuvastatin 20 mg oral tablet: 1 tab(s) orally once a day  tamsulosin 0.4 mg oral capsule: 1 cap(s) orally once a day      Vital Signs:   T(F): 97.8 (06-10-20 @ 13:50), Max: 97.8 (06-10-20 @ 13:50)  HR: 75 (06-10-20 @ 13:50) (69 - 102)  BP: 194/87 (06-10-20 @ 13:50) (111/63 - 194/87)  RR: 18 (06-10-20 @ 13:50) (18 - 18)  SpO2: 97% (20 @ 21:10) (97% - 97%)      20 @ 07:01  -  06-10-20 @ 07:00  --------------------------------------------------------  IN: 0 mL / OUT: 200 mL / NET: -200 mL        Physical Exam:   GENERAL: NAD, resting in bed  HEENT: NCAT, slight slurring of speech  CHEST/LUNG: CTAB  HEART: Regular rate and rhythm; s1 s2 appreciated, No murmurs, rubs, or gallops  ABDOMEN: Soft, Nontender, Nondistended; Bowel sounds present  EXTREMITIES: No LE edema b/l, motor and sensory intact throughout.   SKIN: no rashes, no new lesions  NERVOUS SYSTEM:  Alert & Oriented X3    Labs:       Troponin 0.02, CKMB 6.3, CK -- 20 @ 10:58  Troponin 0.02, CKMB --, CK -- 20 @ 05:26  Troponin 0.02, CKMB 10.7,  20 @ 00:36  Troponin 0.02, CKMB 1.9, CK 39 20 @ 19:00    Urinalysis Basic - ( 2020 20:24 )    Color: Yellow / Appearance: Clear / S.028 / pH: x  Gluc: x / Ketone: Negative  / Bili: Negative / Urobili: <2 mg/dL   Blood: x / Protein: 30 mg/dL / Nitrite: Negative   Leuk Esterase: Negative / RBC: 7 /HPF / WBC 1 /HPF   Sq Epi: x / Non Sq Epi: 1 /HPF / Bacteria: Negative    Culture - Blood (collected 20 @ 00:36)  Source: .Blood None  Preliminary Report (20 @ 07:01):    No growth to date.    Radiology:   < from: CT Angio Head + Neckw/ IV Cont (20 @ 14:44) >  IMPRESSION:   1.  No evidence of acute large vessel occlusion.  2.  Scattered calcific plaque with mild to moderate stenoses of the carotid siphons and left vertebral artery.  < end of copied text >      Assessment and Plan:   89 y/o male w/ pMHx of CAD s/p CABG, Parkinsons, anemia, BPH was brought in by EMS for aphasia.     # Acute Ischemic Stroke with aphasia  - likely secondary to vessel to vessel thromboembolic cause, less likely from a-fib  - CT Head negative on admission, NIHSS 9; Not a tPA (outside window)' Not an IA candidate (m-RS > 2)  - MRI showed acute ischemic stroke (L-Temporal, L-Insular Cortex; L-Parietal focus)  - CTA H+N: scattered calcific plaques with mild to mod stenosis of carotid siphons and lt vertebral artery.   - TTE w/ Bubble unremarkable  - Lipid panel, TSH, and A1c noted  - NI: ASA 162mg qd, Atorvastatin 80mg QHS, and d/c plavix  - Holding Gabapentin given change in mentation; No current issues since held  - Aspiration, Seizure and Fall precautions  - Dysphagia 2 diet as per S+S  - PT / OT    # Paroxysmal Atrial fibrillation - new onset  - Full AC not initiated during workup for ischemic stroke;   - fu neurology and cardiology rec regarding AC.  - high risk of fall and low plt makes him high risk for bleeding.   - Will continue home Metoprolol 25mg QD; Currently rate controlled    # Elevated Troponin T; Likely chronic  - Likely due to CKD; CKMB was elevated x1 with no events of EKG changes  - Stable and no need to trend further    # NAVI - resolved  - Creatinine 1.6 on admission; Now improved to 1.2   - Avoid hypotension and nephrotoxic medication     # Anemia and Thrombocytopenia; Hx of Multifactorial anemia  - Continue home Ferrous sulfate and Folic acid  - B12/folate/IRon studies wnl    # Hx of Parkinson's disease: Continue Carbidopa/Levodopa 25/100 TID + Memantine QD  # Hx of CAD s/p CABG: on ASA and Statin   # Hx of Benign prostatic hyperplasia: Continue Flomax 0.4mg QHS + Finasteride 5mg QD  # Hx of GERD: Continue Protonix; Takes Ranitidine and Dexilant at home    GI ppx:   Protonix  DVT ppx:   Heparin SubQ  Activity:   As Tolerated   DISPO:  Home vs STR  CODE STATUS:   FULL

## 2020-06-10 NOTE — SWALLOW BEDSIDE ASSESSMENT ADULT - ASR SWALLOW ASPIRATION MONITOR
oral hygiene/gurgly voice/pneumonia/upper respiratory infection/cough/change of breathing pattern/position upright (90Y)/fever/throat clearing
cough/fever/pneumonia/throat clearing/upper respiratory infection/change of breathing pattern/gurgly voice
cough/oral hygiene/upper respiratory infection/gurgly voice/pneumonia/change of breathing pattern/position upright (90Y)/fever/throat clearing

## 2020-06-10 NOTE — PROGRESS NOTE ADULT - NSHPATTENDINGPLANDISCUSS_GEN_ALL_CORE
the patient, our NV team, Primary team, Rehab team during the multidisciplinary round in the stroke unit.

## 2020-06-10 NOTE — PROGRESS NOTE ADULT - ATTENDING COMMENTS
Patient seen and examined independently earlier today. Case discussed with housestaff, nursing, social work, neuro, PT. I agree with most of the resident's note, physical exam, and plan except as below. Patient feels better. Still some slurring. No other complaints. Denies CP, SOB, AP. Remainder ROS unremarkable.  No events on tele.   Gen: NAD, AA0x2+, +mild-mod slurring  CV: nl S1 S2  Resp: decreased BS b/l  GI: NT/ND/S +BS  MS: no c/c/e  Neuro: nonfocal    #Acute CVA/Afib/CAD/NAVI  -as per neuro, repeat CTH in 7-14days before deciding on A/c  -cont ASA for now, Lipitor  -cont PT    #Thrombocytiopenia  -monitor Plts  -d/c PPI    #Progress Note Handoff  Pending (specify):  Consults___Clinical improvement and stability___x____, PT____x____  Pt/Family discussion: Pt informed and agrees with the current plan  Disposition: Home_____SNF___x____To be determined________

## 2020-06-10 NOTE — PROGRESS NOTE ADULT - ASSESSMENT
A 89 year old right handed gentleman with a past medical history of CAD s/p CABG, Parkinson's disease who presents with sudden expressive aphasia. Brain MRI reports of left temporal, left insular cortex and small focus in the left parietal lobe acute infarction. CTA H/N reports of no LVO but with significant scattered calcific plaque with mild to moderate stenoses of carotid siphons and left VA.   Of note, he is found to have found to have new onset Afib/flutter. Etiology of his stroke may be more likely related to vessel to vessel disease, thromboembolic than cardioembolic source.     SUGGESTIONS:  - Routine neuro checks  - Adjust antiplatelet therapy to  mg QD and stop Plavix 75 mg d/t thrombocytopenia   - C/w high dose statin therapy   - Follow up in Stroke clinic within 2 weeks. Consider repeat CT head prior to starting therapeutic oral AC after 7 to 10 days of stroke symptom onset.   - Avoid dehydration, hypotension, hypovolemia.   - Encourage oral hydration  - Maximize medical management, and adjust risk/lifestyle modification   - Medical management per primary team     Updated plan with primary team   Case discussed and patient seen with attending physician   Robyn Ramirez NP  [id number] A 89 year old right handed gentleman with a past medical history of CAD s/p CABG, Parkinson's disease who presents with sudden expressive aphasia. Brain MRI reports of left temporal, left insular cortex and small focus in the left parietal lobe acute infarction. CTA H/N reports of no LVO but with significant scattered calcific plaque with mild to moderate stenoses of carotid siphons and left VA.   Of note, he is found to have found to have new onset Afib/flutter. Etiology of his stroke may be more likely related to vessel to vessel disease, thromboembolic than cardioembolic source.     SUGGESTIONS:  - Routine neuro checks  - Adjust antiplatelet therapy to  mg QD and stop Plavix 75 mg d/t thrombocytopenia   - C/w high dose statin therapy   - Follow up in Stroke clinic within 2 weeks. Consider repeat CT head prior to starting therapeutic oral AC after 7 to 10 days of stroke symptom onset.   - Avoid dehydration, hypotension, hypovolemia.   - Encourage oral hydration  - Maximize medical management, and adjust risk/lifestyle modification   - Medical management per primary team     Updated plan with primary team. Will sign off.   Case discussed and patient seen with attending physician   Robyn Ramirez NP  [id number]

## 2020-06-10 NOTE — SWALLOW BEDSIDE ASSESSMENT ADULT - SWALLOW EVAL: RECOMMENDED FEEDING/EATING TECHNIQUES
maintain upright posture during/after eating for 30 mins/oral hygiene
alternate food with liquid/maintain upright posture during/after eating for 30 mins/check mouth frequently for oral residue/pocketing
oral hygiene/maintain upright posture during/after eating for 30 mins

## 2020-06-10 NOTE — PROGRESS NOTE ADULT - ATTENDING COMMENTS
A 89 year old right handed gentleman with a past medical history of CAD s/p CABG, Parkinson's disease who presents with sudden expressive aphasia. His initial NIHSS was two and on 6/10/20 became one. His Brain MRI shows patchy DWI cortical hyperintensity in the left temporal, left insular cortex and small focus in the left parietal lobe suggestive of acute embolic infarction. Her CTA H/N shows significant and extensive atherosclerotic plaques in the CCA bifurcation and b/l cavernous segment, especially the left side. Of note, he is found to have found to have new onset Afib/flutter. Given the above findings the etiology of his stroke seems to be more likely related to vessel to vessel thromboembolic and the second D/DX is the cardioembolic. Therefore both need to be treated. However patient is thrombocytopenic and hematology input in regard to antiplatelet/anticoagulant therapy is helpful.    SUGGESTIONS:  - Adjust antiplatelet therapy to  mg QD and stop Plavix 75 mg d/t thrombocytopenia   - C/w high dose statin therapy   - Follow up in Stroke clinic within 2 weeks for consideration of switching antiplatelet to anticoagulant if appropriate then. Consider repeat CT head prior to presenting to the stroke clinic.   - Avoid dehydration, hypotension, hypovolemia.   - Encourage oral hydration  - Maximize medical management, and NV/CV risk factor & lifestyle modification per Primary team as discussed in the round  - All Medical management per primary team

## 2020-06-10 NOTE — SWALLOW BEDSIDE ASSESSMENT ADULT - SWALLOW EVAL: FUNCTIONAL LEVEL AT TIME OF EVAL
awake, alert, following directions, son at bedside assisted w/ giving directions in [place]
alert

## 2020-06-10 NOTE — PROGRESS NOTE ADULT - SUBJECTIVE AND OBJECTIVE BOX
Stroke Progress Note:    1. Chief Complaint: Aphasia    HPI: This is a 89 year old right handed gentleman with a past medical history of CAD s/p CABG, Parkinson's disease who presents to the ED with sudden aphasia. Stroke code called on arrival. NIHSS 9 for aphasia and inability to answer questions. CT head reports of acute intracranial abnormalities. He was out of the window for IV thrombolytics. MRA H/N were unremarkable. Brain MRI reports of left temporal, left insular cortex and small focus in the left parietal lobe acute infarction.     Interval History: Pt seen in 3E, Stroke unit resting quietly, no new neurological complaints reported. Per primary RN patient is with mild aphasia.     2. Relevant PMH:   Prior ischemic stroke/TIA[ ], Afib [ ], CAD [ ], HTN [ ], DLD [ ], DM [ ], PVD [ ], Obesity [ ],   Sedentary lifestyle [ ], CHF [ ], TEX [ ], Cancer Hx [ ].    3. Social History: Smoking [ ], Drug Use [ ], Alcohol Use [ ], Other [ ]    4. Possible Location of Stroke: Left temporal lobe, left insular cortex and small focus in the left parietal lobe consistent with an acute ischemic change.    5. Relevant Brain Tissue Imaging:  < from: MR Head No Cont (06.07.20 @ 09:17) >  IMPRESSION: Patchy foci of hyperintense signal intensity on the T2, FLAIR and diffusion-weighted images in the left temporal lobe, left insular cortex and small focus in the left parietal lobe consistent with an acute ischemic change.    < from: CT Brain Stroke Protocol (06.06.20 @ 19:24) >  IMPRESSION: No evidence for acute intracranial hemorrhage, midline shift, or mass effect.    6. Relevant Cerebrovascular Imaging:   < from: CT Angio Head w/ IV Cont (06.09.20 @ 14:44) >  Findings:     NECK:  The visualized aortic arch and great vessel origins demonstrate mild calcific plaque without significant stenosis.  The common, internal and external carotid arteries are patent bilaterally. There is calcific plaque of the carotid bifurcations without significant stenosis.  The vertebral arteries are patent. There is scattered calcific plaque of the left vertebral artery with scattered mild stenoses throughout the cervical segments and mild to moderate stenoses of the V4 segment.    HEAD:  There is extensive calcific plaque of the carotid siphons with likely mild to moderate stenoses bilaterally. The anterior and middle cerebral arteries are patent.     The basilar artery is patent. The posterior cerebral arteries are patent.    OTHER:  Cervical spine degenerative changes and osteopenia.  Right > left left pleural effusions. Post median sternotomy.    IMPRESSION:   1.  No evidence of acute large vessel occlusion.  2.  Scattered calcific plaque with mild to moderate stenoses of the carotid siphons and left vertebral artery.     < from: MR Angio Neck w/wo IV Cont (06.07.20 @ 09:19) >  IMPRESSION: Unremarkable MRA of the neck.    < from: MR Angio Head No Cont (06.07.20 @ 09:17) >  IMPRESSION: Unremarkable MRA of the brain.    7. Relevant blood tests:      COVID-19 PCR . (06.06.20 @ 20:44)    COVID-19 PCR: NotDetec: Methodology:    Lipid Profile (06.07.20 @ 05:26)    Total Cholesterol/HDL Ratio Measurement: 3.2 Ratio    Cholesterol, Serum: 165 mg/dL    Triglycerides, Serum: 50 mg/dL    HDL Cholesterol, Serum: 52: HDL Levels >/= 60 mg/dL are considered beneficial and a "negative" risk  factor.  Effective 08/15/2018: New reference range and interpretive comment. mg/dL    Direct LDL: 106: LDL Cholesterol (mg/dL) --- Interpretive Comment (for adults 18 and over)    A1C with Estimated Average Glucose (06.07.20 @ 05:26)    A1C with Estimated Average Glucose Result: 5.8: Method: Immunoassay    8. Relevant cardiac rhythm monitoring: New onset Afib flutter  < from: 12 Lead ECG (06.09.20 @ 16:13) >  Ventricular Rate 65 BPM  Atrial Rate 260 BPM  QRS Duration 146 ms  Q-T Interval 468 ms  QTC Calculation(Bezet) 486 ms  P Axis 123 degrees  R Axis -1 degrees  T Axis 116 degrees  Diagnosis Line Atrial flutter with variable A-V block  Left bundle branch block  Abnormal ECG    9. Relevant Cardiac Structure: (TTE/MARIAH +/-):[ ]No intracardiac thrombus/[ ] no vegetation/[ ]no [place]/EF:  < from: Transthoracic Echocardiogram (06.08.20 @ 11:13) >    Summary:   1. LV Ejection Fraction by Snowden's Method with a biplane EF of 53 %.   2. Normal left ventricular size and wall thicknesses, with normal systolic and diastolic function.   3. The left ventricular diastolic function could not be assessed in this study.   4. Degenerative mitral valve.   5. Mild mitral valve regurgitation.   6. Moderate thickening and calcification of the anterior and posterior mitral valve leaflets.   7. Severe mitral annular calcification.   8. Sclerotic aortic valve with normal opening.   9. Estimated pulmonary artery systolic pressure is 45.2 mmHg assuming a right atrial pressure of 3 mmHg, which is consistent with mild pulmonary hypertension.  10. Pulmonary hypertension is present.  11. Color flow doppler and intravenous injection of agitated saline demonstrates the presence of an intact intra atrial septum.  12. LA volume Index is 29.2 ml/m² ml/m2.    Home Medications:  Amitiza 24 mcg oral capsule: 1 cap(s) orally once a day (06 Jun 2020 22:09)  aspirin 81 mg oral tablet: 1 tab(s) orally once a day (06 Jun 2020 22:09)  carbidopa-levodopa 25 mg-100 mg oral tablet: 1 tab(s) orally 3 times a day (06 Jun 2020 22:09)  Creon 36,000 units oral delayed release capsule: 1 cap(s) orally once a day with breakfast (06 Jun 2020 22:09)  Dexilant 60 mg oral delayed release capsule: 1 cap(s) orally once a day (06 Jun 2020 22:09)  dicyclomine 10 mg oral capsule: 1 cap(s) orally once a day (06 Jun 2020 22:09)  ferrous sulfate 325 mg (65 mg elemental iron) oral tablet: 1 tab(s) orally 3 times a day (06 Jun 2020 22:09)  finasteride 5 mg oral tablet: 1 tab(s) orally once a day (06 Jun 2020 22:09)  folic acid 1 mg oral tablet: 1 tab(s) orally once a day (06 Jun 2020 22:09)  furosemide 20 mg oral tablet: 1 tab(s) orally once a day, As Needed (06 Jun 2020 22:09)  gabapentin 300 mg oral capsule: 1 cap(s) orally once a day (06 Jun 2020 22:09)  Janumet 50 mg-500 mg oral tablet: 0.5 tab(s) orally once a day, As Needed (06 Jun 2020 22:09)  memantine 14 mg oral capsule, extended release: 1 cap(s) orally once a day (06 Jun 2020 22:09)  Metoprolol Succinate ER 25 mg oral tablet, extended release: 1 tab(s) orally once a day (06 Jun 2020 22:09)  raNITIdine 300 mg oral tablet: 1 tab(s) orally once a day (at bedtime) (06 Jun 2020 22:09)  rosuvastatin 20 mg oral tablet: 1 tab(s) orally once a day (06 Jun 2020 22:09)  tamsulosin 0.4 mg oral capsule: 1 cap(s) orally once a day (06 Jun 2020 22:09)    MEDICATIONS  (STANDING):  aspirin 162 milliGRAM(s) Oral daily  atorvastatin 80 milliGRAM(s) Oral at bedtime  carbidopa/levodopa  25/100 1 Tablet(s) Oral three times a day  dicyclomine 10 milliGRAM(s) Oral three times a day before meals  ferrous    sulfate 325 milliGRAM(s) Oral daily  finasteride 5 milliGRAM(s) Oral daily  folic acid 1 milliGRAM(s) Oral daily  heparin   Injectable 5000 Unit(s) SubCutaneous every 8 hours  memantine 10 milliGRAM(s) Oral daily  metoprolol succinate ER 25 milliGRAM(s) Oral daily  pancrelipase  (CREON 12,000 Lipase Units) 3 Capsule(s) Oral with breakfast  pantoprazole    Tablet 40 milliGRAM(s) Oral before breakfast  tamsulosin 0.4 milliGRAM(s) Oral at bedtime    10. PT/OT/Speech/Rehab/S&Sw/ Cognitive eval results and recommendations: Completed    11. Exam:    Vital Signs Last 24 Hrs  T(C): 36.6 (06-10-20 @ 13:50), Max: 36.6 (06-10-20 @ 13:50)  T(F): 97.8 (06-10-20 @ 13:50), Max: 97.8 (06-10-20 @ 13:50)  HR: 75 (06-10-20 @ 13:50) (64 - 102)  BP: 194/87 (06-10-20 @ 13:50) (111/63 - 194/87)  BP(mean): --  RR: 18 (06-10-20 @ 13:50) (18 - 18)  SpO2: 97% (06-09-20 @ 21:10) (96% - 97%)    12. Neurologic Exam:  Mental status: Awake, alert and oriented x 3. Attention and concentration intact. Fund of knowledge appropriate.  Language: Follows all commands. Naming, repetition, fluency, and comprehension intact. No dysarthria, mild aphasia.  Cranial nerves: B/l pupils equally round and reactive to light, visual fields full, no nystagmus, extraocular muscles intact, V1 through V3 intact bilaterally and symmetric, face symmetric  Motor: Maintains all extremities against gravity. Normal bulk and tone, strength 5/5 in bilateral upper and lower extremities.   strength 5/5.    Sensation: Intact to light touch, proprioception, and pinprick.  No neglect.   Coordination: No dysmetria on finger-to-nose and heel-to-shin.     NIH STROKE SCALE  Item	                                                        Score  1 a.	Level of Consciousness	               	0  1 b. LOC Questions	                                    0  1 c.	LOC Commands	                               	0  2.	Best Gaze	                                    0  3.	Visual	                                                0  4.	Facial Palsy	                                    0  5 a.	Motor Arm - Left	                        0  5 b.	Motor Arm - Right	                        0  6 a.	Motor Leg - Left	                        0  6 b.	Motor Leg - Right	                        0  7.	Limb Ataxia	                                    0  8.	Sensory	                                                0  9.	Language	                                    1  10.	Dysarthria	                                    0  11.	Extinction and Inattention  	            0  _____________________________________________________________________  TOTAL	                                                           1  ([place] citizen  services used with RN)  NIHSS Yesterday: 2          NIHSS Today: 1    mRS: 2

## 2020-06-10 NOTE — SWALLOW BEDSIDE ASSESSMENT ADULT - SLP PERTINENT HISTORY OF CURRENT PROBLEM
89 y.o M brought in for AMS,  c/o generalized weakness x 2 days, difficulty speaking, unresponsive on toilet, daughter observed L facial droop. Severe diarrhea. CTH negative. no TPA ,  MRI - small focus of T2 hyperintense signal in L cerebellar hemisphere likely represents ischemic change. CBC showed mild leukocytosis and thrombocytopenia.  PMhx: CAD s/p CABG, Parkinson's disease , Iron deficiency , folic acid deficiency . benign prostatic hyperplasia.

## 2020-06-11 ENCOUNTER — TRANSCRIPTION ENCOUNTER (OUTPATIENT)
Age: 85
End: 2020-06-11

## 2020-06-11 LAB
ANION GAP SERPL CALC-SCNC: 12 MMOL/L — SIGNIFICANT CHANGE UP (ref 7–14)
BASOPHILS # BLD AUTO: 0.03 K/UL — SIGNIFICANT CHANGE UP (ref 0–0.2)
BASOPHILS NFR BLD AUTO: 0.4 % — SIGNIFICANT CHANGE UP (ref 0–1)
BUN SERPL-MCNC: 21 MG/DL — HIGH (ref 10–20)
CALCIUM SERPL-MCNC: 9 MG/DL — SIGNIFICANT CHANGE UP (ref 8.5–10.1)
CHLORIDE SERPL-SCNC: 100 MMOL/L — SIGNIFICANT CHANGE UP (ref 98–110)
CO2 SERPL-SCNC: 26 MMOL/L — SIGNIFICANT CHANGE UP (ref 17–32)
CREAT SERPL-MCNC: 1.3 MG/DL — SIGNIFICANT CHANGE UP (ref 0.7–1.5)
EOSINOPHIL # BLD AUTO: 0.07 K/UL — SIGNIFICANT CHANGE UP (ref 0–0.7)
EOSINOPHIL NFR BLD AUTO: 0.8 % — SIGNIFICANT CHANGE UP (ref 0–8)
GLUCOSE SERPL-MCNC: 104 MG/DL — HIGH (ref 70–99)
HCT VFR BLD CALC: 34 % — LOW (ref 42–52)
HGB BLD-MCNC: 11.2 G/DL — LOW (ref 14–18)
IMM GRANULOCYTES NFR BLD AUTO: 0.4 % — HIGH (ref 0.1–0.3)
LYMPHOCYTES # BLD AUTO: 2.52 K/UL — SIGNIFICANT CHANGE UP (ref 1.2–3.4)
LYMPHOCYTES # BLD AUTO: 29.8 % — SIGNIFICANT CHANGE UP (ref 20.5–51.1)
MAGNESIUM SERPL-MCNC: 2 MG/DL — SIGNIFICANT CHANGE UP (ref 1.8–2.4)
MCHC RBC-ENTMCNC: 30.9 PG — SIGNIFICANT CHANGE UP (ref 27–31)
MCHC RBC-ENTMCNC: 32.9 G/DL — SIGNIFICANT CHANGE UP (ref 32–37)
MCV RBC AUTO: 93.7 FL — SIGNIFICANT CHANGE UP (ref 80–94)
MONOCYTES # BLD AUTO: 0.65 K/UL — HIGH (ref 0.1–0.6)
MONOCYTES NFR BLD AUTO: 7.7 % — SIGNIFICANT CHANGE UP (ref 1.7–9.3)
NEUTROPHILS # BLD AUTO: 5.16 K/UL — SIGNIFICANT CHANGE UP (ref 1.4–6.5)
NEUTROPHILS NFR BLD AUTO: 60.9 % — SIGNIFICANT CHANGE UP (ref 42.2–75.2)
NRBC # BLD: 0 /100 WBCS — SIGNIFICANT CHANGE UP (ref 0–0)
PLATELET # BLD AUTO: 99 K/UL — LOW (ref 130–400)
POTASSIUM SERPL-MCNC: 4 MMOL/L — SIGNIFICANT CHANGE UP (ref 3.5–5)
POTASSIUM SERPL-SCNC: 4 MMOL/L — SIGNIFICANT CHANGE UP (ref 3.5–5)
RBC # BLD: 3.63 M/UL — LOW (ref 4.7–6.1)
RBC # FLD: 13.9 % — SIGNIFICANT CHANGE UP (ref 11.5–14.5)
SARS-COV-2 RNA SPEC QL NAA+PROBE: SIGNIFICANT CHANGE UP
SODIUM SERPL-SCNC: 138 MMOL/L — SIGNIFICANT CHANGE UP (ref 135–146)
WBC # BLD: 8.46 K/UL — SIGNIFICANT CHANGE UP (ref 4.8–10.8)
WBC # FLD AUTO: 8.46 K/UL — SIGNIFICANT CHANGE UP (ref 4.8–10.8)

## 2020-06-11 PROCEDURE — 99232 SBSQ HOSP IP/OBS MODERATE 35: CPT

## 2020-06-11 RX ORDER — GABAPENTIN 400 MG/1
1 CAPSULE ORAL
Qty: 0 | Refills: 0 | DISCHARGE

## 2020-06-11 RX ORDER — ROSUVASTATIN CALCIUM 5 MG/1
1 TABLET ORAL
Qty: 0 | Refills: 0 | DISCHARGE

## 2020-06-11 RX ORDER — SENNA PLUS 8.6 MG/1
1 TABLET ORAL ONCE
Refills: 0 | Status: COMPLETED | OUTPATIENT
Start: 2020-06-11 | End: 2020-06-11

## 2020-06-11 RX ORDER — CLOPIDOGREL BISULFATE 75 MG/1
1 TABLET, FILM COATED ORAL
Qty: 30 | Refills: 0
Start: 2020-06-11

## 2020-06-11 RX ORDER — ASPIRIN/CALCIUM CARB/MAGNESIUM 324 MG
1 TABLET ORAL
Qty: 0 | Refills: 0 | DISCHARGE
Start: 2020-06-11

## 2020-06-11 RX ORDER — ASPIRIN/CALCIUM CARB/MAGNESIUM 324 MG
1 TABLET ORAL
Qty: 30 | Refills: 0
Start: 2020-06-11 | End: 2020-07-10

## 2020-06-11 RX ORDER — DEXLANSOPRAZOLE 30 MG/1
1 CAPSULE, DELAYED RELEASE ORAL
Qty: 0 | Refills: 0 | DISCHARGE

## 2020-06-11 RX ORDER — ASPIRIN/CALCIUM CARB/MAGNESIUM 324 MG
1 TABLET ORAL
Qty: 0 | Refills: 0 | DISCHARGE

## 2020-06-11 RX ORDER — ATORVASTATIN CALCIUM 80 MG/1
1 TABLET, FILM COATED ORAL
Qty: 0 | Refills: 0 | DISCHARGE
Start: 2020-06-11

## 2020-06-11 RX ADMIN — Medication 3 CAPSULE(S): at 08:42

## 2020-06-11 RX ADMIN — HEPARIN SODIUM 5000 UNIT(S): 5000 INJECTION INTRAVENOUS; SUBCUTANEOUS at 06:13

## 2020-06-11 RX ADMIN — Medication 162 MILLIGRAM(S): at 11:39

## 2020-06-11 RX ADMIN — CARBIDOPA AND LEVODOPA 1 TABLET(S): 25; 100 TABLET ORAL at 21:14

## 2020-06-11 RX ADMIN — ATORVASTATIN CALCIUM 80 MILLIGRAM(S): 80 TABLET, FILM COATED ORAL at 21:14

## 2020-06-11 RX ADMIN — CARBIDOPA AND LEVODOPA 1 TABLET(S): 25; 100 TABLET ORAL at 13:12

## 2020-06-11 RX ADMIN — Medication 10 MILLIGRAM(S): at 11:39

## 2020-06-11 RX ADMIN — FINASTERIDE 5 MILLIGRAM(S): 5 TABLET, FILM COATED ORAL at 11:39

## 2020-06-11 RX ADMIN — SENNA PLUS 1 TABLET(S): 8.6 TABLET ORAL at 22:51

## 2020-06-11 RX ADMIN — HEPARIN SODIUM 5000 UNIT(S): 5000 INJECTION INTRAVENOUS; SUBCUTANEOUS at 13:12

## 2020-06-11 RX ADMIN — Medication 10 MILLIGRAM(S): at 06:13

## 2020-06-11 RX ADMIN — CARBIDOPA AND LEVODOPA 1 TABLET(S): 25; 100 TABLET ORAL at 06:13

## 2020-06-11 RX ADMIN — HEPARIN SODIUM 5000 UNIT(S): 5000 INJECTION INTRAVENOUS; SUBCUTANEOUS at 21:14

## 2020-06-11 RX ADMIN — Medication 325 MILLIGRAM(S): at 11:39

## 2020-06-11 RX ADMIN — Medication 1 MILLIGRAM(S): at 11:39

## 2020-06-11 RX ADMIN — Medication 10 MILLIGRAM(S): at 17:27

## 2020-06-11 RX ADMIN — TAMSULOSIN HYDROCHLORIDE 0.4 MILLIGRAM(S): 0.4 CAPSULE ORAL at 21:14

## 2020-06-11 RX ADMIN — MEMANTINE HYDROCHLORIDE 10 MILLIGRAM(S): 10 TABLET ORAL at 11:39

## 2020-06-11 RX ADMIN — Medication 25 MILLIGRAM(S): at 06:13

## 2020-06-11 NOTE — DISCHARGE NOTE PROVIDER - NSDCQMSTROKERISK_NEU_ALL_CORE
History of a stroke or TIA/Atrial fibrillation/Carotid stenosis History of a stroke or TIA/Atrial fibrillation/Blood clotting problems/Carotid stenosis/High cholesterol

## 2020-06-11 NOTE — DISCHARGE NOTE PROVIDER - PROVIDER TOKENS
PROVIDER:[TOKEN:[[id number]],FOLLOWUP:[2 weeks]],PROVIDER:[TOKEN:[[id number]],FOLLOWUP:[2 weeks]],FREE:[LAST:[Kristel],FIRST:[Tanja],PHONE:[phone]:[(   )    -],ADDRESS:[[address] [place] [address]],FOLLOWUP:[1 week]] PROVIDER:[TOKEN:[[id number]],FOLLOWUP:[1 week]],PROVIDER:[TOKEN:[[id number]],FOLLOWUP:[2 weeks]],FREE:[LAST:[Kristel],FIRST:[Tanja],PHONE:[phone]:[(   )    -],ADDRESS:[[address] [place] [address]],FOLLOWUP:[1 week]]

## 2020-06-11 NOTE — DISCHARGE NOTE PROVIDER - CARE PROVIDER_API CALL
Ovidio Huizar  EEG/EPILEPSY  [address] [facility] [address]  [place] [address]  Phone: [phone]  Fax: [phone]  Follow Up Time: 2 weeks    Sergo Gonzalez  CARDIOVASCULAR DISEASE  [address] [place] [address]  Phone: [phone]  Fax: [phone]  Follow Up Time: 2 weeks    Tanja Humphries  [address] [place] [address]  Phone: [phone]  Fax: (   )    -  Follow Up Time: 1 week Ovidio Huizar  EEG/EPILEPSY  [address] [facility] [address]  [place] [address]  Phone: [phone]  Fax: [phone]  Follow Up Time: 1 week    Sergo Gonzalez  CARDIOVASCULAR DISEASE  [address] [place] [address]  Phone: [phone]  Fax: [phone]  Follow Up Time: 2 weeks    Tanja Humphries  [address] [place] [address]  Phone: [phone]  Fax: (   )    -  Follow Up Time: 1 week

## 2020-06-11 NOTE — SWALLOW BEDSIDE ASSESSMENT ADULT - SWALLOW EVAL: RECOMMENDED DIET
dysphagia 2 ground and thin
dysphagia 2 ground and thin
Dysphagia 2 soft ground consistency w/ thin liquids
dysphagia 2 ground and thin

## 2020-06-11 NOTE — SWALLOW BEDSIDE ASSESSMENT ADULT - DIET PRIOR TO ADMI
dysphagia 2 ground and thin
dysphagia2 ground and thin
ground w/ thin liquids per granddaughter
dysphagia 2 ground and thin

## 2020-06-11 NOTE — PROGRESS NOTE ADULT - SUBJECTIVE AND OBJECTIVE BOX
Hospital Day:  5d    Subjective: Patient is a 89y old  Male who presents with a chief complaint of Altered Mental Status (11 Jun 2020 14:18)    Pt seen and evaluated at bedside. No over the night acute events reported.   Pt clinically improved. Speak with slight slurr. walked with physical therapy.     Past Medical Hx:   Coronary artery disease  Parkinson disease    Past Sx:  S/P CABG (coronary artery bypass graft)    Allergies:  No Known Allergies    Current Meds:   Stand Meds:  aspirin 162 milliGRAM(s) Oral daily  atorvastatin 80 milliGRAM(s) Oral at bedtime  carbidopa/levodopa  25/100 1 Tablet(s) Oral three times a day  dicyclomine 10 milliGRAM(s) Oral three times a day before meals  ferrous    sulfate 325 milliGRAM(s) Oral daily  finasteride 5 milliGRAM(s) Oral daily  folic acid 1 milliGRAM(s) Oral daily  heparin   Injectable 5000 Unit(s) SubCutaneous every 8 hours  memantine 10 milliGRAM(s) Oral daily  metoprolol succinate ER 25 milliGRAM(s) Oral daily  pancrelipase  (CREON 12,000 Lipase Units) 3 Capsule(s) Oral with breakfast  tamsulosin 0.4 milliGRAM(s) Oral at bedtime    PRN Meds:    HOME MEDICATIONS:  Amitiza 24 mcg oral capsule: 1 cap(s) orally once a day  atorvastatin 80 mg oral tablet: 1 tab(s) orally once a day (at bedtime)  carbidopa-levodopa 25 mg-100 mg oral tablet: 1 tab(s) orally 3 times a day  Creon 36,000 units oral delayed release capsule: 1 cap(s) orally once a day with breakfast  dicyclomine 10 mg oral capsule: 1 cap(s) orally once a day, As Needed  ferrous sulfate 325 mg (65 mg elemental iron) oral tablet: 1 tab(s) orally 3 times a day  finasteride 5 mg oral tablet: 1 tab(s) orally once a day  folic acid 1 mg oral tablet: 1 tab(s) orally once a day  furosemide 20 mg oral tablet: 1 tab(s) orally once a day, As Needed  Janumet 50 mg-500 mg oral tablet: 0.5 tab(s) orally once a day, As Needed  memantine 14 mg oral capsule, extended release: 1 cap(s) orally once a day  Metoprolol Succinate ER 25 mg oral tablet, extended release: 1 tab(s) orally once a day  raNITIdine 300 mg oral tablet: 1 tab(s) orally once a day (at bedtime)  tamsulosin 0.4 mg oral capsule: 1 cap(s) orally once a day      Vital Signs:   T(F): 97.3 (06-11-20 @ 13:59), Max: 98.2 (06-10-20 @ 21:34)  HR: 69 (06-11-20 @ 13:59) (69 - 104)  BP: 111/65 (06-11-20 @ 13:59) (111/65 - 164/114)  RR: 18 (06-11-20 @ 13:59) (18 - 18)  SpO2: --      06-10-20 @ 07:01  -  06-11-20 @ 07:00  --------------------------------------------------------  IN: 0 mL / OUT: 300 mL / NET: -300 mL        Physical Exam:   GENERAL: NAD, resting in bed  HEENT: NCAT, slight slurring of speech  CHEST/LUNG: CTAB  HEART: Regular rate and rhythm; s1 s2 appreciated, No murmurs, rubs, or gallops  ABDOMEN: Soft, Nontender, Nondistended; Bowel sounds present  EXTREMITIES: No LE edema b/l, motor and sensory intact throughout.   SKIN: no rashes, no new lesions  NERVOUS SYSTEM:  Alert & Oriented X3    Labs:                         11.2   8.46  )-----------( 99       ( 11 Jun 2020 05:21 )             34.0     Neutophil% 60.9, Lymphocyte% 29.8, Monocyte% 7.7, Bands% 0.4 06-11-20 @ 05:21    11 Jun 2020 05:21    138    |  100    |  21     ----------------------------<  104    4.0     |  26     |  1.3      Ca    9.0        11 Jun 2020 05:21  Mg     2.0       11 Jun 2020 05:21    Culture - Blood (collected 06-07-20 @ 00:36)  Source: .Blood None  Preliminary Report (06-08-20 @ 07:01):    No growth to date.    Radiology:       Assessment and Plan:   89 y/o male w/ pMHx of CAD s/p CABG, Parkinsons, anemia, BPH was brought in by EMS for aphasia.     # Acute Ischemic Stroke with aphasia  - likely secondary to vessel to vessel thromboembolic cause, less likely from a-fib  - CT Head negative on admission, NIHSS 9; Not a tPA (outside window)' Not an IA candidate (m-RS > 2)  - MRI showed acute ischemic stroke (L-Temporal, L-Insular Cortex; L-Parietal focus)  - CTA H+N: scattered calcific plaques with mild to mod stenosis of carotid siphons and lt vertebral artery.   - TTE w/ Bubble unremarkable  - Lipid panel, TSH, and A1c noted  - NI: ASA 162mg qd, Atorvastatin 80mg QHS, and d/c plavix  - Holding Gabapentin given change in mentation; No current issues since held  - Aspiration, Seizure and Fall precautions  - Dysphagia 2 diet as per S+S  - PT / OT    # Paroxysmal Atrial fibrillation - new onset  - Full AC not initiated during workup for ischemic stroke;   - f/u neurology and cardiology rec o/p regarding AC.  - high risk of fall and low plt makes him high risk for bleeding.   - Will continue home Metoprolol 25mg QD; Currently rate controlled    # Elevated Troponin T; Likely chronic  - Likely due to CKD; CKMB was elevated x1 with no events of EKG changes  - Stable and no need to trend further    # NAVI - resolved  - Creatinine 1.6 on admission; Now improved to 1.2   - Avoid hypotension and nephrotoxic medication     # Anemia and Thrombocytopenia; Hx of Multifactorial anemia  - Continue home Ferrous sulfate and Folic acid  - B12/folate/IRon studies wnl    # Hx of Parkinson's disease: Continue Carbidopa/Levodopa 25/100 TID + Memantine QD  # Hx of CAD s/p CABG: on ASA and Statin   # Hx of Benign prostatic hyperplasia: Continue Flomax 0.4mg QHS + Finasteride 5mg QD  # Hx of GERD: Continue Protonix; Takes Ranitidine and Dexilant at home    GI ppx:   Protonix  DVT ppx:   Heparin SubQ  Activity:   As Tolerated   CODE STATUS:   FULL

## 2020-06-11 NOTE — SWALLOW BEDSIDE ASSESSMENT ADULT - NS SPL SWALLOW CLINIC TRIAL FT
tolerated - baseline diet at home
tolerated
+toleration with no overt s/s of aspiration vs penetration
Pt presents w/ functional oropharyngeal swallow for puree & thin liquids

## 2020-06-11 NOTE — SWALLOW BEDSIDE ASSESSMENT ADULT - PHARYNGEAL PHASE
Within functional limits
Within functional limits
no overt symptoms of penetration/aspiration noted at bedside
Within functional limits

## 2020-06-11 NOTE — SWALLOW BEDSIDE ASSESSMENT ADULT - SWALLOW EVAL: DIAGNOSIS
Pt p/w functional swallow for his baseline diet. + tolerated dysphagia 2 ground and thin liquids via cup sip. Mild oral dysphagia for ground textures. Rec crush meds and give with applesauce.
functional oropharyngeal swallow at bedside for dysphagia 2 ground and thin liquids via cup sip. No overt s/s of aspiration/penetration noted.
mod oral dysphagia for regular consistency, tolerating soft consistency w/ mild oral dysphagia, tolerating thin liquids
+toleration of thins, Dysphagia diet I puree consistency, mild oral dysphagia for Dysphagia Diet II mechanical soft consistency with ground meat with no overt s/s of aspiration vs penetration.

## 2020-06-11 NOTE — SWALLOW BEDSIDE ASSESSMENT ADULT - SLP GENERAL OBSERVATIONS
Pt awake in bed o2 via RA.
Pt asleep in bed, o2 via RA, arousable to verbal stimuli
awake, alert, following directions, son at bedside assisted w/ giving directions in [place]
on RA

## 2020-06-11 NOTE — DISCHARGE NOTE PROVIDER - NSDCMRMEDTOKEN_GEN_ALL_CORE_FT
Amitiza 24 mcg oral capsule: 1 cap(s) orally once a day  aspirin 162.5 mg oral capsule, extended release: 1 cap(s) orally once a day   aspirin 81 mg oral tablet: 1 tab(s) orally once a day  atorvastatin 80 mg oral tablet: 1 tab(s) orally once a day (at bedtime)  carbidopa-levodopa 25 mg-100 mg oral tablet: 1 tab(s) orally 3 times a day  Creon 36,000 units oral delayed release capsule: 1 cap(s) orally once a day with breakfast  dicyclomine 10 mg oral capsule: 1 cap(s) orally once a day  ferrous sulfate 325 mg (65 mg elemental iron) oral tablet: 1 tab(s) orally 3 times a day  finasteride 5 mg oral tablet: 1 tab(s) orally once a day  folic acid 1 mg oral tablet: 1 tab(s) orally once a day  furosemide 20 mg oral tablet: 1 tab(s) orally once a day, As Needed  gabapentin 300 mg oral capsule: 1 cap(s) orally once a day  Janumet 50 mg-500 mg oral tablet: 0.5 tab(s) orally once a day, As Needed  memantine 14 mg oral capsule, extended release: 1 cap(s) orally once a day  Metoprolol Succinate ER 25 mg oral tablet, extended release: 1 tab(s) orally once a day  raNITIdine 300 mg oral tablet: 1 tab(s) orally once a day (at bedtime)  tamsulosin 0.4 mg oral capsule: 1 cap(s) orally once a day Amitiza 24 mcg oral capsule: 1 cap(s) orally once a day  aspirin 162.5 mg oral capsule, extended release: 1 cap(s) orally once a day   atorvastatin 80 mg oral tablet: 1 tab(s) orally once a day (at bedtime)  carbidopa-levodopa 25 mg-100 mg oral tablet: 1 tab(s) orally 3 times a day  Creon 36,000 units oral delayed release capsule: 1 cap(s) orally once a day with breakfast  dicyclomine 10 mg oral capsule: 1 cap(s) orally once a day, As Needed  ferrous sulfate 325 mg (65 mg elemental iron) oral tablet: 1 tab(s) orally 3 times a day  finasteride 5 mg oral tablet: 1 tab(s) orally once a day  folic acid 1 mg oral tablet: 1 tab(s) orally once a day  furosemide 20 mg oral tablet: 1 tab(s) orally once a day, As Needed  Janumet 50 mg-500 mg oral tablet: 0.5 tab(s) orally once a day, As Needed  memantine 14 mg oral capsule, extended release: 1 cap(s) orally once a day  Metoprolol Succinate ER 25 mg oral tablet, extended release: 1 tab(s) orally once a day  raNITIdine 300 mg oral tablet: 1 tab(s) orally once a day (at bedtime)  tamsulosin 0.4 mg oral capsule: 1 cap(s) orally once a day Amitiza 24 mcg oral capsule: 1 cap(s) orally once a day  aspirin 81 mg oral tablet: 1 tab(s) orally once a day  atorvastatin 80 mg oral tablet: 1 tab(s) orally once a day (at bedtime)  carbidopa-levodopa 25 mg-100 mg oral tablet: 1 tab(s) orally 3 times a day  Creon 36,000 units oral delayed release capsule: 1 cap(s) orally once a day with breakfast  dicyclomine 10 mg oral capsule: 1 cap(s) orally once a day, As Needed  ferrous sulfate 325 mg (65 mg elemental iron) oral tablet: 1 tab(s) orally 3 times a day  finasteride 5 mg oral tablet: 1 tab(s) orally once a day  folic acid 1 mg oral tablet: 1 tab(s) orally once a day  furosemide 20 mg oral tablet: 1 tab(s) orally once a day, As Needed  Janumet 50 mg-500 mg oral tablet: 0.5 tab(s) orally once a day, As Needed  memantine 14 mg oral capsule, extended release: 1 cap(s) orally once a day  Metoprolol Succinate ER 25 mg oral tablet, extended release: 1 tab(s) orally once a day  Plavix 75 mg oral tablet: 1 tab(s) orally once a day   raNITIdine 300 mg oral tablet: 1 tab(s) orally once a day (at bedtime)  tamsulosin 0.4 mg oral capsule: 1 cap(s) orally once a day

## 2020-06-11 NOTE — SWALLOW BEDSIDE ASSESSMENT ADULT - SWALLOW EVAL: CURRENT DIET
dysphagia 2 ground and thin
dysphagia 2 ground and thin
full liquid diet
dysphagia 2 ground and thin

## 2020-06-11 NOTE — PROGRESS NOTE ADULT - REASON FOR ADMISSION
Altered Mental Status
Altered Mental Status; Stroke

## 2020-06-11 NOTE — DISCHARGE NOTE PROVIDER - HOSPITAL COURSE
89 year old male patient with past medical history of CAD s/p CABG, Parkinson's disease, Iron deficiency anemia, folic acid deficiency, benign prostatic hyperplasia, brought by EMS from home for altered mental status. The history was provided by the grand-daughter Flor as the patient is unable to speak. In the ED : /68,  / min, RR 18, SpO2 97 % on room air. Stroke code was called and the patient was found to have an NIHSS of 9 (unable to perform tasks, does not answer questions, global aphasia and severe dysarthria). CT Head no contrast was done and showed no evidence of intra-cranial hemorrhage. patient was not a tPA candidate because he was outside the time window and was not a candidate for IA intervention as his m-RS was 4. CBC showing mild leucocytosis and thrombocytopenia, BMP showing a creatinine of 1.6 (No baseline to compare). Chest x ray done and clear per my read (official report still pending). The patient was given Aspirin 325 mg and was admitted to the stroke unit for further workup. CTA h+n showed scattered calcific plaques with mild to mod stenosis of carotid siphons and left vertebral artery. TTE w/ bubble study unremarkable. MRI showed multiple acute strokes in left temporal, left insular corted and left parietal focus. Pt was also found to have new onset paroxysmal afib. rate controlled with BB. No ac started due to risk of hemorragic conversion. Pt to follow up with stroke clinic outpatient and they will decide. Pt is able to speak and walked with physical therapy. Pt is medically stable for discharge. 89 year old male patient with past medical history of CAD s/p CABG, Parkinson's disease, Iron deficiency anemia, folic acid deficiency, benign prostatic hyperplasia, brought by EMS from home for altered mental status. The history was provided by the grand-daughter Flor as the patient is unable to speak. In the ED : /68,  / min, RR 18, SpO2 97 % on room air. Stroke code was called and the patient was found to have an NIHSS of 9 (unable to perform tasks, does not answer questions, global aphasia and severe dysarthria). CT Head no contrast was done and showed no evidence of intra-cranial hemorrhage. patient was not a tPA candidate because he was outside the time window and was not a candidate for IA intervention as his m-RS was 4. CBC showing mild leucocytosis and thrombocytopenia, BMP showing a creatinine of 1.6 (No baseline to compare). Chest x ray done and clear per my read (official report still pending). The patient was given Aspirin 325 mg and was admitted to the stroke unit for further workup. CTA h+n showed scattered calcific plaques with mild to mod stenosis of carotid siphons and left vertebral artery. TTE w/ bubble study unremarkable. MRI showed multiple acute strokes in left temporal, left insular corted and left parietal focus. Pt was also found to have new onset paroxysmal afib. rate controlled with BB. No ac started for now due to risk of hemorrhagic conversion. Pt to follow up with stroke clinic outpatient and they will decide. Pt is able to speak and walked with physical therapy. Pt is medically stable for discharge. 89 year old male patient with past medical history of CAD s/p CABG, Parkinson's disease, Iron deficiency anemia, folic acid deficiency, benign prostatic hyperplasia, brought by EMS from home for altered mental status. The history was provided by the grand-daughter Flor as the patient is unable to speak. In the ED : /68,  / min, RR 18, SpO2 97 % on room air. Stroke code was called and the patient was found to have an NIHSS of 9 (unable to perform tasks, does not answer questions, global aphasia and severe dysarthria). CT Head no contrast was done and showed no evidence of intra-cranial hemorrhage. patient was not a tPA candidate because he was outside the time window and was not a candidate for IA intervention as his m-RS was 4. CBC showing mild leucocytosis and thrombocytopenia, BMP showing a creatinine of 1.6 (No baseline to compare). Chest x ray done and clear per my read (official report still pending). The patient was given Aspirin 325 mg and was admitted to the stroke unit for further workup. CTA h+n showed scattered calcific plaques with mild to mod stenosis of carotid siphons and left vertebral artery. TTE w/ bubble study unremarkable. MRI showed multiple acute strokes in left temporal, left insular corted and left parietal focus. Pt was also found to have new onset paroxysmal afib. rate controlled with BB. No ac started for now due to risk of hemorrhagic conversion. Pt to follow up with stroke clinic outpatient and they will decide. Pt is able to speak and walked with physical therapy. Pt is medically stable for discharge.         6/12: Pt seen and examined independently. No new complaints. Feeling better. D/w granddaughter plan and instructions.        Gen: NAD, AAOx3, mild-mod stuttering    CV: S1 S2    Resp: Decreased BS b/l    GI: NT/ND/S +BS    MS: neg c/c/e    Neuro: nonfocal        Discharge instructions discussed and patient/granddaughter know when to seek immediate medical attention.  Patient has proper follow up.  All results discussed and granddaughter aware they may require further work up.  Stressed importance of proper follow up.  Medications prescribed and changes discussed.  All questions and concerns from patient and family addressed. Understanding of instructions verbalized.        Time spent in completing discharge process and coordinating care 45 minutes.        Discussed with housestaff, nursing, social work, granddaughter

## 2020-06-11 NOTE — PROGRESS NOTE ADULT - ATTENDING COMMENTS
Patient seen and examined independently earlier today. Case discussed with housestaff, nursing, social work, PT. I agree with most of the resident's note, physical exam, and plan except as below. Patient feels better. Still some stuttering/slurring. No other complaints. Denies CP, SOB, AP. Remainder ROS unremarkable.  Ambulating with PT.  Gen: NAD, AA0x2+, +mild-mod slurring/stuttering  CV: nl S1 S2  Resp: decreased BS b/l  GI: NT/ND/S +BS  MS: no c/c/e  Neuro: nonfocal    #Acute CVA/Afib/CAD/NAVI  -as per neuro, repeat CTH in 7-14days before deciding on A/c  -cont ASA, Plavix (plts better), Lipitor  -cont PT  -outpt CTH before decision on starting A/c    #Thrombocytiopenia  -improved  -d/c PPI    #Progress Note Handoff  Pending (specify):  Consults___Clinical improvement and stability___x____, PT____x____Auth  Pt/Family discussion: Pt informed and agrees with the current plan  Disposition: Home_____SNF___x____To be determined________ .     35 minutes spent on total encounter; more than 50% of the visit was spent counseling and/or coordinating care by the attending physician.

## 2020-06-11 NOTE — DISCHARGE NOTE PROVIDER - NSDCHHBASESERVICE_GEN_ALL_CORE
Occupational therapy/Physical therapy Speech language pathology/Occupational therapy/Physical therapy

## 2020-06-11 NOTE — DISCHARGE NOTE PROVIDER - NSDCCPCAREPLAN_GEN_ALL_CORE_FT
PRINCIPAL DISCHARGE DIAGNOSIS  Diagnosis: Cerebrovascular accident (CVA)  Assessment and Plan of Treatment: Your presenting symptoms were because you were found to have a stroke. Your stroke has been managed medically. Please follow up with stroke clinic outpatient within 2 weeks of hospital discharge.      SECONDARY DISCHARGE DIAGNOSES  Diagnosis: Paroxysmal A-fib  Assessment and Plan of Treatment: You were found to have new onset of proxysmal a-fib. Your heart rate is controlled with a medication. You were not started on anticoagulation because of risk of having brain bleed. Please follow up with stroke clinic outpatient in 2 weeks and they will make the decision. PRINCIPAL DISCHARGE DIAGNOSIS  Diagnosis: Cerebrovascular accident (CVA)  Assessment and Plan of Treatment: Your presenting symptoms were because you were found to have a stroke. Your stroke has been managed medically. Plavix was added to your regimen and you were started on high dose of cholesterol lowering medication. Please follow up with stroke clinic outpatient within 1-2 weeks of hospital discharge. Please repeat CTH without contrast in 1week.      SECONDARY DISCHARGE DIAGNOSES  Diagnosis: Thrombocytopenia  Assessment and Plan of Treatment: repeat CBC in 1wk    Diagnosis: Paroxysmal A-fib  Assessment and Plan of Treatment: You were found to have new onset of proxysmal a-fib. Your heart rate is controlled with a medication. You were not started on anticoagulation because of risk of having brain bleed. Please follow up with stroke clinic outpatient in 1-2 weeks and they will make the decision.

## 2020-06-12 ENCOUNTER — TRANSCRIPTION ENCOUNTER (OUTPATIENT)
Age: 85
End: 2020-06-12

## 2020-06-12 VITALS
SYSTOLIC BLOOD PRESSURE: 113 MMHG | RESPIRATION RATE: 17 BRPM | HEART RATE: 72 BPM | DIASTOLIC BLOOD PRESSURE: 61 MMHG | TEMPERATURE: 98 F

## 2020-06-12 LAB
CULTURE RESULTS: SIGNIFICANT CHANGE UP
SPECIMEN SOURCE: SIGNIFICANT CHANGE UP

## 2020-06-12 PROCEDURE — 99239 HOSP IP/OBS DSCHRG MGMT >30: CPT

## 2020-06-12 RX ORDER — CLOPIDOGREL BISULFATE 75 MG/1
75 TABLET, FILM COATED ORAL DAILY
Refills: 0 | Status: DISCONTINUED | OUTPATIENT
Start: 2020-06-12 | End: 2020-06-12

## 2020-06-12 RX ORDER — POLYETHYLENE GLYCOL 3350 17 G/17G
17 POWDER, FOR SOLUTION ORAL ONCE
Refills: 0 | Status: COMPLETED | OUTPATIENT
Start: 2020-06-12 | End: 2020-06-12

## 2020-06-12 RX ORDER — ASPIRIN/CALCIUM CARB/MAGNESIUM 324 MG
81 TABLET ORAL DAILY
Refills: 0 | Status: DISCONTINUED | OUTPATIENT
Start: 2020-06-12 | End: 2020-06-12

## 2020-06-12 RX ORDER — LACTULOSE 10 G/15ML
10 SOLUTION ORAL EVERY 4 HOURS
Refills: 0 | Status: DISCONTINUED | OUTPATIENT
Start: 2020-06-12 | End: 2020-06-12

## 2020-06-12 RX ADMIN — MEMANTINE HYDROCHLORIDE 10 MILLIGRAM(S): 10 TABLET ORAL at 11:15

## 2020-06-12 RX ADMIN — Medication 1 MILLIGRAM(S): at 11:14

## 2020-06-12 RX ADMIN — Medication 81 MILLIGRAM(S): at 11:13

## 2020-06-12 RX ADMIN — Medication 10 MILLIGRAM(S): at 11:13

## 2020-06-12 RX ADMIN — LACTULOSE 10 GRAM(S): 10 SOLUTION ORAL at 09:41

## 2020-06-12 RX ADMIN — CLOPIDOGREL BISULFATE 75 MILLIGRAM(S): 75 TABLET, FILM COATED ORAL at 11:14

## 2020-06-12 RX ADMIN — Medication 10 MILLIGRAM(S): at 06:10

## 2020-06-12 RX ADMIN — CARBIDOPA AND LEVODOPA 1 TABLET(S): 25; 100 TABLET ORAL at 05:36

## 2020-06-12 RX ADMIN — HEPARIN SODIUM 5000 UNIT(S): 5000 INJECTION INTRAVENOUS; SUBCUTANEOUS at 05:36

## 2020-06-12 RX ADMIN — Medication 25 MILLIGRAM(S): at 05:36

## 2020-06-12 RX ADMIN — Medication 325 MILLIGRAM(S): at 11:13

## 2020-06-12 RX ADMIN — Medication 3 CAPSULE(S): at 09:41

## 2020-06-12 RX ADMIN — POLYETHYLENE GLYCOL 3350 17 GRAM(S): 17 POWDER, FOR SOLUTION ORAL at 09:41

## 2020-06-12 RX ADMIN — FINASTERIDE 5 MILLIGRAM(S): 5 TABLET, FILM COATED ORAL at 11:13

## 2020-06-12 NOTE — DISCHARGE NOTE NURSING/CASE MANAGEMENT/SOCIAL WORK - NSDCPEPTSTRK_GEN_ALL_CORE
Prescribed medications/Signs and symptoms of stroke/Need for follow up after discharge/Risk factors for stroke/Stroke education booklet/Call 911 for stroke/Stroke warning signs and symptoms/Stroke support groups for patients, families, and friends

## 2020-06-12 NOTE — DISCHARGE NOTE NURSING/CASE MANAGEMENT/SOCIAL WORK - PATIENT PORTAL LINK FT
You can access the FollowMyHealth Patient Portal offered by [facility] by registering at the following website: [email or web address]. By joining [facility]s FollowMyHealth portal, you will also be able to view your health information using other applications (apps) compatible with our system.

## 2020-06-12 NOTE — CHART NOTE - NSCHARTNOTEFT_GEN_A_CORE
<<<RESIDENT DISCHARGE NOTE>>>     ALEXY PURDY  MRN-[id number]    VITAL SIGNS:  T(F): 96.1 (06-12-20 @ 05:15), Max: 98 (06-11-20 @ 20:42)  HR: 84 (06-12-20 @ 05:15)  BP: 123/58 (06-12-20 @ 05:15)  SpO2: --        TEST RESULTS:                        11.2   8.46  )-----------( 99       ( 11 Jun 2020 05:21 )             34.0       06-11    138  |  100  |  21<H>  ----------------------------<  104<H>  4.0   |  26  |  1.3    Ca    9.0      11 Jun 2020 05:21  Mg     2.0     06-11    Pt seen and evaluated at bedside. No issues over the night.   medically clear for discharge.     FINAL DISCHARGE INTERVIEW:  Resident(s) Present: (Name: Yogi Salazar DO)    DISPOSITION:   [  ] Home,    [  ] Home with Visiting Nursing Services,   [  x  ]  SNF/ NH,    [   ] Acute Rehab (4A),   [   ] Other (Specify:_________)

## 2020-06-15 DIAGNOSIS — E53.8 DEFICIENCY OF OTHER SPECIFIED B GROUP VITAMINS: ICD-10-CM

## 2020-06-15 DIAGNOSIS — D64.9 ANEMIA, UNSPECIFIED: ICD-10-CM

## 2020-06-15 DIAGNOSIS — I27.20 PULMONARY HYPERTENSION, UNSPECIFIED: ICD-10-CM

## 2020-06-15 DIAGNOSIS — N18.3 CHRONIC KIDNEY DISEASE, STAGE 3 (MODERATE): ICD-10-CM

## 2020-06-15 DIAGNOSIS — R41.82 ALTERED MENTAL STATUS, UNSPECIFIED: ICD-10-CM

## 2020-06-15 DIAGNOSIS — R29.709 NIHSS SCORE 9: ICD-10-CM

## 2020-06-15 DIAGNOSIS — G20 PARKINSON'S DISEASE: ICD-10-CM

## 2020-06-15 DIAGNOSIS — Z95.1 PRESENCE OF AORTOCORONARY BYPASS GRAFT: ICD-10-CM

## 2020-06-15 DIAGNOSIS — I25.10 ATHEROSCLEROTIC HEART DISEASE OF NATIVE CORONARY ARTERY WITHOUT ANGINA PECTORIS: ICD-10-CM

## 2020-06-15 DIAGNOSIS — K21.9 GASTRO-ESOPHAGEAL REFLUX DISEASE WITHOUT ESOPHAGITIS: ICD-10-CM

## 2020-06-15 DIAGNOSIS — F02.80 DEMENTIA IN OTHER DISEASES CLASSIFIED ELSEWHERE, UNSPECIFIED SEVERITY, WITHOUT BEHAVIORAL DISTURBANCE, PSYCHOTIC DISTURBANCE, MOOD DISTURBANCE, AND ANXIETY: ICD-10-CM

## 2020-06-15 DIAGNOSIS — N40.0 BENIGN PROSTATIC HYPERPLASIA WITHOUT LOWER URINARY TRACT SYMPTOMS: ICD-10-CM

## 2020-06-15 DIAGNOSIS — G93.40 ENCEPHALOPATHY, UNSPECIFIED: ICD-10-CM

## 2020-06-15 DIAGNOSIS — D69.6 THROMBOCYTOPENIA, UNSPECIFIED: ICD-10-CM

## 2020-06-15 DIAGNOSIS — N17.9 ACUTE KIDNEY FAILURE, UNSPECIFIED: ICD-10-CM

## 2020-06-15 DIAGNOSIS — I48.0 PAROXYSMAL ATRIAL FIBRILLATION: ICD-10-CM

## 2020-06-15 DIAGNOSIS — I34.0 NONRHEUMATIC MITRAL (VALVE) INSUFFICIENCY: ICD-10-CM

## 2020-06-15 DIAGNOSIS — I63.512 CEREBRAL INFARCTION DUE TO UNSPECIFIED OCCLUSION OR STENOSIS OF LEFT MIDDLE CEREBRAL ARTERY: ICD-10-CM

## 2020-06-15 DIAGNOSIS — R47.01 APHASIA: ICD-10-CM

## 2020-06-15 DIAGNOSIS — Z66 DO NOT RESUSCITATE: ICD-10-CM

## 2020-06-15 PROBLEM — Z00.00 ENCOUNTER FOR PREVENTIVE HEALTH EXAMINATION: Status: ACTIVE | Noted: 2020-06-15

## 2020-06-16 PROBLEM — G20 PARKINSON'S DISEASE: Chronic | Status: ACTIVE | Noted: 2020-06-06

## 2020-06-16 PROBLEM — I25.10 ATHEROSCLEROTIC HEART DISEASE OF NATIVE CORONARY ARTERY WITHOUT ANGINA PECTORIS: Chronic | Status: ACTIVE | Noted: 2020-06-06

## 2020-06-19 ENCOUNTER — APPOINTMENT (OUTPATIENT)
Dept: NEUROLOGY | Facility: CLINIC | Age: 85
End: 2020-06-19

## 2020-06-24 ENCOUNTER — APPOINTMENT (OUTPATIENT)
Dept: NEUROLOGY | Facility: CLINIC | Age: 85
End: 2020-06-24
Payer: MEDICARE

## 2020-06-24 VITALS
HEIGHT: 69 IN | OXYGEN SATURATION: 98 % | WEIGHT: 151 LBS | TEMPERATURE: 97.8 F | DIASTOLIC BLOOD PRESSURE: 69 MMHG | HEART RATE: 45 BPM | BODY MASS INDEX: 22.36 KG/M2 | SYSTOLIC BLOOD PRESSURE: 114 MMHG

## 2020-06-24 DIAGNOSIS — I25.10 ATHEROSCLEROTIC HEART DISEASE OF NATIVE CORONARY ARTERY W/OUT ANGINA PECTORIS: ICD-10-CM

## 2020-06-24 DIAGNOSIS — I63.9 CEREBRAL INFARCTION, UNSPECIFIED: ICD-10-CM

## 2020-06-24 DIAGNOSIS — Z78.9 OTHER SPECIFIED HEALTH STATUS: ICD-10-CM

## 2020-06-24 DIAGNOSIS — Z95.1 PRESENCE OF AORTOCORONARY BYPASS GRAFT: ICD-10-CM

## 2020-06-24 DIAGNOSIS — Z87.438 PERSONAL HISTORY OF OTHER DISEASES OF MALE GENITAL ORGANS: ICD-10-CM

## 2020-06-24 DIAGNOSIS — Z86.69 PERSONAL HISTORY OF OTHER DISEASES OF THE NERVOUS SYSTEM AND SENSE ORGANS: ICD-10-CM

## 2020-06-24 PROCEDURE — 99214 OFFICE O/P EST MOD 30 MIN: CPT

## 2020-06-24 RX ORDER — SITAGLIPTIN AND METFORMIN HYDROCHLORIDE 50; 500 MG/1; MG/1
50-500 TABLET, FILM COATED ORAL
Qty: 60 | Refills: 0 | Status: ACTIVE | COMMUNITY
Start: 2019-10-14

## 2020-06-24 RX ORDER — LUBIPROSTONE 24 UG/1
24 CAPSULE, GELATIN COATED ORAL
Qty: 60 | Refills: 0 | Status: ACTIVE | COMMUNITY
Start: 2019-12-04

## 2020-06-24 RX ORDER — MEMANTINE HYDROCHLORIDE 14 MG/1
14 CAPSULE, EXTENDED RELEASE ORAL
Qty: 30 | Refills: 0 | Status: ACTIVE | COMMUNITY
Start: 2019-11-27

## 2020-06-24 RX ORDER — ERGOCALCIFEROL 1.25 MG/1
1.25 MG CAPSULE, LIQUID FILLED ORAL
Qty: 4 | Refills: 0 | Status: ACTIVE | COMMUNITY
Start: 2020-03-19

## 2020-06-24 RX ORDER — TAMSULOSIN HYDROCHLORIDE 0.4 MG/1
0.4 CAPSULE ORAL
Qty: 30 | Refills: 0 | Status: ACTIVE | COMMUNITY
Start: 2019-12-14

## 2020-06-24 RX ORDER — CLOPIDOGREL BISULFATE 75 MG/1
75 TABLET, FILM COATED ORAL DAILY
Refills: 0 | Status: ACTIVE | COMMUNITY

## 2020-06-24 RX ORDER — FERROUS SULFATE TAB EC 325 MG (65 MG FE EQUIVALENT) 325 (65 FE) MG
325 (65 FE) TABLET DELAYED RESPONSE ORAL
Qty: 30 | Refills: 0 | Status: ACTIVE | COMMUNITY
Start: 2020-05-11

## 2020-06-24 RX ORDER — ATORVASTATIN CALCIUM 80 MG/1
80 TABLET, FILM COATED ORAL
Refills: 0 | Status: ACTIVE | COMMUNITY

## 2020-06-24 RX ORDER — CARBIDOPA AND LEVODOPA 25; 100 MG/1; MG/1
25-100 TABLET, ORALLY DISINTEGRATING ORAL
Qty: 90 | Refills: 0 | Status: ACTIVE | COMMUNITY
Start: 2020-05-11

## 2020-06-24 RX ORDER — LACTULOSE 10 G/15ML
10 SOLUTION ORAL
Qty: 473 | Refills: 0 | Status: ACTIVE | COMMUNITY
Start: 2020-05-11

## 2020-06-24 RX ORDER — DEXLANSOPRAZOLE 60 MG/1
60 CAPSULE, DELAYED RELEASE ORAL
Qty: 30 | Refills: 0 | Status: ACTIVE | COMMUNITY
Start: 2020-02-26

## 2020-06-24 RX ORDER — LACTULOSE 10 G/15ML
10 SOLUTION ORAL; RECTAL
Qty: 473 | Refills: 0 | Status: ACTIVE | COMMUNITY
Start: 2020-05-11

## 2020-06-24 RX ORDER — FUROSEMIDE 20 MG/1
20 TABLET ORAL
Qty: 30 | Refills: 0 | Status: ACTIVE | COMMUNITY
Start: 2020-01-30

## 2020-06-24 RX ORDER — PANCRELIPASE 36000; 180000; 114000 [USP'U]/1; [USP'U]/1; [USP'U]/1
36000-114000 CAPSULE, DELAYED RELEASE PELLETS ORAL
Qty: 90 | Refills: 0 | Status: ACTIVE | COMMUNITY
Start: 2020-03-19

## 2020-06-24 RX ORDER — HYDROCORTISONE 1 %
12 CREAM (GRAM) TOPICAL
Qty: 400 | Refills: 0 | Status: ACTIVE | COMMUNITY
Start: 2020-01-30

## 2020-06-24 RX ORDER — RANITIDINE 150 MG/1
150 TABLET ORAL
Qty: 180 | Refills: 0 | Status: ACTIVE | COMMUNITY
Start: 2020-04-01

## 2020-06-24 RX ORDER — FINASTERIDE 5 MG/1
5 TABLET, FILM COATED ORAL
Qty: 30 | Refills: 0 | Status: ACTIVE | COMMUNITY
Start: 2020-02-26

## 2020-06-24 RX ORDER — FOLIC ACID 1 MG/1
1 TABLET ORAL
Qty: 30 | Refills: 0 | Status: ACTIVE | COMMUNITY
Start: 2020-03-19

## 2020-06-24 RX ORDER — METOPROLOL SUCCINATE 25 MG/1
25 TABLET, EXTENDED RELEASE ORAL
Qty: 30 | Refills: 0 | Status: ACTIVE | COMMUNITY
Start: 2020-05-11

## 2020-06-24 NOTE — PHYSICAL EXAM
[FreeTextEntry1] : alert oriented to person place\par Able to name objects, able to speak sentences\par Able to follow commands\par No facial\par No drift\par L>R resting tremor and slow RADHA\par Sensory symmetric to Temp, Vib\par FTN NL\par Gait defered\par \par NIHSS 0\par mrankin 4\par

## 2020-06-24 NOTE — DISCUSSION/SUMMARY
[Intensive Blood Pressure Control] : intensive blood pressure control [Antithrombotic therapy with ___] : antithrombotic therapy with  [unfilled] [Goals and Counseling] : I have reviewed the goals of stroke risk factor modification. I counseled the patient on measures to reduce stroke risk, including the importance of medication compliance, risk factor control, exercise, healthy diet and avoidance of smoking. I reviewed stroke warning signs and symptoms and appropriate actions to take if such occur. [Patient encouraged to discuss with Primary MD] : I encouraged the patient to discuss these important issues with ~his/her~ primary care doctor [Lipid Lowering Therapy] : lipid lowering therapy [FreeTextEntry1] : Mr. Gallagher is a 89yo man with recent stroke most likely secondary to p-afib.  He also was found to have thrombocytopenia and the risk of anticoagulation given his parkinsons, low platelets and age was high.  He does not need dual antipletelet therapy and was supposed to be on aspirin upon discharge however plavix appears to be listed\par 1. Aspirin 162mg QD\par 2. Stop plavix\par 3. Continue atorvastatin\par 4. Continue with PT/OT/Speech\par 5.  to evaluate for additional HHA at home when discharged from rehab

## 2020-06-24 NOTE — HISTORY OF PRESENT ILLNESS
[FreeTextEntry1] : Mr. Gallagher is a 89yo man here for follow up of a stroke.  He presented to [facility] on 6/6/2020 with inability to speak and was out of the window for IV TPA and given his high disability score at baseline was not an interventional candidate.  He was admitted to the stroke unit and underwent work up confirming a stroke and was then also found to have paroxysmal afib.  Risk of anticoagulation was felt to be high as he also had thrombocytopenia.  He was eventually discharged to rehab on aspirin and plavix.  He has been doing good in rehab and his language is significantly improved.  He stil sometimes has some difficulty with words but it is much better.  He has Parkinsons disease and likely due too de-conditioning while he was admitted with the stroke he required therapy.  His hospital course and results are below.\par He was found to have low platelets and the recommendation was to stop the plavix and continue the aspirin\par \par < from: MR Head No Cont (06.07.20 @ 09:17) >\par IMPRESSION: Patchy foci of hyperintense signal intensity on the T2, FLAIR and diffusion-weighted images in the left temporal lobe, left insular cortex and small focus in the left parietal lobe consistent with an acute ischemic change.\par \par < from: CT Brain Stroke Protocol (06.06.20 @ 19:24) >\par IMPRESSION: No evidence for acute intracranial hemorrhage, midline shift, or mass effect.\par \par 6. Relevant Cerebrovascular Imaging: \par < from: CT Angio Head w/ IV Cont (06.09.20 @ 14:44) >\par Findings: \par \par NECK:\par The visualized aortic arch and great vessel origins demonstrate mild calcific plaque without significant stenosis.\par The common, internal and external carotid arteries are patent bilaterally. There is calcific plaque of the carotid bifurcations without significant stenosis.\par The vertebral arteries are patent. There is scattered calcific plaque of the left vertebral artery with scattered mild stenoses throughout the cervical segments and mild to moderate stenoses of the V4 segment.\par \par HEAD:\par There is extensive calcific plaque of the carotid siphons with likely mild to moderate stenoses bilaterally. The anterior and middle cerebral arteries are patent. \par \par The basilar artery is patent. The posterior cerebral arteries are patent.\par \par OTHER:\par Cervical spine degenerative changes and osteopenia.\par Right > left left pleural effusions. Post median sternotomy.\par \par IMPRESSION: \par 1.  No evidence of acute large vessel occlusion.\par 2.  Scattered calcific plaque with mild to moderate stenoses of the carotid siphons and left vertebral artery.\par \par  < from: MR Angio Neck w/wo IV Cont (06.07.20 @ 09:19) >\par IMPRESSION: Unremarkable MRA of the neck.\par \par < from: MR Angio Head No Cont (06.07.20 @ 09:17) >\par IMPRESSION: Unremarkable MRA of the brain.\par \par 7. Relevant blood tests:  \par \par COVID-19 PCR . (06.06.20 @ 20:44)\par   COVID-19 PCR: NotDetec: Methodology:\par \par Lipid Profile (06.07.20 @ 05:26)\par   Total Cholesterol/HDL Ratio Measurement: 3.2 Ratio\par   Cholesterol, Serum: 165 mg/dL\par   Triglycerides, Serum: 50 mg/dL\par   HDL Cholesterol, Serum: 52: HDL Levels >/= 60 mg/dL are considered beneficial and a "negative" risk\par factor.\par Effective 08/15/2018: New reference range and interpretive comment. mg/dL\par   Direct LDL: 106: LDL Cholesterol (mg/dL) --- Interpretive Comment (for adults 18 and over)\par \par A1C with Estimated Average Glucose (06.07.20 @ 05:26)\par   A1C with Estimated Average Glucose Result: 5.8: Method: Immunoassay\par \par 8. Relevant cardiac rhythm monitoring: New onset Afib flutter\par < from: 12 Lead ECG (06.09.20 @ 16:13) >\par Ventricular Rate 65 BPM\par Atrial Rate 260 BPM\par QRS Duration 146 ms\par Q-T Interval 468 ms\par QTC Calculation(Bezet) 486 ms\par P Axis 123 degrees\par R Axis -1 degrees\par T Axis 116 degrees\par Diagnosis Line Atrial flutter with variable A-V block\par Left bundle branch block\par Abnormal ECG\par \par 9. Relevant Cardiac Structure: (TTE/MARIAH +/-):[ ]No intracardiac thrombus/[ ] no vegetation/[ ]no [place]/EF:\par < from: Transthoracic Echocardiogram (06.08.20 @ 11:13) >\par \par Summary:\par  1. LV Ejection Fraction by Snowden's Method with a biplane EF of 53 %.\par  2. Normal left ventricular size and wall thicknesses, with normal systolic and diastolic function.\par  3. The left ventricular diastolic function could not be assessed in this study.\par  4. Degenerative mitral valve.\par  5. Mild mitral valve regurgitation.\par  6. Moderate thickening and calcification of the anterior and posterior mitral valve leaflets.\par  7. Severe mitral annular calcification.\par  8. Sclerotic aortic valve with normal opening.\par  9. Estimated pulmonary artery systolic pressure is 45.2 mmHg assuming a right atrial pressure of 3 mmHg, which is consistent with mild pulmonary hypertension.\par 10. Pulmonary hypertension is present.\par 11. Color flow doppler and intravenous injection of agitated saline demonstrates the presence of an intact intra atrial septum.\par 12. LA volume Index is 29.2 ml/m² ml/m2.\par \par

## 2020-06-25 LAB
ALBUMIN SERPL ELPH-MCNC: 3.9 G/DL
ALP BLD-CCNC: 85 U/L
ALT SERPL-CCNC: <5 U/L
ANION GAP SERPL CALC-SCNC: 11 MMOL/L
APTT BLD: 31.4 SEC
AST SERPL-CCNC: 14 U/L
BASOPHILS # BLD AUTO: 0.04 K/UL
BASOPHILS NFR BLD AUTO: 0.4 %
BILIRUB SERPL-MCNC: 0.2 MG/DL
BUN SERPL-MCNC: 34 MG/DL
CALCIUM SERPL-MCNC: 9.1 MG/DL
CHLORIDE SERPL-SCNC: 103 MMOL/L
CO2 SERPL-SCNC: 27 MMOL/L
CREAT SERPL-MCNC: 1.3 MG/DL
EOSINOPHIL # BLD AUTO: 0.05 K/UL
EOSINOPHIL NFR BLD AUTO: 0.5 %
GLUCOSE SERPL-MCNC: 146 MG/DL
HCT VFR BLD CALC: 33.6 %
HGB BLD-MCNC: 10.4 G/DL
IMM GRANULOCYTES NFR BLD AUTO: 0.3 %
INR PPP: 1.03 RATIO
LYMPHOCYTES # BLD AUTO: 2.09 K/UL
LYMPHOCYTES NFR BLD AUTO: 22.2 %
MAN DIFF?: NORMAL
MCHC RBC-ENTMCNC: 29.9 PG
MCHC RBC-ENTMCNC: 31 G/DL
MCV RBC AUTO: 96.6 FL
MONOCYTES # BLD AUTO: 0.47 K/UL
MONOCYTES NFR BLD AUTO: 5 %
NEUTROPHILS # BLD AUTO: 6.74 K/UL
NEUTROPHILS NFR BLD AUTO: 71.6 %
PLATELET # BLD AUTO: 125 K/UL
POTASSIUM SERPL-SCNC: 5.1 MMOL/L
PROT SERPL-MCNC: 6.4 G/DL
PT BLD: 11.9 SEC
RBC # BLD: 3.48 M/UL
RBC # FLD: 14.5 %
SODIUM SERPL-SCNC: 141 MMOL/L
WBC # FLD AUTO: 9.42 K/UL

## 2021-04-15 NOTE — ED PROVIDER NOTE - MDM PATIENT STATEMENT FOR ADDL TREATMENT
Patient with one or more new problems requiring additional work-up/treatment.
Detail Level: Detailed
SIDE EFFECT OF ELEVATED TGS\\n\\nLAB ORDER PROVIDED\\nCALL AND LET US KNOW WHEN/WHERE BLOOD DRAWN SO WE CAN TRACK IT DOWN
suggested using gentle cleansers. \\nWait 30 days before trying to conceive.\\nIf breaks out, follow up.

## 2021-07-22 ENCOUNTER — APPOINTMENT (OUTPATIENT)
Dept: NEUROLOGY | Facility: CLINIC | Age: 86
End: 2021-07-22

## 2021-08-21 ENCOUNTER — INPATIENT (INPATIENT)
Facility: HOSPITAL | Age: 86
LOS: 8 days | Discharge: NURSING, REHAB OR LONG-TERM CARE | End: 2021-08-30
Attending: INTERNAL MEDICINE | Admitting: INTERNAL MEDICINE
Payer: MEDICARE

## 2021-08-21 VITALS
HEIGHT: 69 IN | HEART RATE: 82 BPM | SYSTOLIC BLOOD PRESSURE: 127 MMHG | OXYGEN SATURATION: 98 % | DIASTOLIC BLOOD PRESSURE: 80 MMHG | TEMPERATURE: 97 F | RESPIRATION RATE: 18 BRPM

## 2021-08-21 DIAGNOSIS — Z95.1 PRESENCE OF AORTOCORONARY BYPASS GRAFT: Chronic | ICD-10-CM

## 2021-08-21 DIAGNOSIS — E86.0 DEHYDRATION: ICD-10-CM

## 2021-08-21 LAB
ALBUMIN SERPL ELPH-MCNC: 3.9 G/DL — SIGNIFICANT CHANGE UP (ref 3.5–5.2)
ALP SERPL-CCNC: 90 U/L — SIGNIFICANT CHANGE UP (ref 30–115)
ALT FLD-CCNC: 7 U/L — SIGNIFICANT CHANGE UP (ref 0–41)
ANION GAP SERPL CALC-SCNC: 15 MMOL/L — HIGH (ref 7–14)
APTT BLD: 31 SEC — SIGNIFICANT CHANGE UP (ref 27–39.2)
AST SERPL-CCNC: 13 U/L — SIGNIFICANT CHANGE UP (ref 0–41)
BILIRUB SERPL-MCNC: 0.7 MG/DL — SIGNIFICANT CHANGE UP (ref 0.2–1.2)
BLD GP AB SCN SERPL QL: SIGNIFICANT CHANGE UP
BUN SERPL-MCNC: 31 MG/DL — HIGH (ref 10–20)
CALCIUM SERPL-MCNC: 9 MG/DL — SIGNIFICANT CHANGE UP (ref 8.5–10.1)
CHLORIDE SERPL-SCNC: 103 MMOL/L — SIGNIFICANT CHANGE UP (ref 98–110)
CO2 SERPL-SCNC: 21 MMOL/L — SIGNIFICANT CHANGE UP (ref 17–32)
CREAT SERPL-MCNC: 1.1 MG/DL — SIGNIFICANT CHANGE UP (ref 0.7–1.5)
GLUCOSE BLDC GLUCOMTR-MCNC: 89 MG/DL — SIGNIFICANT CHANGE UP (ref 70–99)
GLUCOSE SERPL-MCNC: 90 MG/DL — SIGNIFICANT CHANGE UP (ref 70–99)
HCT VFR BLD CALC: 34.2 % — LOW (ref 42–52)
HGB BLD-MCNC: 11.2 G/DL — LOW (ref 14–18)
INR BLD: 1.04 RATIO — SIGNIFICANT CHANGE UP (ref 0.65–1.3)
MCHC RBC-ENTMCNC: 30.2 PG — SIGNIFICANT CHANGE UP (ref 27–31)
MCHC RBC-ENTMCNC: 32.7 G/DL — SIGNIFICANT CHANGE UP (ref 32–37)
MCV RBC AUTO: 92.2 FL — SIGNIFICANT CHANGE UP (ref 80–94)
NRBC # BLD: 0 /100 WBCS — SIGNIFICANT CHANGE UP (ref 0–0)
PLATELET # BLD AUTO: 92 K/UL — LOW (ref 130–400)
POTASSIUM SERPL-MCNC: 4.7 MMOL/L — SIGNIFICANT CHANGE UP (ref 3.5–5)
POTASSIUM SERPL-SCNC: 4.7 MMOL/L — SIGNIFICANT CHANGE UP (ref 3.5–5)
PROT SERPL-MCNC: 6.7 G/DL — SIGNIFICANT CHANGE UP (ref 6–8)
PROTHROM AB SERPL-ACNC: 11.9 SEC — SIGNIFICANT CHANGE UP (ref 9.95–12.87)
RBC # BLD: 3.71 M/UL — LOW (ref 4.7–6.1)
RBC # FLD: 13.1 % — SIGNIFICANT CHANGE UP (ref 11.5–14.5)
SARS-COV-2 RNA SPEC QL NAA+PROBE: SIGNIFICANT CHANGE UP
SODIUM SERPL-SCNC: 139 MMOL/L — SIGNIFICANT CHANGE UP (ref 135–146)
TROPONIN T SERPL-MCNC: 0.03 NG/ML — CRITICAL HIGH
WBC # BLD: 6.99 K/UL — SIGNIFICANT CHANGE UP (ref 4.8–10.8)
WBC # FLD AUTO: 6.99 K/UL — SIGNIFICANT CHANGE UP (ref 4.8–10.8)

## 2021-08-21 PROCEDURE — 99223 1ST HOSP IP/OBS HIGH 75: CPT

## 2021-08-21 PROCEDURE — 71045 X-RAY EXAM CHEST 1 VIEW: CPT | Mod: 26

## 2021-08-21 PROCEDURE — 72192 CT PELVIS W/O DYE: CPT | Mod: 26,MA

## 2021-08-21 PROCEDURE — 70450 CT HEAD/BRAIN W/O DYE: CPT | Mod: 26,MA

## 2021-08-21 PROCEDURE — 72170 X-RAY EXAM OF PELVIS: CPT | Mod: 26

## 2021-08-21 PROCEDURE — 73552 X-RAY EXAM OF FEMUR 2/>: CPT | Mod: 26,RT

## 2021-08-21 PROCEDURE — 93010 ELECTROCARDIOGRAM REPORT: CPT

## 2021-08-21 PROCEDURE — 73560 X-RAY EXAM OF KNEE 1 OR 2: CPT | Mod: 26,RT

## 2021-08-21 PROCEDURE — 99284 EMERGENCY DEPT VISIT MOD MDM: CPT

## 2021-08-21 RX ORDER — DEXLANSOPRAZOLE 30 MG/1
1 CAPSULE, DELAYED RELEASE ORAL
Qty: 0 | Refills: 0 | DISCHARGE

## 2021-08-21 RX ORDER — LIPASE/PROTEASE/AMYLASE 16-48-48K
1 CAPSULE,DELAYED RELEASE (ENTERIC COATED) ORAL
Qty: 0 | Refills: 0 | DISCHARGE

## 2021-08-21 RX ORDER — LUBIPROSTONE 24 UG/1
1 CAPSULE, GELATIN COATED ORAL
Qty: 0 | Refills: 0 | DISCHARGE

## 2021-08-21 RX ORDER — GABAPENTIN 400 MG/1
1 CAPSULE ORAL
Qty: 0 | Refills: 0 | DISCHARGE

## 2021-08-21 RX ORDER — TAMSULOSIN HYDROCHLORIDE 0.4 MG/1
0.4 CAPSULE ORAL AT BEDTIME
Refills: 0 | Status: DISCONTINUED | OUTPATIENT
Start: 2021-08-21 | End: 2021-08-30

## 2021-08-21 RX ORDER — METOPROLOL TARTRATE 50 MG
0.5 TABLET ORAL
Qty: 0 | Refills: 0 | DISCHARGE

## 2021-08-21 RX ORDER — CHOLECALCIFEROL (VITAMIN D3) 125 MCG
1 CAPSULE ORAL
Qty: 0 | Refills: 0 | DISCHARGE

## 2021-08-21 RX ORDER — POLYETHYLENE GLYCOL 3350 17 G/17G
17 POWDER, FOR SOLUTION ORAL
Refills: 0 | Status: DISCONTINUED | OUTPATIENT
Start: 2021-08-21 | End: 2021-08-30

## 2021-08-21 RX ORDER — MORPHINE SULFATE 50 MG/1
2 CAPSULE, EXTENDED RELEASE ORAL EVERY 4 HOURS
Refills: 0 | Status: DISCONTINUED | OUTPATIENT
Start: 2021-08-21 | End: 2021-08-27

## 2021-08-21 RX ORDER — RANITIDINE HYDROCHLORIDE 150 MG/1
1 TABLET, FILM COATED ORAL
Qty: 0 | Refills: 0 | DISCHARGE

## 2021-08-21 RX ORDER — ASPIRIN/CALCIUM CARB/MAGNESIUM 324 MG
81 TABLET ORAL DAILY
Refills: 0 | Status: DISCONTINUED | OUTPATIENT
Start: 2021-08-21 | End: 2021-08-30

## 2021-08-21 RX ORDER — ACETAMINOPHEN 500 MG
650 TABLET ORAL EVERY 6 HOURS
Refills: 0 | Status: DISCONTINUED | OUTPATIENT
Start: 2021-08-21 | End: 2021-08-30

## 2021-08-21 RX ORDER — CHLORHEXIDINE GLUCONATE 213 G/1000ML
1 SOLUTION TOPICAL
Refills: 0 | Status: DISCONTINUED | OUTPATIENT
Start: 2021-08-21 | End: 2021-08-30

## 2021-08-21 RX ORDER — PANTOPRAZOLE SODIUM 20 MG/1
40 TABLET, DELAYED RELEASE ORAL DAILY
Refills: 0 | Status: DISCONTINUED | OUTPATIENT
Start: 2021-08-21 | End: 2021-08-22

## 2021-08-21 RX ORDER — FUROSEMIDE 40 MG
1 TABLET ORAL
Qty: 0 | Refills: 0 | DISCHARGE

## 2021-08-21 RX ORDER — MEMANTINE HYDROCHLORIDE 10 MG/1
1 TABLET ORAL
Qty: 0 | Refills: 0 | DISCHARGE

## 2021-08-21 RX ORDER — LIPASE/PROTEASE/AMYLASE 16-48-48K
1 CAPSULE,DELAYED RELEASE (ENTERIC COATED) ORAL
Refills: 0 | Status: DISCONTINUED | OUTPATIENT
Start: 2021-08-21 | End: 2021-08-21

## 2021-08-21 RX ORDER — ATORVASTATIN CALCIUM 80 MG/1
40 TABLET, FILM COATED ORAL AT BEDTIME
Refills: 0 | Status: DISCONTINUED | OUTPATIENT
Start: 2021-08-21 | End: 2021-08-30

## 2021-08-21 RX ORDER — METOPROLOL TARTRATE 50 MG
25 TABLET ORAL DAILY
Refills: 0 | Status: DISCONTINUED | OUTPATIENT
Start: 2021-08-21 | End: 2021-08-22

## 2021-08-21 RX ORDER — ONDANSETRON 8 MG/1
4 TABLET, FILM COATED ORAL EVERY 8 HOURS
Refills: 0 | Status: DISCONTINUED | OUTPATIENT
Start: 2021-08-21 | End: 2021-08-30

## 2021-08-21 RX ORDER — LIPASE/PROTEASE/AMYLASE 16-48-48K
2 CAPSULE,DELAYED RELEASE (ENTERIC COATED) ORAL
Refills: 0 | Status: DISCONTINUED | OUTPATIENT
Start: 2021-08-21 | End: 2021-08-30

## 2021-08-21 RX ORDER — METOPROLOL TARTRATE 50 MG
1 TABLET ORAL
Qty: 0 | Refills: 0 | DISCHARGE

## 2021-08-21 RX ORDER — ATORVASTATIN CALCIUM 80 MG/1
1 TABLET, FILM COATED ORAL
Qty: 0 | Refills: 0 | DISCHARGE

## 2021-08-21 RX ORDER — SODIUM CHLORIDE 9 MG/ML
500 INJECTION INTRAMUSCULAR; INTRAVENOUS; SUBCUTANEOUS ONCE
Refills: 0 | Status: COMPLETED | OUTPATIENT
Start: 2021-08-21 | End: 2021-08-21

## 2021-08-21 RX ORDER — METOPROLOL TARTRATE 50 MG
12.5 TABLET ORAL DAILY
Refills: 0 | Status: DISCONTINUED | OUTPATIENT
Start: 2021-08-21 | End: 2021-08-21

## 2021-08-21 RX ORDER — CARBIDOPA AND LEVODOPA 25; 100 MG/1; MG/1
1 TABLET ORAL THREE TIMES A DAY
Refills: 0 | Status: DISCONTINUED | OUTPATIENT
Start: 2021-08-21 | End: 2021-08-30

## 2021-08-21 RX ORDER — GABAPENTIN 400 MG/1
300 CAPSULE ORAL DAILY
Refills: 0 | Status: DISCONTINUED | OUTPATIENT
Start: 2021-08-21 | End: 2021-08-30

## 2021-08-21 RX ORDER — FOLIC ACID 0.8 MG
1 TABLET ORAL DAILY
Refills: 0 | Status: DISCONTINUED | OUTPATIENT
Start: 2021-08-21 | End: 2021-08-30

## 2021-08-21 RX ORDER — FERROUS SULFATE 325(65) MG
325 TABLET ORAL THREE TIMES A DAY
Refills: 0 | Status: DISCONTINUED | OUTPATIENT
Start: 2021-08-21 | End: 2021-08-30

## 2021-08-21 RX ORDER — LANOLIN ALCOHOL/MO/W.PET/CERES
3 CREAM (GRAM) TOPICAL AT BEDTIME
Refills: 0 | Status: DISCONTINUED | OUTPATIENT
Start: 2021-08-21 | End: 2021-08-30

## 2021-08-21 RX ORDER — ENOXAPARIN SODIUM 100 MG/ML
40 INJECTION SUBCUTANEOUS DAILY
Refills: 0 | Status: DISCONTINUED | OUTPATIENT
Start: 2021-08-21 | End: 2021-08-24

## 2021-08-21 RX ORDER — FINASTERIDE 5 MG/1
5 TABLET, FILM COATED ORAL DAILY
Refills: 0 | Status: DISCONTINUED | OUTPATIENT
Start: 2021-08-21 | End: 2021-08-30

## 2021-08-21 RX ORDER — SODIUM CHLORIDE 9 MG/ML
1000 INJECTION INTRAMUSCULAR; INTRAVENOUS; SUBCUTANEOUS
Refills: 0 | Status: DISCONTINUED | OUTPATIENT
Start: 2021-08-21 | End: 2021-08-25

## 2021-08-21 RX ADMIN — SODIUM CHLORIDE 1000 MILLILITER(S): 9 INJECTION INTRAMUSCULAR; INTRAVENOUS; SUBCUTANEOUS at 12:13

## 2021-08-21 RX ADMIN — MORPHINE SULFATE 2 MILLIGRAM(S): 50 CAPSULE, EXTENDED RELEASE ORAL at 20:06

## 2021-08-21 RX ADMIN — SODIUM CHLORIDE 60 MILLILITER(S): 9 INJECTION INTRAMUSCULAR; INTRAVENOUS; SUBCUTANEOUS at 20:06

## 2021-08-21 NOTE — H&P ADULT - NSICDXPASTMEDICALHX_GEN_ALL_CORE_FT
PAST MEDICAL HISTORY:  Benign prostate hyperplasia     Chronic atrial fibrillation     Coronary artery disease     History of iron deficiency     Parkinson disease     Stroke, embolic

## 2021-08-21 NOTE — H&P ADULT - NSHPPHYSICALEXAM_GEN_ALL_CORE
Vital Signs Last 24 Hrs  T(C): 36.2 (21 Aug 2021 11:34), Max: 36.2 (21 Aug 2021 11:34)  T(F): 97.1 (21 Aug 2021 11:34), Max: 97.1 (21 Aug 2021 11:34)  HR: 82 (21 Aug 2021 11:34) (82 - 82)  BP: 127/80 (21 Aug 2021 11:34) (127/80 - 127/80)  BP(mean): --  RR: 18 (21 Aug 2021 11:34) (18 - 18)  SpO2: 98% (21 Aug 2021 11:34) (98% - 98%)    in NAD  heart: irregular S1S2, no audible murmurs  lungs: clear breath sounds to ant auscultation   abdomen: soft, non tender, non distended  no BRODIE  no calf tenderness  RLE + pedal pulse, warm, no hip tenderness, no knee swelling or tenderness

## 2021-08-21 NOTE — H&P ADULT - ASSESSMENT
89 year old male patient with past medical history of type 2 DM, CAD s/p CABG, chronic afib, bilateral thromboembolic stroke in 2020, Parkinson's disease, Iron deficiency anemia, folic acid deficiency, benign prostatic hyperplasia, came in for fall this morning with no witnessed LOC    # fall :  - with no witnessed syncope  - complicated by Acute right intertrochanteric fracture with mild comminution and mild displacement at the greater trochanter.  - ortho team consulted: recommend X ray hip, x ray pelvis, x ray femur and x ray knee  - admit to telemetry   - EKG showed Afib with RVR with no ischemic changes   - CT head was negative for any IC pathology   - check orthostatic vitals  - c/w hydration NS 60 cc / h  - TTE ( last EF 53% on june 2020 / no other valvular abnormalities )  - send for type and screen , coagulation studies   - trend trop     # bilateral thromboembolic stroke:  - in june 2020 most likely secondary to his afib  - patient was supposed to f/u with neurology as OP but he never followed up with them to check if he is a candidate for AC  - does not have any cardiologist   - not on AC now  - c/w aspirin + statins     # chronic afib:  - increase metoprolol ER 12.5 to 25 mg po od  - can switch to metoprolol tartrate if he is still having HR> 110     # Parkinson's disease   - Continue Carbidopa / levodopa 25/100 TID     # CAD s/p CABG   - Continue Aspirin 81 mg qd + Lipitor 40 mg qhs + metoprolol     # Thrombocytopenia   - same at baseline  - not investigated previously   - no signs of bleed     # Benign prostatic hyperplasia   - Continue Flomax 0.4 mg qhs + Finasteride 5 mg qd     # GERD  - switch from dexilant to Protonix 40 mg qhs in hospital     # Miscellaneous   - DVT prophylaxis : lovenox    - GI prophylaxis : Protonix 40 mg qd   - Activity : bedrest for now pending ortho eval  - Diet : speech and swallow assessment   - Code status : Full code     * Per family no known history of Diabetes ( hba1c 5.8 ) , the patient takes 0.5 tabs of Janumet PRN if FS is high, but he tends to get hypoglycemic   * Family denies any history of chronic pancreatitis / pancreatic insufficiency even though patient is on Creon      Grand daughter Flor at bedside: informed about the diagnosis and the prognosis. plan of care discussed with her clearly  advised her to call her Mom Kassie on [phone] to discuss GOC and to inform the medical team about the family decision

## 2021-08-21 NOTE — ED ADULT NURSE NOTE - NSIMPLEMENTINTERV_GEN_ALL_ED
Implemented All Fall with Harm Risk Interventions:  [place] to call system. Call bell, personal items and telephone within reach. Instruct patient to call for assistance. Room bathroom lighting operational. Non-slip footwear when patient is off stretcher. Physically safe environment: no spills, clutter or unnecessary equipment. Stretcher in lowest position, wheels locked, appropriate side rails in place. Provide visual cue, wrist band, yellow gown, etc. Monitor gait and stability. Monitor for mental status changes and reorient to person, place, and time. Review medications for side effects contributing to fall risk. Reinforce activity limits and safety measures with patient and family. Provide visual clues: red socks.

## 2021-08-21 NOTE — CONSULT NOTE ADULT - ASSESSMENT
Pt Name: ALEXY PURDY  MRN: [id number]      HPI: 89yMale presents with pain in right hip. Had a fall 3 day ago, has not been able to ambulate since.  Hx obtained from granddaughter.      PAST MEDICAL & SURGICAL HISTORY:  Parkinson disease    Coronary artery disease    Chronic atrial fibrillation    Stroke, embolic    Benign prostate hyperplasia    History of iron deficiency    S/P CABG (coronary artery bypass graft)        Allergies: No Known Allergies      Medications: acetaminophen   Tablet .. 650 milliGRAM(s) Oral every 6 hours PRN  aluminum hydroxide/magnesium hydroxide/simethicone Suspension 30 milliLiter(s) Oral every 4 hours PRN  aspirin enteric coated 81 milliGRAM(s) Oral daily  atorvastatin 40 milliGRAM(s) Oral at bedtime  bisacodyl 10 milliGRAM(s) Oral at bedtime  carbidopa/levodopa  25/100 1 Tablet(s) Oral three times a day  chlorhexidine 4% Liquid 1 Application(s) Topical <User Schedule>  enoxaparin Injectable 40 milliGRAM(s) SubCutaneous daily  ferrous    sulfate 325 milliGRAM(s) Oral three times a day  finasteride 5 milliGRAM(s) Oral daily  folic acid 1 milliGRAM(s) Oral daily  gabapentin 300 milliGRAM(s) Oral daily  melatonin 3 milliGRAM(s) Oral at bedtime PRN  metoprolol succinate ER 25 milliGRAM(s) Oral daily  morphine  - Injectable 2 milliGRAM(s) IV Push every 4 hours PRN  ondansetron Injectable 4 milliGRAM(s) IV Push every 8 hours PRN  pancrelipase  (CREON 12,000 Lipase Units) 2 Capsule(s) Oral three times a day with meals  pantoprazole  Injectable 40 milliGRAM(s) IV Push daily  polyethylene glycol 3350 17 Gram(s) Oral two times a day  sodium chloride 0.9%. 1000 milliLiter(s) IV Continuous <Continuous>  tamsulosin 0.4 milliGRAM(s) Oral at bedtime        PHYSICAL EXAM:    Vital Signs Last 24 Hrs  T(C): 36.6 (21 Aug 2021 20:52), Max: 36.6 (21 Aug 2021 20:52)  T(F): 97.9 (21 Aug 2021 20:52), Max: 97.9 (21 Aug 2021 20:52)  HR: 100 (21 Aug 2021 20:52) (82 - 100)  BP: 118/66 (21 Aug 2021 20:52) (118/66 - 127/80)  BP(mean): --  RR: 18 (21 Aug 2021 20:52) (18 - 18)  SpO2: 97% (21 Aug 2021 20:52) (97% - 98%)    Physical Exam:  General: NAD, awake    RLE:  No open skin or wounds  TTP GT, pain with hip flexion, no pain with log roll  feet wwp  Compartments soft and compressible.   No pain on passive stretch.    Labs:                        11.2   6.99  )-----------( 92       ( 21 Aug 2021 12:30 )             34.2     08-21    139  |  103  |  31<H>  ----------------------------<  90  4.7   |  21  |  1.1    Ca    9.0      21 Aug 2021 12:30    TPro  6.7  /  Alb  3.9  /  TBili  0.7  /  DBili  x   /  AST  13  /  ALT  7   /  AlkPhos  90  08-21    PT/INR - ( 21 Aug 2021 15:21 )   PT: 11.90 sec;   INR: 1.04 ratio         PTT - ( 21 Aug 2021 15:21 )  PTT:31.0 sec    A/P:    89y Male with right greater trochanter fracture  -Please obtain pelvis MRI, non con to evaluate for intertrochanteric extension  -NWB RLE  -pain control  -please obtain BLE duplex given 3+ days of immobilization  -DVT ppx; SCD + lvx daily  -ortho to follow     A/P:    89y Male with right greater trochanter fracture  -Please obtain pelvis MRI, non con to evaluate for intertrochanteric extension  -NWB RLE  -pain control  -please obtain BLE duplex given 3+ days of immobilization  -DVT ppx; SCD + lvx daily  -ortho to follow

## 2021-08-21 NOTE — ED PROVIDER NOTE - PROGRESS NOTE DETAILS
Updated patient and granddaughter on CT result of R hip fx and labs. Pt declines pain ,medicine at this time. Called ortho, however they are currently scrubbed, will callback

## 2021-08-21 NOTE — ED ADULT TRIAGE NOTE - CHIEF COMPLAINT QUOTE
pt BIBa from home with complaint of back and hip pain, pt had a fall a couple of days ago and was seen and had xrays done

## 2021-08-21 NOTE — H&P ADULT - HISTORY OF PRESENT ILLNESS
89 year old male patient with past medical history of type 2 DM, CAD s/p CABG, chronic afib, bilateral thromboembolic stroke in 2020, Parkinson's disease, Iron deficiency anemia, folic acid deficiency, benign prostatic hyperplasia, came in for fall this morning  hx obtained from his grand daughter at the bedside. she reports that his daughter found him on the floor in the bathroom. fall happened when the patient was trying to get out of bed and was going to the bathroom while using his rolling walker. he suddenly fell down, no witnessed LOC, seizures like activity. they found him on the floor, was awake at that time. as per the grand daughter, he never had LOC in the past while he was sitting on a chair or laying in his bed but she reports that 1 or 2 days prior to the fall, he starts having decrease PO intake but she denies any diarrhea, no dysuria, no fever or chills, no cough or sob  patient was transferred to the ER, clinically and hemodynamically stable  labs showed Trop 0.03. EKG showed AFib   trauma work up showed Acute right intertrochanteric fracture with mild comminution and mild displacement at the greater trochanter  orthopedic consult placed and team made aware of these findings. they recommend to order Xray hip, x ray femur and pelvis + x ray knee

## 2021-08-21 NOTE — H&P ADULT - NSHPLABSRESULTS_GEN_ALL_CORE
LABS:                        11.2   6.99  )-----------( 92       ( 21 Aug 2021 12:30 )             34.2     08-21    139  |  103  |  31<H>  ----------------------------<  90  4.7   |  21  |  1.1    Ca    9.0      21 Aug 2021 12:30    TPro  6.7  /  Alb  3.9  /  TBili  0.7  /  DBili  x   /  AST  13  /  ALT  7   /  AlkPhos  90  08-21    PT/INR - ( 21 Aug 2021 15:21 )   PT: 11.90 sec;   INR: 1.04 ratio         PTT - ( 21 Aug 2021 15:21 )  PTT:31.0 sec    CAPILLARY BLOOD GLUCOSE          RADIOLOGY & ADDITIONAL TESTS: Reviewed.    Acute right intertrochanteric fracture with mild comminution and mild displacement at the greater trochanter.

## 2021-08-21 NOTE — ED PROVIDER NOTE - ATTENDING CONTRIBUTION TO CARE
89 year old male, pmhx as documented presenting s/p syncopal episode 2 days ago s/p falling onto her R hip. Patient had syncopal episode 2 days ago in the bathroom and since then has had pain to his right hip and inability to ambulate (usually ambulates with walker). Syncope episode was unwitnessed and patient denies memory of the event. Patient states pain is achy, isolated to right hip, no palliative or provocative factors, moderate severity, non-radiating. Otherwise denies active complaints.    Vital Signs: I have reviewed the initial vital signs.  Constitutional: NAD, well-nourished, appears stated age, no acute distress.  HEENT: Airway patent, moist MM, no erythema/swelling/deformity of oral structures. EOMI, PERRLA.  CV: regular rate, regular rhythm, well-perfused extremities, 2+ b/l DP and radial pulses equal.  Lungs: BCTA, no increased WOB.  ABD: NTND, no guarding or rebound, no pulsatile mass, no hernias.   MSK: Neck supple, nontender, nl ROM, no stepoff. Chest nontender. Back nontender in TLS spine or to b/l bony structures or flanks. (+) right hip tenderness. Ext nontender, nl rom, no deformity.   INTEG: Skin warm, dry, no rash.  NEURO: A&Ox3, normal strength, nl sensation throughout, normal speech.   PSYCH: Calm, cooperative, normal affect and interaction.    Will obtain CT head/pelvis for tenderness/trauma, labs, EKG, CXR re-eval.

## 2021-08-21 NOTE — ED PROVIDER NOTE - PHYSICAL EXAMINATION
CONST: Well appearing in NAD  EYES: PERRL, EOMI, Sclera and conjunctiva clear.   ENT: No nasal discharge.   NECK: Non-tender, no c-spine tenderness  CARD: Normal S1 S2; Normal rate and rhythm  RESP: Equal BS B/L, No wheezes, rhonchi or rales. No distress  GI: Soft, non-tender, non-distended.  MS: Moderate R posterior hip tenderness, no external shortening or rotation. Limited ROM 2/2 pain  SKIN: Warm, dry, no acute rashes. Good turgor  NEURO: A&Ox3, No focal deficits. Strength 5/5 with no sensory deficits.

## 2021-08-21 NOTE — ED PROVIDER NOTE - OBJECTIVE STATEMENT
89yo male with PMHx DM, CAD, CVA with limited residual effects, paroxysmal AFIB, Parkinsons presents brought in by granddaughter for syncopal episode 2 days prior and falling onto R hip. As per granddaughter's translation, patient was walking to bathroom 2 evenings prior and syncopzied falling onto R hip, laid on floor for around an hour unable to get up until daughter awoke for work. Pt denies hitting head. Pt usually ambulates with a walker, but has been unable to bear weight 2/2 constant R hip with radiation down to R knee, aggravated by movements, relieved by laying still. Granddaughter stated visiting MD that does homecare performed XR yesterday in home for hip and was told was negative. Pt also has been refusing to eat or drink since yesterday 2/2 pain

## 2021-08-21 NOTE — ED PROVIDER NOTE - CLINICAL SUMMARY MEDICAL DECISION MAKING FREE TEXT BOX
Patient presented s/p syncopal episode and fall in the bathroom (unwitnessed) now with right hip pain. Otherwise on arrival patient afebrile, HD stable, neurovascularly intact but (+) tenderness to right hip. In terms of syncope, obtained EKG which was non-ischemic with normal intervals. Obtained labs which showed (+) mildly elevated troponin but otherwise were grossly unremarkable including no significant leukocytosis, anemia, signs of dehydration/NAVI, transaminitis or significant electrolyte abnormalities. Chest xray negative for pneumothorax, pneumonia, widened mediastinum, evidence of rib fractures, enlarged cardiac silhouette or any other emergent pathologies. In terms of trauma, CT head negative but CT pelvis showed (+) right intertrochanteric fx. Consulted ortho and will require admission for further work up and management. Patient and family agreeable with plan. HD stable at time of admission.

## 2021-08-22 LAB
ANION GAP SERPL CALC-SCNC: 13 MMOL/L — SIGNIFICANT CHANGE UP (ref 7–14)
BASOPHILS # BLD AUTO: 0.04 K/UL — SIGNIFICANT CHANGE UP (ref 0–0.2)
BASOPHILS NFR BLD AUTO: 0.6 % — SIGNIFICANT CHANGE UP (ref 0–1)
BUN SERPL-MCNC: 32 MG/DL — HIGH (ref 10–20)
CALCIUM SERPL-MCNC: 8.6 MG/DL — SIGNIFICANT CHANGE UP (ref 8.5–10.1)
CHLORIDE SERPL-SCNC: 106 MMOL/L — SIGNIFICANT CHANGE UP (ref 98–110)
CO2 SERPL-SCNC: 21 MMOL/L — SIGNIFICANT CHANGE UP (ref 17–32)
CREAT SERPL-MCNC: 1.1 MG/DL — SIGNIFICANT CHANGE UP (ref 0.7–1.5)
EOSINOPHIL # BLD AUTO: 0.18 K/UL — SIGNIFICANT CHANGE UP (ref 0–0.7)
EOSINOPHIL NFR BLD AUTO: 2.8 % — SIGNIFICANT CHANGE UP (ref 0–8)
GLUCOSE BLDC GLUCOMTR-MCNC: 81 MG/DL — SIGNIFICANT CHANGE UP (ref 70–99)
GLUCOSE BLDC GLUCOMTR-MCNC: 88 MG/DL — SIGNIFICANT CHANGE UP (ref 70–99)
GLUCOSE BLDC GLUCOMTR-MCNC: 97 MG/DL — SIGNIFICANT CHANGE UP (ref 70–99)
GLUCOSE SERPL-MCNC: 69 MG/DL — LOW (ref 70–99)
HCT VFR BLD CALC: 32.1 % — LOW (ref 42–52)
HGB BLD-MCNC: 10.5 G/DL — LOW (ref 14–18)
IMM GRANULOCYTES NFR BLD AUTO: 0.5 % — HIGH (ref 0.1–0.3)
LYMPHOCYTES # BLD AUTO: 1.58 K/UL — SIGNIFICANT CHANGE UP (ref 1.2–3.4)
LYMPHOCYTES # BLD AUTO: 24.3 % — SIGNIFICANT CHANGE UP (ref 20.5–51.1)
MCHC RBC-ENTMCNC: 30.4 PG — SIGNIFICANT CHANGE UP (ref 27–31)
MCHC RBC-ENTMCNC: 32.7 G/DL — SIGNIFICANT CHANGE UP (ref 32–37)
MCV RBC AUTO: 93 FL — SIGNIFICANT CHANGE UP (ref 80–94)
MONOCYTES # BLD AUTO: 0.6 K/UL — SIGNIFICANT CHANGE UP (ref 0.1–0.6)
MONOCYTES NFR BLD AUTO: 9.2 % — SIGNIFICANT CHANGE UP (ref 1.7–9.3)
NEUTROPHILS # BLD AUTO: 4.07 K/UL — SIGNIFICANT CHANGE UP (ref 1.4–6.5)
NEUTROPHILS NFR BLD AUTO: 62.6 % — SIGNIFICANT CHANGE UP (ref 42.2–75.2)
NRBC # BLD: 0 /100 WBCS — SIGNIFICANT CHANGE UP (ref 0–0)
PLATELET # BLD AUTO: 96 K/UL — LOW (ref 130–400)
POTASSIUM SERPL-MCNC: 4.5 MMOL/L — SIGNIFICANT CHANGE UP (ref 3.5–5)
POTASSIUM SERPL-SCNC: 4.5 MMOL/L — SIGNIFICANT CHANGE UP (ref 3.5–5)
RBC # BLD: 3.45 M/UL — LOW (ref 4.7–6.1)
RBC # FLD: 13 % — SIGNIFICANT CHANGE UP (ref 11.5–14.5)
SODIUM SERPL-SCNC: 140 MMOL/L — SIGNIFICANT CHANGE UP (ref 135–146)
TROPONIN T SERPL-MCNC: 0.04 NG/ML — CRITICAL HIGH
TROPONIN T SERPL-MCNC: 0.04 NG/ML — CRITICAL HIGH
WBC # BLD: 6.5 K/UL — SIGNIFICANT CHANGE UP (ref 4.8–10.8)
WBC # FLD AUTO: 6.5 K/UL — SIGNIFICANT CHANGE UP (ref 4.8–10.8)

## 2021-08-22 PROCEDURE — 99233 SBSQ HOSP IP/OBS HIGH 50: CPT

## 2021-08-22 PROCEDURE — 93970 EXTREMITY STUDY: CPT | Mod: 26

## 2021-08-22 RX ORDER — METOPROLOL TARTRATE 50 MG
5 TABLET ORAL ONCE
Refills: 0 | Status: COMPLETED | OUTPATIENT
Start: 2021-08-22 | End: 2021-08-22

## 2021-08-22 RX ORDER — PANTOPRAZOLE SODIUM 20 MG/1
40 TABLET, DELAYED RELEASE ORAL
Refills: 0 | Status: DISCONTINUED | OUTPATIENT
Start: 2021-08-22 | End: 2021-08-30

## 2021-08-22 RX ORDER — METOPROLOL TARTRATE 50 MG
50 TABLET ORAL DAILY
Refills: 0 | Status: DISCONTINUED | OUTPATIENT
Start: 2021-08-22 | End: 2021-08-30

## 2021-08-22 RX ADMIN — Medication 5 MILLIGRAM(S): at 20:30

## 2021-08-22 RX ADMIN — CHLORHEXIDINE GLUCONATE 1 APPLICATION(S): 213 SOLUTION TOPICAL at 05:59

## 2021-08-22 RX ADMIN — CARBIDOPA AND LEVODOPA 1 TABLET(S): 25; 100 TABLET ORAL at 05:59

## 2021-08-22 RX ADMIN — Medication 81 MILLIGRAM(S): at 13:14

## 2021-08-22 RX ADMIN — MORPHINE SULFATE 2 MILLIGRAM(S): 50 CAPSULE, EXTENDED RELEASE ORAL at 02:33

## 2021-08-22 RX ADMIN — Medication 325 MILLIGRAM(S): at 05:59

## 2021-08-22 RX ADMIN — ATORVASTATIN CALCIUM 40 MILLIGRAM(S): 80 TABLET, FILM COATED ORAL at 22:25

## 2021-08-22 RX ADMIN — TAMSULOSIN HYDROCHLORIDE 0.4 MILLIGRAM(S): 0.4 CAPSULE ORAL at 22:25

## 2021-08-22 RX ADMIN — Medication 325 MILLIGRAM(S): at 13:14

## 2021-08-22 RX ADMIN — Medication 10 MILLIGRAM(S): at 22:25

## 2021-08-22 RX ADMIN — Medication 1 MILLIGRAM(S): at 13:14

## 2021-08-22 RX ADMIN — Medication 325 MILLIGRAM(S): at 22:25

## 2021-08-22 RX ADMIN — CARBIDOPA AND LEVODOPA 1 TABLET(S): 25; 100 TABLET ORAL at 13:14

## 2021-08-22 RX ADMIN — MORPHINE SULFATE 2 MILLIGRAM(S): 50 CAPSULE, EXTENDED RELEASE ORAL at 03:44

## 2021-08-22 RX ADMIN — MORPHINE SULFATE 2 MILLIGRAM(S): 50 CAPSULE, EXTENDED RELEASE ORAL at 19:22

## 2021-08-22 RX ADMIN — Medication 25 MILLIGRAM(S): at 05:59

## 2021-08-22 RX ADMIN — FINASTERIDE 5 MILLIGRAM(S): 5 TABLET, FILM COATED ORAL at 13:14

## 2021-08-22 RX ADMIN — ENOXAPARIN SODIUM 40 MILLIGRAM(S): 100 INJECTION SUBCUTANEOUS at 13:14

## 2021-08-22 RX ADMIN — MORPHINE SULFATE 2 MILLIGRAM(S): 50 CAPSULE, EXTENDED RELEASE ORAL at 19:49

## 2021-08-22 RX ADMIN — GABAPENTIN 300 MILLIGRAM(S): 400 CAPSULE ORAL at 13:14

## 2021-08-22 RX ADMIN — Medication 5 MILLIGRAM(S): at 02:27

## 2021-08-22 RX ADMIN — POLYETHYLENE GLYCOL 3350 17 GRAM(S): 17 POWDER, FOR SOLUTION ORAL at 17:35

## 2021-08-22 RX ADMIN — CARBIDOPA AND LEVODOPA 1 TABLET(S): 25; 100 TABLET ORAL at 22:25

## 2021-08-22 NOTE — PROGRESS NOTE ADULT - SUBJECTIVE AND OBJECTIVE BOX
ALEXY PURDY 89y Male  MRN#: [id number]   CODE STATUS:________    Hospital Day: 1d    Pt is currently admitted with the primary diagnosis of     SUBJECTIVE  Hospital Course    Overnight events     Subjective complaints     Present Today:   - Cooper:  No [  ], Yes [   ] : Indication:     - Type of IV Access:       .. CVC/Piccline:  No [  ], Yes [   ] : Indication:       .. Midline: No [  ], Yes [   ] : Indication:                                             ----------------------------------------------------------  OBJECTIVE  PAST MEDICAL & SURGICAL HISTORY  Parkinson disease    Coronary artery disease    Chronic atrial fibrillation    Stroke, embolic    Benign prostate hyperplasia    History of iron deficiency    S/P CABG (coronary artery bypass graft)                                              -----------------------------------------------------------  ALLERGIES:  No Known Allergies                                            ------------------------------------------------------------    HOME MEDICATIONS  Home Medications:  aspirin 81 mg oral tablet: 1 tab(s) orally once a day (11 Jun 2020 15:21)  atorvastatin 40 mg oral tablet: 1 tab(s) orally once a day (21 Aug 2021 15:59)  carbidopa-levodopa 25 mg-100 mg oral tablet: 1 tab(s) orally 3 times a day (06 Jun 2020 22:09)  Creon 24,000 units oral delayed release capsule: 1 cap(s) orally 3 times a day (21 Aug 2021 16:07)  Dexilant 60 mg oral delayed release capsule: 1 cap(s) orally once a day (21 Aug 2021 16:06)  dicyclomine 10 mg oral capsule: 1 cap(s) orally once a day, As Needed (11 Jun 2020 14:56)  ferrous sulfate 325 mg (65 mg elemental iron) oral tablet: 1 tab(s) orally 3 times a day (06 Jun 2020 22:09)  finasteride 5 mg oral tablet: 1 tab(s) orally once a day (06 Jun 2020 22:09)  folic acid 1 mg oral tablet: 1 tab(s) orally once a day (06 Jun 2020 22:09)  gabapentin 300 mg oral tablet: 1 tab(s) orally once a day (21 Aug 2021 16:08)  Janumet 50 mg-500 mg oral tablet: 0.5 tab(s) orally once a day, As Needed (06 Jun 2020 22:09)  Metoprolol Succinate ER 25 mg oral tablet, extended release: 0.5 tab(s) orally once a day (21 Aug 2021 16:03)  tamsulosin 0.4 mg oral capsule: 1 cap(s) orally once a day (06 Jun 2020 22:09)  Vitamin D3 1250 mcg (50,000 intl units) oral capsule: 1 cap(s) orally once a week (21 Aug 2021 16:07)                           MEDICATIONS:  STANDING MEDICATIONS  aspirin enteric coated 81 milliGRAM(s) Oral daily  atorvastatin 40 milliGRAM(s) Oral at bedtime  bisacodyl 10 milliGRAM(s) Oral at bedtime  carbidopa/levodopa  25/100 1 Tablet(s) Oral three times a day  chlorhexidine 4% Liquid 1 Application(s) Topical <User Schedule>  enoxaparin Injectable 40 milliGRAM(s) SubCutaneous daily  ferrous    sulfate 325 milliGRAM(s) Oral three times a day  finasteride 5 milliGRAM(s) Oral daily  folic acid 1 milliGRAM(s) Oral daily  gabapentin 300 milliGRAM(s) Oral daily  metoprolol succinate ER 25 milliGRAM(s) Oral daily  pancrelipase  (CREON 12,000 Lipase Units) 2 Capsule(s) Oral three times a day with meals  pantoprazole    Tablet 40 milliGRAM(s) Oral before breakfast  polyethylene glycol 3350 17 Gram(s) Oral two times a day  sodium chloride 0.9%. 1000 milliLiter(s) IV Continuous <Continuous>  tamsulosin 0.4 milliGRAM(s) Oral at bedtime    PRN MEDICATIONS  acetaminophen   Tablet .. 650 milliGRAM(s) Oral every 6 hours PRN  melatonin 3 milliGRAM(s) Oral at bedtime PRN  morphine  - Injectable 2 milliGRAM(s) IV Push every 4 hours PRN  ondansetron Injectable 4 milliGRAM(s) IV Push every 8 hours PRN                                            ------------------------------------------------------------  VITAL SIGNS: Last 24 Hours  T(C): 36.3 (22 Aug 2021 05:42), Max: 36.6 (21 Aug 2021 20:52)  T(F): 97.4 (22 Aug 2021 05:42), Max: 97.9 (21 Aug 2021 20:52)  HR: 76 (22 Aug 2021 05:42) (76 - 126)  BP: 126/59 (22 Aug 2021 05:42) (118/66 - 174/87)  BP(mean): --  RR: 17 (22 Aug 2021 05:42) (17 - 18)  SpO2: 95% (22 Aug 2021 08:20) (95% - 98%)      08-21-21 @ 07:01  -  08-22-21 @ 07:00  --------------------------------------------------------  IN: 0 mL / OUT: 200 mL / NET: -200 mL                                             --------------------------------------------------------------  LABS:                        10.5   6.50  )-----------( 96       ( 22 Aug 2021 05:44 )             32.1     08-22    140  |  106  |  32<H>  ----------------------------<  69<L>  4.5   |  21  |  1.1    Ca    8.6      22 Aug 2021 05:44    TPro  6.7  /  Alb  3.9  /  TBili  0.7  /  DBili  x   /  AST  13  /  ALT  7   /  AlkPhos  90  08-21    PT/INR - ( 21 Aug 2021 15:21 )   PT: 11.90 sec;   INR: 1.04 ratio         PTT - ( 21 Aug 2021 15:21 )  PTT:31.0 sec      Troponin T, Serum: 0.04 ng/mL *HH* (08-22-21 @ 05:44)  Troponin T, Serum: 0.03 ng/mL *HH* (08-21-21 @ 12:30)          CARDIAC MARKERS ( 22 Aug 2021 05:44 )  x     / 0.04 ng/mL / x     / x     / x      CARDIAC MARKERS ( 21 Aug 2021 12:30 )  x     / 0.03 ng/mL / x     / x     / x                                                  -------------------------------------------------------------  RADIOLOGY:                                            --------------------------------------------------------------    PHYSICAL EXAM:  General:   HEENT: NCAT  LUNGS: CTAB, Good air entry bilat  HEART: RRR, +S1,S2, RRR  ABDOMEN: SNTTP, ND x 4 q's  EXT: Warm, well perfused x 4  NEURO: AxOx3, No FND's noted  SKIN: No new breakdown or rashes noted                                           --------------------------------------------------------------    ASSESSMENT & PLAN    Past medical history and hospital course                                                                                                           ----------------------------------------------------  # DVT prophylaxis     # GI prophylaxis     # Diet     # Activity Score (AM-PAC)    # Code status     # Disposition                                                                              --------------------------------------------------------    Fazal Mcdonald      ALEXY PURDY 89y Male  MRN#: [id number]   CODE STATUS:________    Hospital Day: 1d    Pt is currently admitted with the primary diagnosis of     SUBJECTIVE  Hospital Course    Overnight events     Subjective complaints     Present Today:   - Cooper:  No [  ], Yes [   ] : Indication:     - Type of IV Access:       .. CVC/Piccline:  No [  ], Yes [   ] : Indication:       .. Midline: No [  ], Yes [   ] : Indication:                                             ----------------------------------------------------------  OBJECTIVE  PAST MEDICAL & SURGICAL HISTORY  Parkinson disease    Coronary artery disease    Chronic atrial fibrillation    Stroke, embolic    Benign prostate hyperplasia    History of iron deficiency    S/P CABG (coronary artery bypass graft)                                              -----------------------------------------------------------  ALLERGIES:  No Known Allergies                                            ------------------------------------------------------------    HOME MEDICATIONS  Home Medications:  aspirin 81 mg oral tablet: 1 tab(s) orally once a day (11 Jun 2020 15:21)  atorvastatin 40 mg oral tablet: 1 tab(s) orally once a day (21 Aug 2021 15:59)  carbidopa-levodopa 25 mg-100 mg oral tablet: 1 tab(s) orally 3 times a day (06 Jun 2020 22:09)  Creon 24,000 units oral delayed release capsule: 1 cap(s) orally 3 times a day (21 Aug 2021 16:07)  Dexilant 60 mg oral delayed release capsule: 1 cap(s) orally once a day (21 Aug 2021 16:06)  dicyclomine 10 mg oral capsule: 1 cap(s) orally once a day, As Needed (11 Jun 2020 14:56)  ferrous sulfate 325 mg (65 mg elemental iron) oral tablet: 1 tab(s) orally 3 times a day (06 Jun 2020 22:09)  finasteride 5 mg oral tablet: 1 tab(s) orally once a day (06 Jun 2020 22:09)  folic acid 1 mg oral tablet: 1 tab(s) orally once a day (06 Jun 2020 22:09)  gabapentin 300 mg oral tablet: 1 tab(s) orally once a day (21 Aug 2021 16:08)  Janumet 50 mg-500 mg oral tablet: 0.5 tab(s) orally once a day, As Needed (06 Jun 2020 22:09)  Metoprolol Succinate ER 25 mg oral tablet, extended release: 0.5 tab(s) orally once a day (21 Aug 2021 16:03)  tamsulosin 0.4 mg oral capsule: 1 cap(s) orally once a day (06 Jun 2020 22:09)  Vitamin D3 1250 mcg (50,000 intl units) oral capsule: 1 cap(s) orally once a week (21 Aug 2021 16:07)                           MEDICATIONS:  STANDING MEDICATIONS  aspirin enteric coated 81 milliGRAM(s) Oral daily  atorvastatin 40 milliGRAM(s) Oral at bedtime  bisacodyl 10 milliGRAM(s) Oral at bedtime  carbidopa/levodopa  25/100 1 Tablet(s) Oral three times a day  chlorhexidine 4% Liquid 1 Application(s) Topical <User Schedule>  enoxaparin Injectable 40 milliGRAM(s) SubCutaneous daily  ferrous    sulfate 325 milliGRAM(s) Oral three times a day  finasteride 5 milliGRAM(s) Oral daily  folic acid 1 milliGRAM(s) Oral daily  gabapentin 300 milliGRAM(s) Oral daily  metoprolol succinate ER 25 milliGRAM(s) Oral daily  pancrelipase  (CREON 12,000 Lipase Units) 2 Capsule(s) Oral three times a day with meals  pantoprazole    Tablet 40 milliGRAM(s) Oral before breakfast  polyethylene glycol 3350 17 Gram(s) Oral two times a day  sodium chloride 0.9%. 1000 milliLiter(s) IV Continuous <Continuous>  tamsulosin 0.4 milliGRAM(s) Oral at bedtime    PRN MEDICATIONS  acetaminophen   Tablet .. 650 milliGRAM(s) Oral every 6 hours PRN  melatonin 3 milliGRAM(s) Oral at bedtime PRN  morphine  - Injectable 2 milliGRAM(s) IV Push every 4 hours PRN  ondansetron Injectable 4 milliGRAM(s) IV Push every 8 hours PRN                                            ------------------------------------------------------------  VITAL SIGNS: Last 24 Hours  T(C): 36.3 (22 Aug 2021 05:42), Max: 36.6 (21 Aug 2021 20:52)  T(F): 97.4 (22 Aug 2021 05:42), Max: 97.9 (21 Aug 2021 20:52)  HR: 76 (22 Aug 2021 05:42) (76 - 126)  BP: 126/59 (22 Aug 2021 05:42) (118/66 - 174/87)  BP(mean): --  RR: 17 (22 Aug 2021 05:42) (17 - 18)  SpO2: 95% (22 Aug 2021 08:20) (95% - 98%)      08-21-21 @ 07:01  -  08-22-21 @ 07:00  --------------------------------------------------------  IN: 0 mL / OUT: 200 mL / NET: -200 mL                                             --------------------------------------------------------------  LABS:                        10.5   6.50  )-----------( 96       ( 22 Aug 2021 05:44 )             32.1     08-22    140  |  106  |  32<H>  ----------------------------<  69<L>  4.5   |  21  |  1.1    Ca    8.6      22 Aug 2021 05:44    TPro  6.7  /  Alb  3.9  /  TBili  0.7  /  DBili  x   /  AST  13  /  ALT  7   /  AlkPhos  90  08-21    PT/INR - ( 21 Aug 2021 15:21 )   PT: 11.90 sec;   INR: 1.04 ratio         PTT - ( 21 Aug 2021 15:21 )  PTT:31.0 sec      Troponin T, Serum: 0.04 ng/mL *HH* (08-22-21 @ 05:44)  Troponin T, Serum: 0.03 ng/mL *HH* (08-21-21 @ 12:30)          CARDIAC MARKERS ( 22 Aug 2021 05:44 )  x     / 0.04 ng/mL / x     / x     / x      CARDIAC MARKERS ( 21 Aug 2021 12:30 )  x     / 0.03 ng/mL / x     / x     / x                                                  -------------------------------------------------------------  RADIOLOGY:                                            --------------------------------------------------------------    PHYSICAL EXAM:  GENERAL: NAD, well-developed.  HEAD:  Atraumatic, Normocephalic.  EYES: EOMI, PERRLA, conjunctiva and sclera clear.  NECK: Supple, No JVD.  CHEST/LUNG: Clear to auscultation bilaterally; No wheeze.  HEART: Irregular rate and rhythm; S1 S2.   ABDOMEN: Soft, Nontender, Nondistended; Bowel sounds present.  EXTREMITIES:  2+ Peripheral Pulses, No clubbing, cyanosis, or edema.  PSYCH: AAOx1.  NEUROLOGY: non-focal.  SKIN: No rashes or lesions.                                         --------------------------------------------------------------    ASSESSMENT & PLAN  89 yo Male with PMH of HTN, DM2, CAD s/p CABG, CVA and Parkinson's disease was brought to ED for right hip after unwitnessed fall about 2 days before presentation. Was found by his daughter on the floor - was awake and alert when he was found about 30-60 mins after the fall. The patient fell onto his right side and he has been unable to bear weight since due to rt hip pain (aching/sharp, radiating distally, moderate to severe, alleviated by rest and aggravated by movement).    89 year old male patient with past medical history of type 2 DM, CAD s/p CABG, chronic afib, bilateral thromboembolic stroke in 2020, Parkinson's disease, Iron deficiency anemia, folic acid deficiency, benign prostatic hyperplasia, came in for fall this morning with no witnessed LOC    # Fall :  - with no witnessed syncope  - complicated by Acute right intertrochanteric fracture with mild comminution and mild displacement at the greater trochanter.  - ortho team consulted: recommend X ray hip, x ray pelvis, x ray femur and x ray knee  - CT head was negative for any IC pathology   - EKG showed Afib with RVR with no ischemic changes   - TTE ( last EF 53% on june 2020 / no other valvular abnormalities )  - Trops: 0.03 > 0.04 > 0.04   - admit to telemetry   - c/w hydration NS 60 cc / h  - check orthostatic vitals      #Acute right intertrochanteric fracture w/ mild comminution  -CT Acute right intertrochanteric fracture with mild comminution and mild displacement at the greater trochanter.  -Orthopedic recommended NWB of right leg, PT, no surgical intervention at this point.   -MRI can be done outpatient. DVT prophylaxis.       # bilateral thromboembolic stroke:  - in june 2020 most likely secondary to his afib  - patient was supposed to f/u with neurology as OP but he never followed up with them to check if he is a candidate for AC  - does not have any cardiologist   - not on AC now  - c/w aspirin + statins     # Chronic afib:  - increase metoprolol ER 12.5 to 25 mg po od  - can switch to metoprolol tartrate if he is still having HR> 110     # Parkinson's disease   - Continue Carbidopa / levodopa 25/100 TID     # CAD s/p CABG   - Continue Aspirin 81 mg qd + Lipitor 40 mg qhs + metoprolol       # Normocytic Anemia, Iron and Folate deficiency  # Thrombocytopenia   - same at baseline. Hgb 10.5   - thrombocytopenia not investigated previously   - no signs of bleed   - Continue ferrous sulfate and folic acid  - maintain type and screen     # Benign prostatic hyperplasia   - Continue Flomax 0.4 mg qhs + Finasteride 5 mg qd     # GERD  - switch from dexilant to Protonix 40 mg qhs in hospital     # Miscellaneous   - DVT prophylaxis : lovenox    - GI prophylaxis : Protonix 40 mg qd   - Activity : bedrest for now pending ortho eval  - Diet : Dysphagia                    ALEXY PURDY 89y Male  MRN#: [id number]   CODE STATUS:    Hospital Day: 1d    Pt is currently admitted with the primary diagnosis of Fall     SUBJECTIVE    Overnight events: No events     Subjective complaints: He has no complaints. Florianain speaking only. Used .     Present Today:   - Kenneth:  No [  ], Yes [   ] : Indication:     - Type of IV Access:       .. CVC/Piccline:  No [  ], Yes [   ] : Indication:       .. Midline: No [  ], Yes [   ] : Indication:                                             ----------------------------------------------------------  OBJECTIVE  PAST MEDICAL & SURGICAL HISTORY  Parkinson disease    Coronary artery disease    Chronic atrial fibrillation    Stroke, embolic    Benign prostate hyperplasia    History of iron deficiency    S/P CABG (coronary artery bypass graft)                                              -----------------------------------------------------------  ALLERGIES:  No Known Allergies                                            ------------------------------------------------------------    HOME MEDICATIONS  Home Medications:  aspirin 81 mg oral tablet: 1 tab(s) orally once a day (11 Jun 2020 15:21)  atorvastatin 40 mg oral tablet: 1 tab(s) orally once a day (21 Aug 2021 15:59)  carbidopa-levodopa 25 mg-100 mg oral tablet: 1 tab(s) orally 3 times a day (06 Jun 2020 22:09)  Creon 24,000 units oral delayed release capsule: 1 cap(s) orally 3 times a day (21 Aug 2021 16:07)  Dexilant 60 mg oral delayed release capsule: 1 cap(s) orally once a day (21 Aug 2021 16:06)  dicyclomine 10 mg oral capsule: 1 cap(s) orally once a day, As Needed (11 Jun 2020 14:56)  ferrous sulfate 325 mg (65 mg elemental iron) oral tablet: 1 tab(s) orally 3 times a day (06 Jun 2020 22:09)  finasteride 5 mg oral tablet: 1 tab(s) orally once a day (06 Jun 2020 22:09)  folic acid 1 mg oral tablet: 1 tab(s) orally once a day (06 Jun 2020 22:09)  gabapentin 300 mg oral tablet: 1 tab(s) orally once a day (21 Aug 2021 16:08)  Janumet 50 mg-500 mg oral tablet: 0.5 tab(s) orally once a day, As Needed (06 Jun 2020 22:09)  Metoprolol Succinate ER 25 mg oral tablet, extended release: 0.5 tab(s) orally once a day (21 Aug 2021 16:03)  tamsulosin 0.4 mg oral capsule: 1 cap(s) orally once a day (06 Jun 2020 22:09)  Vitamin D3 1250 mcg (50,000 intl units) oral capsule: 1 cap(s) orally once a week (21 Aug 2021 16:07)                           MEDICATIONS:  STANDING MEDICATIONS  aspirin enteric coated 81 milliGRAM(s) Oral daily  atorvastatin 40 milliGRAM(s) Oral at bedtime  bisacodyl 10 milliGRAM(s) Oral at bedtime  carbidopa/levodopa  25/100 1 Tablet(s) Oral three times a day  chlorhexidine 4% Liquid 1 Application(s) Topical <User Schedule>  enoxaparin Injectable 40 milliGRAM(s) SubCutaneous daily  ferrous    sulfate 325 milliGRAM(s) Oral three times a day  finasteride 5 milliGRAM(s) Oral daily  folic acid 1 milliGRAM(s) Oral daily  gabapentin 300 milliGRAM(s) Oral daily  metoprolol succinate ER 25 milliGRAM(s) Oral daily  pancrelipase  (CREON 12,000 Lipase Units) 2 Capsule(s) Oral three times a day with meals  pantoprazole    Tablet 40 milliGRAM(s) Oral before breakfast  polyethylene glycol 3350 17 Gram(s) Oral two times a day  sodium chloride 0.9%. 1000 milliLiter(s) IV Continuous <Continuous>  tamsulosin 0.4 milliGRAM(s) Oral at bedtime    PRN MEDICATIONS  acetaminophen   Tablet .. 650 milliGRAM(s) Oral every 6 hours PRN  melatonin 3 milliGRAM(s) Oral at bedtime PRN  morphine  - Injectable 2 milliGRAM(s) IV Push every 4 hours PRN  ondansetron Injectable 4 milliGRAM(s) IV Push every 8 hours PRN                                            ------------------------------------------------------------  VITAL SIGNS: Last 24 Hours  T(C): 36.3 (22 Aug 2021 05:42), Max: 36.6 (21 Aug 2021 20:52)  T(F): 97.4 (22 Aug 2021 05:42), Max: 97.9 (21 Aug 2021 20:52)  HR: 76 (22 Aug 2021 05:42) (76 - 126)  BP: 126/59 (22 Aug 2021 05:42) (118/66 - 174/87)  BP(mean): --  RR: 17 (22 Aug 2021 05:42) (17 - 18)  SpO2: 95% (22 Aug 2021 08:20) (95% - 98%)      08-21-21 @ 07:01  -  08-22-21 @ 07:00  --------------------------------------------------------  IN: 0 mL / OUT: 200 mL / NET: -200 mL                                             --------------------------------------------------------------  LABS:                        10.5   6.50  )-----------( 96       ( 22 Aug 2021 05:44 )             32.1     08-22    140  |  106  |  32<H>  ----------------------------<  69<L>  4.5   |  21  |  1.1    Ca    8.6      22 Aug 2021 05:44    TPro  6.7  /  Alb  3.9  /  TBili  0.7  /  DBili  x   /  AST  13  /  ALT  7   /  AlkPhos  90  08-21    PT/INR - ( 21 Aug 2021 15:21 )   PT: 11.90 sec;   INR: 1.04 ratio         PTT - ( 21 Aug 2021 15:21 )  PTT:31.0 sec      Troponin T, Serum: 0.04 ng/mL *HH* (08-22-21 @ 05:44)  Troponin T, Serum: 0.03 ng/mL *HH* (08-21-21 @ 12:30)          CARDIAC MARKERS ( 22 Aug 2021 05:44 )  x     / 0.04 ng/mL / x     / x     / x      CARDIAC MARKERS ( 21 Aug 2021 12:30 )  x     / 0.03 ng/mL / x     / x     / x                                                  -------------------------------------------------------------  RADIOLOGY:    EXAM:  CT BRAIN            PROCEDURE DATE:  08/21/2021            INTERPRETATION:  CLINICAL INDICATION: Status post fall. Syncope on ASA.    Technique: CT of the head was performed without contrast.    Multiple contiguous axial images were acquiredfrom the skullbase to the vertex without the administration of intravenous contrast.  Coronal and sagittal reformations were made.    COMPARISON:  prior head CT dated 6/6/2020. MRI brain dated 6/7/2020.    FINDINGS:    The ventricles and sulci are unremarkable in appearance.    There is periventricular white matter hypodensity. There is a chronic lacunar infarct in the left inferior cerebellar hemisphere. There is a chronic lacunar infarct involving the right medial thalamus. There is no intraparenchymal hematoma, mass effect or midline shift. No abnormal extra-axial fluid collections are present.    The calvarium is intact. The visualized intraorbital compartments, paranasal sinuses and mastoid complexes appear free of acute disease.    IMPRESSION:  No acute intracranial pathology. No evidence of midline shift, mass effect or intracranial hemorrhage.    Mild chronic microvascular changes and has chronic infarcts of the left inferior cerebellar hemisphere and right medial thalamus.    ---End of Report ---      EXAM:  CT PELVIS BONY ONLY            PROCEDURE DATE:  08/21/2021            INTERPRETATION:  Clinical History / Reason for exam: Post fall with hip pain    TECHNIQUE: Multiaxial CT images of the bony pelvis without intravenous contrast were obtained. 2-D coronal and sagittal reformatted images were generated.    COMPARISON: None    FINDINGS:    Diffuse osteopenia. There is acute right greater trochanteric fracture with mild comminution and displacement, demonstrating extension into at least proximal half of the intertrochanteric ridge (series 9 image 141), consistent with intertrochanteric fracture.    There are moderate degenerative changes of the bilateral hips, sacroiliac joints and visualized lower lumbar spine with trace anterolisthesis of L5 on S1.    Extensive vascular calcifications are noted. Prostatomegaly measuring 5.8 cm transverse.    No evidence of soft tissue hematoma.    IMPRESSION: Acute right intertrochanteric fracture with mild comminution and mild displacement at the greater trochanter.    --- End of Report ---              FELICE RODGERS MD; Attending Radiologist  This document has been electronically signed. Aug 21 2021  1:49PM                                            --------------------------------------------------------------    PHYSICAL EXAM:  GENERAL: NAD, well-developed.  HEAD:  Atraumatic, Normocephalic.  EYES: EOMI, PERRLA, conjunctiva and sclera clear.  NECK: Supple, No JVD.  CHEST/LUNG: Clear to auscultation bilaterally; No wheeze.  HEART: Irregular rate and rhythm; S1 S2.   ABDOMEN: Soft, Nontender, Nondistended; Bowel sounds present.  EXTREMITIES:  2+ Peripheral Pulses, No clubbing, cyanosis, or edema.  PSYCH: AAOx1.  NEUROLOGY: non-focal.  SKIN: No rashes or lesions.                                         --------------------------------------------------------------    ASSESSMENT & PLAN  89 yo Male with PMH of HTN, DM2, CAD s/p CABG, CVA and Parkinson's disease was brought to ED for right hip after unwitnessed fall about 2 days before presentation. Was found by his daughter on the floor - was awake and alert when he was found about 30-60 mins after the fall. The patient fell onto his right side and he has been unable to bear weight since due to rt hip pain (aching/sharp, radiating distally, moderate to severe, alleviated by rest and aggravated by movement).    89 year old male patient with past medical history of type 2 DM, CAD s/p CABG, chronic afib, bilateral thromboembolic stroke in 2020, Parkinson's disease, Iron deficiency anemia, folic acid deficiency, benign prostatic hyperplasia, came in for fall this morning with no witnessed LOC    # Fall :  - with no witnessed syncope  - complicated by Acute right intertrochanteric fracture with mild comminution and mild displacement at the greater trochanter.  - ortho team consulted: recommend X ray hip, x ray pelvis, x ray femur and x ray knee  - CT head was negative for any IC pathology   - EKG showed Afib with RVR with no ischemic changes   - TTE ( last EF 53% on june 2020 / no other valvular abnormalities )  - Trops: 0.03 > 0.04 > 0.04   - admit to telemetry   - c/w hydration NS 60 cc / h  - check orthostatic vitals      #Acute right intertrochanteric fracture w/ mild comminution  -CT Acute right intertrochanteric fracture with mild comminution and mild displacement at the greater trochanter.  -Orthopedic recommended NWB of right leg, PT, no surgical intervention at this point.   -MRI can be done outpatient. DVT prophylaxis.       # bilateral thromboembolic stroke:  - in june 2020 most likely secondary to his afib  - patient was supposed to f/u with neurology as OP but he never followed up with them to check if he is a candidate for AC  - does not have any cardiologist   - not on AC now  - c/w aspirin + statins     # Chronic afib:  - increase metoprolol ER 12.5 to 25 mg po od  - can switch to metoprolol tartrate if he is still having HR> 110     # Parkinson's disease   - Continue Carbidopa / levodopa 25/100 TID     # CAD s/p CABG   - Continue Aspirin 81 mg qd + Lipitor 40 mg qhs + metoprolol       # Normocytic Anemia, Iron and Folate deficiency  # Thrombocytopenia   - same at baseline. Hgb 10.5   - thrombocytopenia not investigated previously   - no signs of bleed   - Continue ferrous sulfate and folic acid  - maintain type and screen     # Benign prostatic hyperplasia   - Continue Flomax 0.4 mg qhs + Finasteride 5 mg qd     # GERD  - switch from dexilant to Protonix 40 mg qhs in hospital     # Miscellaneous   - DVT prophylaxis : lovenox    - GI prophylaxis : Protonix 40 mg qd   - Activity : bedrest for now pending ortho eval  - Diet : Dysphagia

## 2021-08-22 NOTE — PROGRESS NOTE ADULT - ASSESSMENT
89 yo Male with PMH of HTN, DM2, CAD s/p CABG, CVA and Parkinson's disease was brought to ED for right hip after unwitnessed fall about 2 days before presentation. Was found by his daughter on the floor - was awake and alert when he was found about 30-60 mins after the fall. The patient fell onto his right side and he has been unable to bear weight since due to rt hip pain (aching/sharp, radiating distally, moderate to severe, alleviated by rest and aggravated by movement).    A/P:   Unwitnessed Fall on 8/19  Patient was found on the floor, awake after 30 minutes, possible Syncope.   EKG  Head CT showed no acute pathology, Mild chronic microvascular changes and has chronic infarcts of the left inferior cerebellar hemisphere and right medial thalamus.  Continue telemetry.     Acute right intertrochanteric fracture w/ mild comminution  CT Acute right intertrochanteric fracture with mild comminution and mild displacement at the greater trochanter.  Orthopedic recommended NWB of right leg, PT, no surgical intervention at this point.   MRI can be done outpatient. DVT prophylaxis.     Chronic Atrial Fibrillation/Flutter with RVR:   ([id number] 6 for age.  Continue Metoprolol, will increase to 50mg daily to control the rate.     CAD s/p CABG: Continue ASA, Metoprolol and Lipitor.   History of CVA:   Embolic in june 2020. Not on anticoagulation.     DM type 2 (BG < 140 mg/dL thus far)  CKD 3 - stable renal function    Normocytic anemia & thrombocytopenia  Iron and folate deficiency  Continue ferrous sulfate and folic acid.     Parkinson's disease: on Sinemet.   BPH - no acute complaints, on Tamsulosin and Finasteride.     #Progress Note Handoff:  Pending (specify): PT, telemetry monitor placement.   Family discussion:  Disposition: Home vs STR.

## 2021-08-22 NOTE — PROGRESS NOTE ADULT - SUBJECTIVE AND OBJECTIVE BOX
GURWINDERALEXY  89y  Male      Patient is a 89y old  Male who presents with a chief complaint of fall (22 Aug 2021 09:33)      INTERVAL HPI/OVERNIGHT EVENTS:  He is confused, no hip pain today.   Vital Signs Last 24 Hrs  T(C): 36.3 (22 Aug 2021 05:42), Max: 36.6 (21 Aug 2021 20:52)  T(F): 97.4 (22 Aug 2021 05:42), Max: 97.9 (21 Aug 2021 20:52)  HR: 76 (22 Aug 2021 05:42) (76 - 126)  BP: 126/59 (22 Aug 2021 05:42) (118/66 - 174/87)  BP(mean): --  RR: 17 (22 Aug 2021 05:42) (17 - 18)  SpO2: 95% (22 Aug 2021 08:20) (95% - 97%)      08-21-21 @ 07:01  -  08-22-21 @ 07:00  --------------------------------------------------------  IN: 0 mL / OUT: 200 mL / NET: -200 mL            Consultant(s) Notes Reviewed:  [x ] YES  [ ] NO          MEDICATIONS  (STANDING):  aspirin enteric coated 81 milliGRAM(s) Oral daily  atorvastatin 40 milliGRAM(s) Oral at bedtime  bisacodyl 10 milliGRAM(s) Oral at bedtime  carbidopa/levodopa  25/100 1 Tablet(s) Oral three times a day  chlorhexidine 4% Liquid 1 Application(s) Topical <User Schedule>  enoxaparin Injectable 40 milliGRAM(s) SubCutaneous daily  ferrous    sulfate 325 milliGRAM(s) Oral three times a day  finasteride 5 milliGRAM(s) Oral daily  folic acid 1 milliGRAM(s) Oral daily  gabapentin 300 milliGRAM(s) Oral daily  metoprolol succinate ER 25 milliGRAM(s) Oral daily  pancrelipase  (CREON 12,000 Lipase Units) 2 Capsule(s) Oral three times a day with meals  pantoprazole    Tablet 40 milliGRAM(s) Oral before breakfast  polyethylene glycol 3350 17 Gram(s) Oral two times a day  sodium chloride 0.9%. 1000 milliLiter(s) (60 mL/Hr) IV Continuous <Continuous>  tamsulosin 0.4 milliGRAM(s) Oral at bedtime    MEDICATIONS  (PRN):  acetaminophen   Tablet .. 650 milliGRAM(s) Oral every 6 hours PRN Temp greater or equal to 38.5C (101.3F), Mild Pain (1 - 3)  melatonin 3 milliGRAM(s) Oral at bedtime PRN Insomnia  morphine  - Injectable 2 milliGRAM(s) IV Push every 4 hours PRN Severe Pain (7 - 10)  ondansetron Injectable 4 milliGRAM(s) IV Push every 8 hours PRN Nausea and/or Vomiting      LABS                          10.5   6.50  )-----------( 96       ( 22 Aug 2021 05:44 )             32.1     08-22    140  |  106  |  32<H>  ----------------------------<  69<L>  4.5   |  21  |  1.1    Ca    8.6      22 Aug 2021 05:44    TPro  6.7  /  Alb  3.9  /  TBili  0.7  /  DBili  x   /  AST  13  /  ALT  7   /  AlkPhos  90  08-21        PT/INR - ( 21 Aug 2021 15:21 )   PT: 11.90 sec;   INR: 1.04 ratio         PTT - ( 21 Aug 2021 15:21 )  PTT:31.0 sec  Lactate Trend    CARDIAC MARKERS ( 22 Aug 2021 05:44 )  x     / 0.04 ng/mL / x     / x     / x      CARDIAC MARKERS ( 21 Aug 2021 12:30 )  x     / 0.03 ng/mL / x     / x     / x          CAPILLARY BLOOD GLUCOSE      POCT Blood Glucose.: 81 mg/dL (22 Aug 2021 11:56)        RADIOLOGY & ADDITIONAL TESTS:    Imaging Personally Reviewed:  [ ] YES  [ ] NO    HEALTH ISSUES - PROBLEM Dx:  Dehydration            PHYSICAL EXAM:  GENERAL: NAD, well-developed.  HEAD:  Atraumatic, Normocephalic.  EYES: EOMI, PERRLA, conjunctiva and sclera clear.  NECK: Supple, No JVD.  CHEST/LUNG: Clear to auscultation bilaterally; No wheeze.  HEART: Irregular rate and rhythm; S1 S2.   ABDOMEN: Soft, Nontender, Nondistended; Bowel sounds present.  EXTREMITIES:  2+ Peripheral Pulses, No clubbing, cyanosis, or edema.  PSYCH: AAOx1.  NEUROLOGY: non-focal.  SKIN: No rashes or lesions.

## 2021-08-22 NOTE — SWALLOW BEDSIDE ASSESSMENT ADULT - COMMENTS
pt known to acute service with recs: Dysphagia Diet II mechanical soft consistency with ground meat, thins. pt received alert, on RA. +generally weak. +tremulous. +dysphonic moderate oral dysphagia +cough post intake

## 2021-08-23 LAB
ALBUMIN SERPL ELPH-MCNC: 3.5 G/DL — SIGNIFICANT CHANGE UP (ref 3.5–5.2)
ALP SERPL-CCNC: 83 U/L — SIGNIFICANT CHANGE UP (ref 30–115)
ALT FLD-CCNC: <5 U/L — SIGNIFICANT CHANGE UP (ref 0–41)
ANION GAP SERPL CALC-SCNC: 10 MMOL/L — SIGNIFICANT CHANGE UP (ref 7–14)
APPEARANCE UR: CLEAR — SIGNIFICANT CHANGE UP
AST SERPL-CCNC: 13 U/L — SIGNIFICANT CHANGE UP (ref 0–41)
BASOPHILS # BLD AUTO: 0.02 K/UL — SIGNIFICANT CHANGE UP (ref 0–0.2)
BASOPHILS NFR BLD AUTO: 0.3 % — SIGNIFICANT CHANGE UP (ref 0–1)
BILIRUB SERPL-MCNC: 0.5 MG/DL — SIGNIFICANT CHANGE UP (ref 0.2–1.2)
BILIRUB UR-MCNC: NEGATIVE — SIGNIFICANT CHANGE UP
BUN SERPL-MCNC: 30 MG/DL — HIGH (ref 10–20)
CALCIUM SERPL-MCNC: 8.6 MG/DL — SIGNIFICANT CHANGE UP (ref 8.5–10.1)
CHLORIDE SERPL-SCNC: 105 MMOL/L — SIGNIFICANT CHANGE UP (ref 98–110)
CO2 SERPL-SCNC: 23 MMOL/L — SIGNIFICANT CHANGE UP (ref 17–32)
COLOR SPEC: YELLOW — SIGNIFICANT CHANGE UP
COVID-19 SPIKE DOMAIN AB INTERP: NEGATIVE — SIGNIFICANT CHANGE UP
COVID-19 SPIKE DOMAIN ANTIBODY RESULT: 0.4 U/ML — SIGNIFICANT CHANGE UP
CREAT SERPL-MCNC: 1 MG/DL — SIGNIFICANT CHANGE UP (ref 0.7–1.5)
DIFF PNL FLD: ABNORMAL
EOSINOPHIL # BLD AUTO: 0.17 K/UL — SIGNIFICANT CHANGE UP (ref 0–0.7)
EOSINOPHIL NFR BLD AUTO: 2.8 % — SIGNIFICANT CHANGE UP (ref 0–8)
GLUCOSE BLDC GLUCOMTR-MCNC: 103 MG/DL — HIGH (ref 70–99)
GLUCOSE BLDC GLUCOMTR-MCNC: 112 MG/DL — HIGH (ref 70–99)
GLUCOSE BLDC GLUCOMTR-MCNC: 140 MG/DL — HIGH (ref 70–99)
GLUCOSE BLDC GLUCOMTR-MCNC: 144 MG/DL — HIGH (ref 70–99)
GLUCOSE SERPL-MCNC: 99 MG/DL — SIGNIFICANT CHANGE UP (ref 70–99)
GLUCOSE UR QL: SIGNIFICANT CHANGE UP
HCT VFR BLD CALC: 33.1 % — LOW (ref 42–52)
HGB BLD-MCNC: 10.7 G/DL — LOW (ref 14–18)
IMM GRANULOCYTES NFR BLD AUTO: 0.5 % — HIGH (ref 0.1–0.3)
KETONES UR-MCNC: NEGATIVE — SIGNIFICANT CHANGE UP
LEUKOCYTE ESTERASE UR-ACNC: NEGATIVE — SIGNIFICANT CHANGE UP
LYMPHOCYTES # BLD AUTO: 1.18 K/UL — LOW (ref 1.2–3.4)
LYMPHOCYTES # BLD AUTO: 19.6 % — LOW (ref 20.5–51.1)
MAGNESIUM SERPL-MCNC: 1.9 MG/DL — SIGNIFICANT CHANGE UP (ref 1.8–2.4)
MCHC RBC-ENTMCNC: 30 PG — SIGNIFICANT CHANGE UP (ref 27–31)
MCHC RBC-ENTMCNC: 32.3 G/DL — SIGNIFICANT CHANGE UP (ref 32–37)
MCV RBC AUTO: 92.7 FL — SIGNIFICANT CHANGE UP (ref 80–94)
MONOCYTES # BLD AUTO: 0.57 K/UL — SIGNIFICANT CHANGE UP (ref 0.1–0.6)
MONOCYTES NFR BLD AUTO: 9.5 % — HIGH (ref 1.7–9.3)
NEUTROPHILS # BLD AUTO: 4.04 K/UL — SIGNIFICANT CHANGE UP (ref 1.4–6.5)
NEUTROPHILS NFR BLD AUTO: 67.3 % — SIGNIFICANT CHANGE UP (ref 42.2–75.2)
NITRITE UR-MCNC: NEGATIVE — SIGNIFICANT CHANGE UP
NRBC # BLD: 0 /100 WBCS — SIGNIFICANT CHANGE UP (ref 0–0)
PH UR: 5.5 — SIGNIFICANT CHANGE UP (ref 5–8)
PLATELET # BLD AUTO: 97 K/UL — LOW (ref 130–400)
POTASSIUM SERPL-MCNC: 4.4 MMOL/L — SIGNIFICANT CHANGE UP (ref 3.5–5)
POTASSIUM SERPL-SCNC: 4.4 MMOL/L — SIGNIFICANT CHANGE UP (ref 3.5–5)
PROT SERPL-MCNC: 5.9 G/DL — LOW (ref 6–8)
PROT UR-MCNC: SIGNIFICANT CHANGE UP
RBC # BLD: 3.57 M/UL — LOW (ref 4.7–6.1)
RBC # FLD: 12.9 % — SIGNIFICANT CHANGE UP (ref 11.5–14.5)
SARS-COV-2 IGG+IGM SERPL QL IA: 0.4 U/ML — SIGNIFICANT CHANGE UP
SARS-COV-2 IGG+IGM SERPL QL IA: NEGATIVE — SIGNIFICANT CHANGE UP
SODIUM SERPL-SCNC: 138 MMOL/L — SIGNIFICANT CHANGE UP (ref 135–146)
SP GR SPEC: 1.02 — SIGNIFICANT CHANGE UP (ref 1.01–1.03)
TSH SERPL-MCNC: 1.4 UIU/ML — SIGNIFICANT CHANGE UP (ref 0.27–4.2)
UROBILINOGEN FLD QL: SIGNIFICANT CHANGE UP
WBC # BLD: 6.01 K/UL — SIGNIFICANT CHANGE UP (ref 4.8–10.8)
WBC # FLD AUTO: 6.01 K/UL — SIGNIFICANT CHANGE UP (ref 4.8–10.8)

## 2021-08-23 PROCEDURE — 99233 SBSQ HOSP IP/OBS HIGH 50: CPT

## 2021-08-23 RX ORDER — METOPROLOL TARTRATE 50 MG
5 TABLET ORAL ONCE
Refills: 0 | Status: COMPLETED | OUTPATIENT
Start: 2021-08-23 | End: 2021-08-23

## 2021-08-23 RX ADMIN — POLYETHYLENE GLYCOL 3350 17 GRAM(S): 17 POWDER, FOR SOLUTION ORAL at 06:18

## 2021-08-23 RX ADMIN — TAMSULOSIN HYDROCHLORIDE 0.4 MILLIGRAM(S): 0.4 CAPSULE ORAL at 22:27

## 2021-08-23 RX ADMIN — FINASTERIDE 5 MILLIGRAM(S): 5 TABLET, FILM COATED ORAL at 12:18

## 2021-08-23 RX ADMIN — PANTOPRAZOLE SODIUM 40 MILLIGRAM(S): 20 TABLET, DELAYED RELEASE ORAL at 06:18

## 2021-08-23 RX ADMIN — CARBIDOPA AND LEVODOPA 1 TABLET(S): 25; 100 TABLET ORAL at 13:39

## 2021-08-23 RX ADMIN — Medication 2 CAPSULE(S): at 08:27

## 2021-08-23 RX ADMIN — Medication 50 MILLIGRAM(S): at 06:19

## 2021-08-23 RX ADMIN — Medication 2 CAPSULE(S): at 17:42

## 2021-08-23 RX ADMIN — POLYETHYLENE GLYCOL 3350 17 GRAM(S): 17 POWDER, FOR SOLUTION ORAL at 17:42

## 2021-08-23 RX ADMIN — Medication 2 CAPSULE(S): at 12:19

## 2021-08-23 RX ADMIN — ATORVASTATIN CALCIUM 40 MILLIGRAM(S): 80 TABLET, FILM COATED ORAL at 22:27

## 2021-08-23 RX ADMIN — ENOXAPARIN SODIUM 40 MILLIGRAM(S): 100 INJECTION SUBCUTANEOUS at 12:19

## 2021-08-23 RX ADMIN — Medication 325 MILLIGRAM(S): at 06:19

## 2021-08-23 RX ADMIN — Medication 10 MILLIGRAM(S): at 22:27

## 2021-08-23 RX ADMIN — CARBIDOPA AND LEVODOPA 1 TABLET(S): 25; 100 TABLET ORAL at 22:27

## 2021-08-23 RX ADMIN — Medication 325 MILLIGRAM(S): at 13:39

## 2021-08-23 RX ADMIN — CARBIDOPA AND LEVODOPA 1 TABLET(S): 25; 100 TABLET ORAL at 06:19

## 2021-08-23 RX ADMIN — Medication 325 MILLIGRAM(S): at 22:27

## 2021-08-23 RX ADMIN — Medication 1 MILLIGRAM(S): at 12:18

## 2021-08-23 RX ADMIN — CHLORHEXIDINE GLUCONATE 1 APPLICATION(S): 213 SOLUTION TOPICAL at 06:19

## 2021-08-23 RX ADMIN — Medication 81 MILLIGRAM(S): at 12:18

## 2021-08-23 RX ADMIN — GABAPENTIN 300 MILLIGRAM(S): 400 CAPSULE ORAL at 12:18

## 2021-08-23 NOTE — PROGRESS NOTE ADULT - ASSESSMENT
89 yo Male with PMH of HTN, DM2, CAD s/p CABG, CVA and Parkinson's disease was brought to ED for right hip after unwitnessed fall about 2 days before presentation. Was found by his daughter on the floor - was awake and alert when he was found about 30-60 mins after the fall. The patient fell onto his right side and he has been unable to bear weight since due to rt hip pain (aching/sharp, radiating distally, moderate to severe, alleviated by rest and aggravated by movement).    A/P:   Unwitnessed Fall on 8/19  Patient was found on the floor, awake after 30 minutes, possible Syncope.   EKG showed atrial fibrillation, LBBB, old.   Head CT showed no acute pathology, Mild chronic microvascular changes and has chronic infarcts of the left inferior cerebellar hemisphere and right medial thalamus.  Continue telemetry.     Acute right intertrochanteric fracture w/ mild comminution  CT Acute right intertrochanteric fracture with mild comminution and mild displacement at the greater trochanter.  Orthopedic recommended NWB of right leg, PT, no surgical intervention at this point.   MRI can be done outpatient. DVT prophylaxis.     Chronic Atrial Fibrillation/Flutter with RVR:   [id number]  Continue Metoprolol, will increase to 50mg daily to control the rate.     CAD s/p CABG: Continue ASA, Metoprolol and Lipitor.   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 Not on anticoagulation.     DM type 2 (BG < 140 mg/dL thus far)  CKD 3 - stable renal function    Normocytic anemia & thrombocytopenia  Iron and folate deficiency  Continue ferrous sulfate and folic acid.     Parkinson's disease: on Sinemet.   BPH: on Tamsulosin and Finasteride.     #Progress Note Handoff:  Pending (specify): PT, telemetry monitor placement.   Family discussion:  Disposition: Home vs STR.

## 2021-08-23 NOTE — PHYSICAL THERAPY INITIAL EVALUATION ADULT - PERTINENT HX OF CURRENT PROBLEM, REHAB EVAL
89 year old male patient with past medical history of type 2 DM, CAD s/p CABG, chronic afib, bilateral thromboembolic stroke in 2020, Parkinson's disease, Iron deficiency anemia, folic acid deficiency, benign prostatic hyperplasia, came in for fall this morning. she reports that his daughter found him on the floor in the bathroom. fall happened when the patient was trying to get out of bed and was going to the bathroom while using his rolling walker

## 2021-08-23 NOTE — PROGRESS NOTE ADULT - SUBJECTIVE AND OBJECTIVE BOX
GURWINDERALEXY HARRELL 89y Male      Patient is a 89y old  Male who presents with a chief complaint of fall (23 Aug 2021 12:56)        INTERVAL HPI/ OVERNIGHT EVENTS: Tachycardiac overnight.     PHYSICAL EXAM:  GENERAL: NAD  HEAD:  Normocephalic  EYES:  conjunctiva and sclera clear  ENMT: Moist mucous membranes  NECK: Supple  NERVOUS SYSTEM:  Alert, awake  CHEST/LUNG: Good air exchange bilaterally, no wheeze  HEART: Regular rate and rhythm        Vital Signs Last 24 Hrs  T(C): 36.2 (23 Aug 2021 13:15), Max: 36.4 (22 Aug 2021 20:44)  T(F): 97.2 (23 Aug 2021 13:15), Max: 97.5 (22 Aug 2021 20:44)  HR: 80 (23 Aug 2021 13:15) (52 - 149)  BP: 106/58 (23 Aug 2021 13:15) (106/58 - 138/61)  BP(mean): --  RR: 18 (23 Aug 2021 13:15) (18 - 19)  SpO2: --      Consultant(s) Notes Reviewed:  [X] YES  [ ] NO  Care Discussed with Consultants/Other Providers [X] YES  [ ] NO  Imaging Personally Reviewed:  [X] YES  [ ] NO      LABS:                        10.7   6.01  )-----------( 97       ( 23 Aug 2021 04:30 )             33.1     08-23    138  |  105  |  30<H>  ----------------------------<  99  4.4   |  23  |  1.0    Ca    8.6      23 Aug 2021 04:30  Mg     1.9     08-23    TPro  5.9<L>  /  Alb  3.5  /  TBili  0.5  /  DBili  x   /  AST  13  /  ALT  <5  /  AlkPhos  83  08-23    PT/INR - ( 21 Aug 2021 15:21 )   PT: 11.90 sec;   INR: 1.04 ratio         PTT - ( 21 Aug 2021 15:21 )  PTT:31.0 sec      CAPILLARY BLOOD GLUCOSE      POCT Blood Glucose.: 112 mg/dL (23 Aug 2021 11:19)  POCT Blood Glucose.: 103 mg/dL (23 Aug 2021 07:39)  POCT Blood Glucose.: 97 mg/dL (22 Aug 2021 21:39)

## 2021-08-23 NOTE — PROGRESS NOTE ADULT - SUBJECTIVE AND OBJECTIVE BOX
PT GIVEN 8 UNITS HUMALOG COVERAGE AS WELL AS ORDERED COLACE FOR STOOL SOFTENER  AND HEPARIN 
SHOT FOR DVT PREVENTION. EDUCATION REGARDING MEDICATION PROVIDED AT BEDSIDE, INCLUDING SIDE 
EFFECTS, PT VERBALIZED UNDERSTANDING. PT GIVEN REQUESTED DIABETIC SNACK. WILL CONTINUE TO 
MONITOR BLOOD SUGAR. ALL REQUESTED NEEDS ATTENDED AND ALL UNIVERSAL FALLS PRECAUTIONS IN 
PLACE.   GURWINDERALEXY HARRELL  89y  Male      Patient is a 89y old  Male who presents with a chief complaint of fall (22 Aug 2021 12:52)      INTERVAL HPI/OVERNIGHT EVENTS:  He is confused, no pain, heart rate is not controlled overnight.   Vital Signs Last 24 Hrs  T(C): 36.1 (23 Aug 2021 05:14), Max: 36.4 (22 Aug 2021 20:44)  T(F): 97 (23 Aug 2021 05:14), Max: 97.5 (22 Aug 2021 20:44)  HR: 79 (23 Aug 2021 06:12) (52 - 149)  BP: 138/61 (23 Aug 2021 05:14) (128/60 - 138/61)  BP(mean): 58 (22 Aug 2021 13:18) (58 - 58)  RR: 19 (23 Aug 2021 05:14) (18 - 19)  SpO2: --      08-22-21 @ 07:01  -  08-23-21 @ 07:00  --------------------------------------------------------  IN: 1070 mL / OUT: 1050 mL / NET: 20 mL    08-23-21 @ 07:01  -  08-23-21 @ 13:02  --------------------------------------------------------  IN: 120 mL / OUT: 0 mL / NET: 120 mL            Consultant(s) Notes Reviewed:  [x ] YES  [ ] NO          MEDICATIONS  (STANDING):  aspirin enteric coated 81 milliGRAM(s) Oral daily  atorvastatin 40 milliGRAM(s) Oral at bedtime  bisacodyl 10 milliGRAM(s) Oral at bedtime  carbidopa/levodopa  25/100 1 Tablet(s) Oral three times a day  chlorhexidine 4% Liquid 1 Application(s) Topical <User Schedule>  enoxaparin Injectable 40 milliGRAM(s) SubCutaneous daily  ferrous    sulfate 325 milliGRAM(s) Oral three times a day  finasteride 5 milliGRAM(s) Oral daily  folic acid 1 milliGRAM(s) Oral daily  gabapentin 300 milliGRAM(s) Oral daily  metoprolol succinate ER 50 milliGRAM(s) Oral daily  pancrelipase  (CREON 12,000 Lipase Units) 2 Capsule(s) Oral three times a day with meals  pantoprazole    Tablet 40 milliGRAM(s) Oral before breakfast  polyethylene glycol 3350 17 Gram(s) Oral two times a day  sodium chloride 0.9%. 1000 milliLiter(s) (60 mL/Hr) IV Continuous <Continuous>  tamsulosin 0.4 milliGRAM(s) Oral at bedtime    MEDICATIONS  (PRN):  acetaminophen   Tablet .. 650 milliGRAM(s) Oral every 6 hours PRN Temp greater or equal to 38.5C (101.3F), Mild Pain (1 - 3)  melatonin 3 milliGRAM(s) Oral at bedtime PRN Insomnia  morphine  - Injectable 2 milliGRAM(s) IV Push every 4 hours PRN Severe Pain (7 - 10)  ondansetron Injectable 4 milliGRAM(s) IV Push every 8 hours PRN Nausea and/or Vomiting      LABS                          10.7   6.01  )-----------( 97       ( 23 Aug 2021 04:30 )             33.1     08-23    138  |  105  |  30<H>  ----------------------------<  99  4.4   |  23  |  1.0    Ca    8.6      23 Aug 2021 04:30  Mg     1.9     08-23    TPro  5.9<L>  /  Alb  3.5  /  TBili  0.5  /  DBili  x   /  AST  13  /  ALT  <5  /  AlkPhos  83  08-23        PT/INR - ( 21 Aug 2021 15:21 )   PT: 11.90 sec;   INR: 1.04 ratio         PTT - ( 21 Aug 2021 15:21 )  PTT:31.0 sec  Lactate Trend    CARDIAC MARKERS ( 22 Aug 2021 11:59 )  x     / 0.04 ng/mL / x     / x     / x      CARDIAC MARKERS ( 22 Aug 2021 05:44 )  x     / 0.04 ng/mL / x     / x     / x          CAPILLARY BLOOD GLUCOSE      POCT Blood Glucose.: 112 mg/dL (23 Aug 2021 11:19)        RADIOLOGY & ADDITIONAL TESTS:    Imaging Personally Reviewed:  [ ] YES  [ ] NO    HEALTH ISSUES - PROBLEM Dx:  Dehydration            PHYSICAL EXAM:  GENERAL: NAD, well-developed.  HEAD:  Atraumatic, Normocephalic.  EYES: EOMI, PERRLA, conjunctiva and sclera clear.  NECK: Supple, No JVD.  CHEST/LUNG: Clear to auscultation bilaterally; No wheeze.  HEART: Irregular rate and rhythm; S1 S2.   ABDOMEN: Soft, Nontender, Nondistended; Bowel sounds present.  EXTREMITIES:  2+ Peripheral Pulses, No clubbing, cyanosis, or edema.  PSYCH: AAOx1.  NEUROLOGY: non-focal.  SKIN: No rashes or lesions.

## 2021-08-23 NOTE — PHYSICAL THERAPY INITIAL EVALUATION ADULT - GENERAL OBSERVATIONS, REHAB EVAL
Pt. encountered alert and NAD, supine in bed (+)Cooper cath (+)tele. Pt. is Swazi speaking,  # [id number] used for entire session. Pt. left as found, supine in bed (+)alarms (+)call bell in reach (+)Cooper (+)tele, NAD

## 2021-08-23 NOTE — PHYSICAL THERAPY INITIAL EVALUATION ADULT - ADDITIONAL COMMENTS
Pt. reports he was previously able to ambulate using a rolling walker without assistance. Pt. reports he has an aide at home that was able to help him with ADLs and IADLS

## 2021-08-23 NOTE — PROGRESS NOTE ADULT - ASSESSMENT
89 year old male patient with past medical history of type 2 DM, CAD s/p CABG, chronic afib, bilateral thromboembolic stroke in 2020, Parkinson's disease, Iron deficiency anemia, folic acid deficiency, benign prostatic hyperplasia, came in for fall about 2 days before presentation. Was found by his daughter on the floor - was awake and alert when he was found about 30-60 mins after the fall. The patient fell onto his right side and he has been unable to bear weight since due to rt hip pain (aching/sharp, radiating distally, moderate to severe, alleviated by rest and aggravated by movement).      # Unwitnessed Fall :  - CT head was negative for any IC pathology   - EKG showed Afib with RVR, LBBB old   - TTE ( last EF 53% on june 2020 / no other valvular abnormalities ), repeat TTE ordered   - Trops: 0.03 > 0.04 > 0.04     #Acute right intertrochanteric fracture w/ mild comminution  -CT Acute right intertrochanteric fracture with mild comminution and mild displacement at the greater trochanter.  -Orthopedic recommended NWB of right leg, PT, no surgical intervention at this point.   -MRI can be done outpatient. DVT prophylaxis.       # bilateral thromboembolic stroke:  - in june 2020 most likely secondary to his afib  - patient was supposed to f/u with neurology as OP but he never followed up with them to check if he is a candidate for AC  - does not have any cardiologist   - not on AC now  - c/w aspirin + statins     # Chronic afib:  - increase metoprolol ER 12.5 to 25 mg po od  - can switch to metoprolol tartrate if he is still having HR> 110     # Parkinson's disease   - Continue Carbidopa / levodopa 25/100 TID     # CAD s/p CABG   - Continue Aspirin 81 mg qd + Lipitor 40 mg qhs + metoprolol       # Normocytic Anemia, Iron and Folate deficiency  # Thrombocytopenia   - same at baseline. Hgb 10.5   - thrombocytopenia not investigated previously   - no signs of bleed   - Continue ferrous sulfate and folic acid  - maintain type and screen     # Benign prostatic hyperplasia   - Continue Flomax 0.4 mg qhs + Finasteride 5 mg qd     # GERD  - switch from dexilant to Protonix 40 mg qhs in hospital     # Miscellaneous   - DVT prophylaxis : lovenox    - GI prophylaxis : Protonix 40 mg qd   - Activity : bedrest for now pending ortho eval  - Diet : Dysphagia                      89 year old male patient with past medical history of type 2 DM, CAD s/p CABG, chronic afib, bilateral thromboembolic stroke in 2020, Parkinson's disease, Iron deficiency anemia, folic acid deficiency, benign prostatic hyperplasia, came in for fall about 2 days before presentation. Was found by his daughter on the floor - was awake and alert when he was found about 30-60 mins after the fall. The patient fell onto his right side and he has been unable to bear weight since due to rt hip pain (aching/sharp, radiating distally, moderate to severe, alleviated by rest and aggravated by movement).      # Unwitnessed Fall :  - CT head was negative for any IC pathology   - EKG showed Afib with RVR, LBBB old   - TTE ( last EF 53% on june 2020 / no other valvular abnormalities ), repeat TTE ordered   - Trops: 0.03 > 0.04 > 0.04     #Acute right intertrochanteric fracture w/ mild comminution  -CT Acute right intertrochanteric fracture with mild comminution and mild displacement at the greater trochanter.  -Orthopedic recommended NWB of right leg, PT, no surgical intervention at this point.   -MRI can be done outpatient. DVT prophylaxis.       # bilateral thromboembolic stroke:  - in june 2020 most likely secondary to his afib  - patient was supposed to f/u with neurology as OP but he never followed up with them to check if he is a candidate for AC  - does not have any cardiologist   - not on AC now  - c/w aspirin + statins     # Chronic afib:  - increase metoprolol ER to 50mg OD today    # Parkinson's disease   - Continue Carbidopa / levodopa 25/100 TID     # CAD s/p CABG   - Continue Aspirin 81 mg qd + Lipitor 40 mg qhs + metoprolol       # Normocytic Anemia, and thrmobocytopenia   # Thrombocytopenia   - PLT 97   - no signs of bleed   - Continue ferrous sulfate and folic acid    # Benign prostatic hyperplasia   - Continue Flomax 0.4 mg qhs + Finasteride 5 mg qd     # GERD  - switch from dexilant to Protonix 40 mg qhs in hospital     # Miscellaneous   - DVT prophylaxis : lovenox    - GI prophylaxis : Protonix 40 mg qd   - Activity : bedrest for now pending ortho eval  - Diet : Dysphagia

## 2021-08-23 NOTE — PHYSICAL THERAPY INITIAL EVALUATION ADULT - LEVEL OF INDEPENDENCE: SIT/STAND, REHAB EVAL
Pt. unable to perform sit to stand today, pt. in too much pain when attempted to stand, OOB activity held today at this time/unable to perform

## 2021-08-24 LAB
ALBUMIN SERPL ELPH-MCNC: 3.5 G/DL — SIGNIFICANT CHANGE UP (ref 3.5–5.2)
ALP SERPL-CCNC: 82 U/L — SIGNIFICANT CHANGE UP (ref 30–115)
ALT FLD-CCNC: <5 U/L — SIGNIFICANT CHANGE UP (ref 0–41)
ANION GAP SERPL CALC-SCNC: 10 MMOL/L — SIGNIFICANT CHANGE UP (ref 7–14)
AST SERPL-CCNC: 14 U/L — SIGNIFICANT CHANGE UP (ref 0–41)
BASOPHILS # BLD AUTO: 0.02 K/UL — SIGNIFICANT CHANGE UP (ref 0–0.2)
BASOPHILS NFR BLD AUTO: 0.3 % — SIGNIFICANT CHANGE UP (ref 0–1)
BILIRUB SERPL-MCNC: 0.5 MG/DL — SIGNIFICANT CHANGE UP (ref 0.2–1.2)
BUN SERPL-MCNC: 25 MG/DL — HIGH (ref 10–20)
CALCIUM SERPL-MCNC: 8.6 MG/DL — SIGNIFICANT CHANGE UP (ref 8.5–10.1)
CHLORIDE SERPL-SCNC: 104 MMOL/L — SIGNIFICANT CHANGE UP (ref 98–110)
CO2 SERPL-SCNC: 24 MMOL/L — SIGNIFICANT CHANGE UP (ref 17–32)
CREAT SERPL-MCNC: 1 MG/DL — SIGNIFICANT CHANGE UP (ref 0.7–1.5)
CULTURE RESULTS: SIGNIFICANT CHANGE UP
EOSINOPHIL # BLD AUTO: 0.12 K/UL — SIGNIFICANT CHANGE UP (ref 0–0.7)
EOSINOPHIL NFR BLD AUTO: 1.5 % — SIGNIFICANT CHANGE UP (ref 0–8)
GLUCOSE BLDC GLUCOMTR-MCNC: 128 MG/DL — HIGH (ref 70–99)
GLUCOSE BLDC GLUCOMTR-MCNC: 131 MG/DL — HIGH (ref 70–99)
GLUCOSE BLDC GLUCOMTR-MCNC: 160 MG/DL — HIGH (ref 70–99)
GLUCOSE BLDC GLUCOMTR-MCNC: 97 MG/DL — SIGNIFICANT CHANGE UP (ref 70–99)
GLUCOSE SERPL-MCNC: 106 MG/DL — HIGH (ref 70–99)
HCT VFR BLD CALC: 34.4 % — LOW (ref 42–52)
HGB BLD-MCNC: 11.3 G/DL — LOW (ref 14–18)
IMM GRANULOCYTES NFR BLD AUTO: 0.3 % — SIGNIFICANT CHANGE UP (ref 0.1–0.3)
LYMPHOCYTES # BLD AUTO: 1.07 K/UL — LOW (ref 1.2–3.4)
LYMPHOCYTES # BLD AUTO: 13.5 % — LOW (ref 20.5–51.1)
MAGNESIUM SERPL-MCNC: 1.8 MG/DL — SIGNIFICANT CHANGE UP (ref 1.8–2.4)
MCHC RBC-ENTMCNC: 30.1 PG — SIGNIFICANT CHANGE UP (ref 27–31)
MCHC RBC-ENTMCNC: 32.8 G/DL — SIGNIFICANT CHANGE UP (ref 32–37)
MCV RBC AUTO: 91.7 FL — SIGNIFICANT CHANGE UP (ref 80–94)
MONOCYTES # BLD AUTO: 0.59 K/UL — SIGNIFICANT CHANGE UP (ref 0.1–0.6)
MONOCYTES NFR BLD AUTO: 7.4 % — SIGNIFICANT CHANGE UP (ref 1.7–9.3)
NEUTROPHILS # BLD AUTO: 6.13 K/UL — SIGNIFICANT CHANGE UP (ref 1.4–6.5)
NEUTROPHILS NFR BLD AUTO: 77 % — HIGH (ref 42.2–75.2)
NRBC # BLD: 0 /100 WBCS — SIGNIFICANT CHANGE UP (ref 0–0)
PLATELET # BLD AUTO: 112 K/UL — LOW (ref 130–400)
POTASSIUM SERPL-MCNC: 4.4 MMOL/L — SIGNIFICANT CHANGE UP (ref 3.5–5)
POTASSIUM SERPL-SCNC: 4.4 MMOL/L — SIGNIFICANT CHANGE UP (ref 3.5–5)
PROT SERPL-MCNC: 5.9 G/DL — LOW (ref 6–8)
RBC # BLD: 3.75 M/UL — LOW (ref 4.7–6.1)
RBC # FLD: 12.7 % — SIGNIFICANT CHANGE UP (ref 11.5–14.5)
SODIUM SERPL-SCNC: 138 MMOL/L — SIGNIFICANT CHANGE UP (ref 135–146)
SPECIMEN SOURCE: SIGNIFICANT CHANGE UP
WBC # BLD: 7.95 K/UL — SIGNIFICANT CHANGE UP (ref 4.8–10.8)
WBC # FLD AUTO: 7.95 K/UL — SIGNIFICANT CHANGE UP (ref 4.8–10.8)

## 2021-08-24 PROCEDURE — 99233 SBSQ HOSP IP/OBS HIGH 50: CPT

## 2021-08-24 RX ORDER — MAGNESIUM SULFATE 500 MG/ML
2 VIAL (ML) INJECTION ONCE
Refills: 0 | Status: COMPLETED | OUTPATIENT
Start: 2021-08-24 | End: 2021-08-24

## 2021-08-24 RX ORDER — APIXABAN 2.5 MG/1
5 TABLET, FILM COATED ORAL
Refills: 0 | Status: DISCONTINUED | OUTPATIENT
Start: 2021-08-24 | End: 2021-08-27

## 2021-08-24 RX ADMIN — CHLORHEXIDINE GLUCONATE 1 APPLICATION(S): 213 SOLUTION TOPICAL at 06:28

## 2021-08-24 RX ADMIN — PANTOPRAZOLE SODIUM 40 MILLIGRAM(S): 20 TABLET, DELAYED RELEASE ORAL at 06:27

## 2021-08-24 RX ADMIN — Medication 325 MILLIGRAM(S): at 13:21

## 2021-08-24 RX ADMIN — Medication 2 CAPSULE(S): at 17:19

## 2021-08-24 RX ADMIN — Medication 10 MILLIGRAM(S): at 21:19

## 2021-08-24 RX ADMIN — FINASTERIDE 5 MILLIGRAM(S): 5 TABLET, FILM COATED ORAL at 13:22

## 2021-08-24 RX ADMIN — CARBIDOPA AND LEVODOPA 1 TABLET(S): 25; 100 TABLET ORAL at 06:27

## 2021-08-24 RX ADMIN — APIXABAN 5 MILLIGRAM(S): 2.5 TABLET, FILM COATED ORAL at 17:19

## 2021-08-24 RX ADMIN — POLYETHYLENE GLYCOL 3350 17 GRAM(S): 17 POWDER, FOR SOLUTION ORAL at 17:18

## 2021-08-24 RX ADMIN — Medication 325 MILLIGRAM(S): at 21:19

## 2021-08-24 RX ADMIN — Medication 2 CAPSULE(S): at 07:58

## 2021-08-24 RX ADMIN — ATORVASTATIN CALCIUM 40 MILLIGRAM(S): 80 TABLET, FILM COATED ORAL at 21:19

## 2021-08-24 RX ADMIN — Medication 2 CAPSULE(S): at 13:21

## 2021-08-24 RX ADMIN — ENOXAPARIN SODIUM 40 MILLIGRAM(S): 100 INJECTION SUBCUTANEOUS at 13:22

## 2021-08-24 RX ADMIN — Medication 50 MILLIGRAM(S): at 06:27

## 2021-08-24 RX ADMIN — Medication 81 MILLIGRAM(S): at 13:23

## 2021-08-24 RX ADMIN — SODIUM CHLORIDE 60 MILLILITER(S): 9 INJECTION INTRAMUSCULAR; INTRAVENOUS; SUBCUTANEOUS at 13:19

## 2021-08-24 RX ADMIN — POLYETHYLENE GLYCOL 3350 17 GRAM(S): 17 POWDER, FOR SOLUTION ORAL at 06:27

## 2021-08-24 RX ADMIN — Medication 1 MILLIGRAM(S): at 13:22

## 2021-08-24 RX ADMIN — TAMSULOSIN HYDROCHLORIDE 0.4 MILLIGRAM(S): 0.4 CAPSULE ORAL at 21:19

## 2021-08-24 RX ADMIN — Medication 325 MILLIGRAM(S): at 06:27

## 2021-08-24 RX ADMIN — GABAPENTIN 300 MILLIGRAM(S): 400 CAPSULE ORAL at 13:21

## 2021-08-24 RX ADMIN — CARBIDOPA AND LEVODOPA 1 TABLET(S): 25; 100 TABLET ORAL at 21:19

## 2021-08-24 RX ADMIN — CARBIDOPA AND LEVODOPA 1 TABLET(S): 25; 100 TABLET ORAL at 13:21

## 2021-08-24 RX ADMIN — Medication 50 GRAM(S): at 13:19

## 2021-08-24 NOTE — PROGRESS NOTE ADULT - ASSESSMENT
89 year old male patient with past medical history of type 2 DM, CAD s/p CABG, chronic afib, bilateral thromboembolic stroke in 2020, Parkinson's disease, Iron deficiency anemia, folic acid deficiency, benign prostatic hyperplasia, came in for fall about 2 days before presentation. Was found by his daughter on the floor - was awake and alert when he was found about 30-60 mins after the fall. The patient fell onto his right side and he has been unable to bear weight since due to rt hip pain (aching/sharp, radiating distally, moderate to severe, alleviated by rest and aggravated by movement).      # Unwitnessed Fall :  - CT head was negative for any IC pathology   - EKG showed Afib with RVR, LBBB old   - TTE ( last EF 53% on june 2020 / no other valvular abnormalities ), repeat TTE ordered   - Trops: 0.03 > 0.04 > 0.04     #Acute right intertrochanteric fracture w/ mild comminution  -CT Acute right intertrochanteric fracture with mild comminution and mild displacement at the greater trochanter.  -Orthopedic recommended NWB of right leg for at least 2 weeks --> no surgical intervention at this point.   -MRI can be done outpatient. DVT prophylaxis.   - PT on board       # bilateral thromboembolic stroke:  - in june 2020 most likely secondary to his afib  - patient was supposed to f/u with neurology as OP but he never followed up with them to check if he is a candidate for AC  - does not have any cardiologist   - not on AC now  - c/w aspirin + statins     # Chronic afib:  - increase metoprolol ER to 50mg OD today    # Parkinson's disease   - Continue Carbidopa / levodopa 25/100 TID     # CAD s/p CABG   - Continue Aspirin 81 mg qd + Lipitor 40 mg qhs + metoprolol       # Normocytic Anemia, and thrmobocytopenia   # Thrombocytopenia   - PLT 97   - no signs of bleed   - Continue ferrous sulfate and folic acid    # Benign prostatic hyperplasia   - Continue Flomax 0.4 mg qhs + Finasteride 5 mg qd     # GERD  - switch from dexilant to Protonix 40 mg qhs in hospital     # Miscellaneous   - DVT prophylaxis : lovenox    - GI prophylaxis : Protonix 40 mg qd   - Activity : bedrest for now pending ortho eval  - Diet : Dysphagia

## 2021-08-24 NOTE — SWALLOW BEDSIDE ASSESSMENT ADULT - SLP PERTINENT HISTORY OF CURRENT PROBLEM
89 year old male patient with past medical history of type 2 DM, CAD s/p CABG, chronic afib, bilateral thromboembolic stroke in 2020, Parkinson's disease, Iron deficiency anemia, folic acid deficiency, benign prostatic hyperplasia, came in for fall this morning
pt is a 89 y/o M w/ PMHx: HTN, DM2, CAD s/p CABG, CVA and PD who was brought to ED for right hip pain after unwitnessed fall. pt being treated for acute R intertrochanteric fracture w/ mild comminution. Ortho recommended NWB of right leg and PT, no surgical intervention at this point. CTH and CXR (-)
89 year old male patient with past medical history of type 2 DM, CAD s/p CABG, chronic afib, bilateral thromboembolic stroke in 2020, Parkinson's disease, Iron deficiency anemia, folic acid deficiency, benign prostatic hyperplasia, came in for fall this morning

## 2021-08-24 NOTE — SWALLOW BEDSIDE ASSESSMENT ADULT - SWALLOW EVAL: DIAGNOSIS
+toleration for puree, soft hard-boiled egg, thin liquids, and nectar-thick liquids w/o overt s/s aspiration/penetration
moderate oral dysphagia for Dysphagia Diet II mechanical soft consistency with ground meat, mild oral dysphagia for Dysphagia diet I puree consistency. +toleration of Nectar-thickened liquids w/no overt s/s of aspiration/penetration. +cough w/thins

## 2021-08-24 NOTE — SWALLOW BEDSIDE ASSESSMENT ADULT - NS SPL SWALLOW CLINIC TRIAL FT
pureed foods recommended at this time 2' pt edentulous and consumes mostly pureed foods at home.
mild oral dysphagia w/no overt s/s of aspiration/penetration

## 2021-08-24 NOTE — PROGRESS NOTE ADULT - ASSESSMENT
89 yo Male with PMH of HTN, DM2, CAD s/p CABG, CVA and Parkinson's disease was brought to ED for right hip after unwitnessed fall about 2 days before presentation. Was found by his daughter on the floor - was awake and alert when he was found about 30-60 mins after the fall. The patient fell onto his right side and he has been unable to bear weight since due to rt hip pain (aching/sharp, radiating distally, moderate to severe, alleviated by rest and aggravated by movement).    A/P:   Unwitnessed Fall on 8/19  Patient was found on the floor, awake after 30 minutes, possible Syncope.   EKG showed atrial fibrillation, LBBB, old.   Head CT showed no acute pathology, Mild chronic microvascular changes and has chronic infarcts of the left inferior cerebellar hemisphere and right medial thalamus.  Continue telemetry.     Acute right intertrochanteric fracture w/ mild comminution  CT Acute right intertrochanteric fracture with mild comminution and mild displacement at the greater trochanter.  Orthopedic recommended NWB of right leg, PT, no surgical intervention at this point.   MRI can be done outpatient. DVT prophylaxis.     Chronic Atrial Fibrillation/Flutter with RVR:   [id number]  Continue Metoprolol, increased to 50mg daily to control the rate.     CAD s/p CABG: Continue ASA, Metoprolol and Lipitor.   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 Not on anticoagulation.     DM type 2 (BG < 140 mg/dL thus far)  CKD 3 stable renal function.    Normocytic anemia & thrombocytopenia  Iron and folate deficiency  Continue ferrous sulfate and folic acid.     Parkinson's disease: on Sinemet.   BPH: on Tamsulosin and Finasteride.     #Progress Note Handoff:  Pending (specify): placement.   Family discussion:  Disposition: Home vs STR.    89 yo Male with PMH of HTN, DM2, CAD s/p CABG, CVA and Parkinson's disease was brought to ED for right hip after unwitnessed fall about 2 days before presentation. Was found by his daughter on the floor - was awake and alert when he was found about 30-60 mins after the fall. The patient fell onto his right side and he has been unable to bear weight since due to rt hip pain (aching/sharp, radiating distally, moderate to severe, alleviated by rest and aggravated by movement).    A/P:   Unwitnessed Fall on 8/19  Patient was found on the floor, awake after 30 minutes, possible Syncope.   EKG showed atrial fibrillation, LBBB, old.   Head CT showed no acute pathology, Mild chronic microvascular changes and has chronic infarcts of the left inferior cerebellar hemisphere and right medial thalamus.  Continue telemetry.     Acute right intertrochanteric fracture w/ mild comminution  CT Acute right intertrochanteric fracture with mild comminution and mild displacement at the greater trochanter.  Orthopedic recommended NWB of right leg, PT, no surgical intervention at this point.   MRI can be done outpatient. DVT prophylaxis.     Chronic Atrial Fibrillation/Flutter with RVR:   [id number]  Continue Metoprolol, increased to 50mg daily to control the rate.   Start on Eliquis 5mg BID, family agreed.     CAD s/p CABG: Continue ASA, Metoprolol and Lipitor.   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 Not on anticoagulation.     DM type 2 (BG < 140 mg/dL thus far)  CKD 3 stable renal function.    Normocytic anemia & thrombocytopenia  Iron and folate deficiency  Continue ferrous sulfate and folic acid.     Parkinson's disease: on Sinemet.   BPH: on Tamsulosin and Finasteride.     #Progress Note Handoff:  Pending (specify): improving HR, placement.   Family discussion: Discussed with his grand daughter Flor, she has concern why MRI not done, will have orthopedic to call her to answer her questions.   Disposition: Home vs STR.

## 2021-08-24 NOTE — PROGRESS NOTE ADULT - SUBJECTIVE AND OBJECTIVE BOX
ALEXY PURDY 89y Male      Patient is a 89y old  Male who presents with a chief complaint of fall (24 Aug 2021 12:20)        INTERVAL HPI/OVERNIGHT EVENTS: No acute events overnight. Patient was seen and evaluated at the bedside. The patient denies pain. Vitals stable. Patient denies fever/chills, chest pain, shortness of breath, abdominal pain, headaches, nausea/vomiting, and diarrhea/constipation.      PHYSICAL EXAM:  GENERAL: NAD  HEAD:  Normocephalic  EYES:  conjunctiva and sclera clear  ENMT: Moist mucous membranes  NECK: Supple  NERVOUS SYSTEM:  Alert, awake  CHEST/LUNG: Good air exchange bilaterally, no wheeze  HEART: Regular rate and rhythm        Vital Signs Last 24 Hrs  T(C): 35.2 (24 Aug 2021 05:09), Max: 35.8 (23 Aug 2021 20:42)  T(F): 95.4 (24 Aug 2021 05:09), Max: 96.4 (23 Aug 2021 20:42)  HR: 82 (24 Aug 2021 05:09) (77 - 82)  BP: 139/63 (24 Aug 2021 05:09) (124/60 - 139/63)  BP(mean): --  RR: 18 (24 Aug 2021 05:09) (16 - 18)  SpO2: --      Consultant(s) Notes Reviewed:  [X] YES  [ ] NO  Care Discussed with Consultants/Other Providers [X] YES  [ ] NO  Imaging Personally Reviewed:  [X] YES  [ ] NO      LABS:                        11.3   7.95  )-----------( 112      ( 24 Aug 2021 05:30 )             34.4     08-24    138  |  104  |  25<H>  ----------------------------<  106<H>  4.4   |  24  |  1.0    Ca    8.6      24 Aug 2021 05:30  Mg     1.8     08-24    TPro  5.9<L>  /  Alb  3.5  /  TBili  0.5  /  DBili  x   /  AST  14  /  ALT  <5  /  AlkPhos  82  08-24          CAPILLARY BLOOD GLUCOSE      POCT Blood Glucose.: 160 mg/dL (24 Aug 2021 11:21)  POCT Blood Glucose.: 128 mg/dL (24 Aug 2021 07:37)  POCT Blood Glucose.: 140 mg/dL (23 Aug 2021 22:05)  POCT Blood Glucose.: 144 mg/dL (23 Aug 2021 16:37)           ALEXY PURDY 89y Male      Patient is a 89y old  Male who presents with a chief complaint of fall (24 Aug 2021 12:20)        INTERVAL HPI/OVERNIGHT EVENTS: No acute events overnight. Pt speaks Latvian, still complaining of pain.       PHYSICAL EXAM:  GENERAL: NAD  HEAD:  Normocephalic  EYES:  conjunctiva and sclera clear  ENMT: Moist mucous membranes  NECK: Supple  NERVOUS SYSTEM:  Alert, awake  CHEST/LUNG: Good air exchange bilaterally, no wheeze  HEART: Regular rate and rhythm        Vital Signs Last 24 Hrs  T(C): 35.2 (24 Aug 2021 05:09), Max: 35.8 (23 Aug 2021 20:42)  T(F): 95.4 (24 Aug 2021 05:09), Max: 96.4 (23 Aug 2021 20:42)  HR: 82 (24 Aug 2021 05:09) (77 - 82)  BP: 139/63 (24 Aug 2021 05:09) (124/60 - 139/63)  BP(mean): --  RR: 18 (24 Aug 2021 05:09) (16 - 18)  SpO2: --      Consultant(s) Notes Reviewed:  [X] YES  [ ] NO  Care Discussed with Consultants/Other Providers [X] YES  [ ] NO  Imaging Personally Reviewed:  [X] YES  [ ] NO      LABS:                        11.3   7.95  )-----------( 112      ( 24 Aug 2021 05:30 )             34.4     08-24    138  |  104  |  25<H>  ----------------------------<  106<H>  4.4   |  24  |  1.0    Ca    8.6      24 Aug 2021 05:30  Mg     1.8     08-24    TPro  5.9<L>  /  Alb  3.5  /  TBili  0.5  /  DBili  x   /  AST  14  /  ALT  <5  /  AlkPhos  82  08-24          CAPILLARY BLOOD GLUCOSE      POCT Blood Glucose.: 160 mg/dL (24 Aug 2021 11:21)  POCT Blood Glucose.: 128 mg/dL (24 Aug 2021 07:37)  POCT Blood Glucose.: 140 mg/dL (23 Aug 2021 22:05)  POCT Blood Glucose.: 144 mg/dL (23 Aug 2021 16:37)

## 2021-08-24 NOTE — SWALLOW BEDSIDE ASSESSMENT ADULT - SWALLOW EVAL: CURRENT DIET
Dysphagia diet I puree consistency, Nectar-thickened liquids
dysphagia 1 w/ nectar-thick liquids
NPO

## 2021-08-24 NOTE — PROGRESS NOTE ADULT - SUBJECTIVE AND OBJECTIVE BOX
GURWINDER, ZAKHAR  89y  Male      Patient is a 89y old  Male who presents with a chief complaint of fall (23 Aug 2021 13:51)      INTERVAL HPI/OVERNIGHT EVENTS:  He is still with hip pain.   Vital Signs Last 24 Hrs  T(C): 35.2 (24 Aug 2021 05:09), Max: 36.2 (23 Aug 2021 13:15)  T(F): 95.4 (24 Aug 2021 05:09), Max: 97.2 (23 Aug 2021 13:15)  HR: 82 (24 Aug 2021 05:09) (77 - 82)  BP: 139/63 (24 Aug 2021 05:09) (106/58 - 139/63)  BP(mean): --  RR: 18 (24 Aug 2021 05:09) (16 - 18)  SpO2: --      08-23-21 @ 07:01  -  08-24-21 @ 07:00  --------------------------------------------------------  IN: 1940 mL / OUT: 650 mL / NET: 1290 mL    08-24-21 @ 07:01  -  08-24-21 @ 12:22  --------------------------------------------------------  IN: 50 mL / OUT: 0 mL / NET: 50 mL            Consultant(s) Notes Reviewed:  [x ] YES  [ ] NO          MEDICATIONS  (STANDING):  aspirin enteric coated 81 milliGRAM(s) Oral daily  atorvastatin 40 milliGRAM(s) Oral at bedtime  bisacodyl 10 milliGRAM(s) Oral at bedtime  carbidopa/levodopa  25/100 1 Tablet(s) Oral three times a day  chlorhexidine 4% Liquid 1 Application(s) Topical <User Schedule>  enoxaparin Injectable 40 milliGRAM(s) SubCutaneous daily  ferrous    sulfate 325 milliGRAM(s) Oral three times a day  finasteride 5 milliGRAM(s) Oral daily  folic acid 1 milliGRAM(s) Oral daily  gabapentin 300 milliGRAM(s) Oral daily  magnesium sulfate  IVPB 2 Gram(s) IV Intermittent once  metoprolol succinate ER 50 milliGRAM(s) Oral daily  pancrelipase  (CREON 12,000 Lipase Units) 2 Capsule(s) Oral three times a day with meals  pantoprazole    Tablet 40 milliGRAM(s) Oral before breakfast  polyethylene glycol 3350 17 Gram(s) Oral two times a day  sodium chloride 0.9%. 1000 milliLiter(s) (60 mL/Hr) IV Continuous <Continuous>  tamsulosin 0.4 milliGRAM(s) Oral at bedtime    MEDICATIONS  (PRN):  acetaminophen   Tablet .. 650 milliGRAM(s) Oral every 6 hours PRN Temp greater or equal to 38.5C (101.3F), Mild Pain (1 - 3)  melatonin 3 milliGRAM(s) Oral at bedtime PRN Insomnia  morphine  - Injectable 2 milliGRAM(s) IV Push every 4 hours PRN Severe Pain (7 - 10)  ondansetron Injectable 4 milliGRAM(s) IV Push every 8 hours PRN Nausea and/or Vomiting      LABS                          11.3   7.95  )-----------( 112      ( 24 Aug 2021 05:30 )             34.4     08-24    138  |  104  |  25<H>  ----------------------------<  106<H>  4.4   |  24  |  1.0    Ca    8.6      24 Aug 2021 05:30  Mg     1.8     08-24    TPro  5.9<L>  /  Alb  3.5  /  TBili  0.5  /  DBili  x   /  AST  14  /  ALT  <5  /  AlkPhos  82  08-24          Lactate Trend        CAPILLARY BLOOD GLUCOSE      POCT Blood Glucose.: 160 mg/dL (24 Aug 2021 11:21)        RADIOLOGY & ADDITIONAL TESTS:    Imaging Personally Reviewed:  [ ] YES  [ ] NO    HEALTH ISSUES - PROBLEM Dx:  Dehydration            PHYSICAL EXAM:  GENERAL: NAD, well-developed.  HEAD:  Atraumatic, Normocephalic.  EYES: EOMI, PERRLA, conjunctiva and sclera clear.  NECK: Supple, No JVD.  CHEST/LUNG: Clear to auscultation bilaterally; No wheeze.  HEART: Irregular rate and rhythm; S1 S2.   ABDOMEN: Soft, Nontender, Nondistended; Bowel sounds present.  EXTREMITIES:  2+ Peripheral Pulses, No clubbing, cyanosis, or edema.  PSYCH: AAOx1.  NEUROLOGY: non-focal.  SKIN: No rashes or lesions.

## 2021-08-24 NOTE — SWALLOW BEDSIDE ASSESSMENT ADULT - SLP GENERAL OBSERVATIONS
pt received in bed awake alert w/o c/o pain. +room air; family at bedside
+global weakness with dysphonia. +tremulous
+global weakness with dysphonia. +tremulous

## 2021-08-24 NOTE — SWALLOW BEDSIDE ASSESSMENT ADULT - SWALLOW EVAL: RECOMMENDED DIET
dysphagia 1 w/ thin liquids; pt may have soft minced pieces of hard-boiled egg
Dysphagia diet I puree consistency, Nectar-thickened liquids 1:1
Dysphagia diet I puree consistency, Nectar-thickened liquids 1:1

## 2021-08-25 LAB
ALBUMIN SERPL ELPH-MCNC: 3.3 G/DL — LOW (ref 3.5–5.2)
ALP SERPL-CCNC: 75 U/L — SIGNIFICANT CHANGE UP (ref 30–115)
ALT FLD-CCNC: <5 U/L — SIGNIFICANT CHANGE UP (ref 0–41)
ANION GAP SERPL CALC-SCNC: 11 MMOL/L — SIGNIFICANT CHANGE UP (ref 7–14)
AST SERPL-CCNC: 12 U/L — SIGNIFICANT CHANGE UP (ref 0–41)
BASOPHILS # BLD AUTO: 0.02 K/UL — SIGNIFICANT CHANGE UP (ref 0–0.2)
BASOPHILS NFR BLD AUTO: 0.3 % — SIGNIFICANT CHANGE UP (ref 0–1)
BILIRUB SERPL-MCNC: 0.5 MG/DL — SIGNIFICANT CHANGE UP (ref 0.2–1.2)
BUN SERPL-MCNC: 17 MG/DL — SIGNIFICANT CHANGE UP (ref 10–20)
CALCIUM SERPL-MCNC: 8.5 MG/DL — SIGNIFICANT CHANGE UP (ref 8.5–10.1)
CHLORIDE SERPL-SCNC: 107 MMOL/L — SIGNIFICANT CHANGE UP (ref 98–110)
CO2 SERPL-SCNC: 22 MMOL/L — SIGNIFICANT CHANGE UP (ref 17–32)
CREAT SERPL-MCNC: 0.9 MG/DL — SIGNIFICANT CHANGE UP (ref 0.7–1.5)
EOSINOPHIL # BLD AUTO: 0.15 K/UL — SIGNIFICANT CHANGE UP (ref 0–0.7)
EOSINOPHIL NFR BLD AUTO: 2.1 % — SIGNIFICANT CHANGE UP (ref 0–8)
GLUCOSE BLDC GLUCOMTR-MCNC: 106 MG/DL — HIGH (ref 70–99)
GLUCOSE BLDC GLUCOMTR-MCNC: 112 MG/DL — HIGH (ref 70–99)
GLUCOSE BLDC GLUCOMTR-MCNC: 130 MG/DL — HIGH (ref 70–99)
GLUCOSE BLDC GLUCOMTR-MCNC: 136 MG/DL — HIGH (ref 70–99)
GLUCOSE SERPL-MCNC: 119 MG/DL — HIGH (ref 70–99)
HCT VFR BLD CALC: 31 % — LOW (ref 42–52)
HGB BLD-MCNC: 10.4 G/DL — LOW (ref 14–18)
IMM GRANULOCYTES NFR BLD AUTO: 0.4 % — HIGH (ref 0.1–0.3)
LYMPHOCYTES # BLD AUTO: 1.58 K/UL — SIGNIFICANT CHANGE UP (ref 1.2–3.4)
LYMPHOCYTES # BLD AUTO: 22.3 % — SIGNIFICANT CHANGE UP (ref 20.5–51.1)
MAGNESIUM SERPL-MCNC: 2 MG/DL — SIGNIFICANT CHANGE UP (ref 1.8–2.4)
MCHC RBC-ENTMCNC: 30.9 PG — SIGNIFICANT CHANGE UP (ref 27–31)
MCHC RBC-ENTMCNC: 33.5 G/DL — SIGNIFICANT CHANGE UP (ref 32–37)
MCV RBC AUTO: 92 FL — SIGNIFICANT CHANGE UP (ref 80–94)
MONOCYTES # BLD AUTO: 0.55 K/UL — SIGNIFICANT CHANGE UP (ref 0.1–0.6)
MONOCYTES NFR BLD AUTO: 7.8 % — SIGNIFICANT CHANGE UP (ref 1.7–9.3)
NEUTROPHILS # BLD AUTO: 4.74 K/UL — SIGNIFICANT CHANGE UP (ref 1.4–6.5)
NEUTROPHILS NFR BLD AUTO: 67.1 % — SIGNIFICANT CHANGE UP (ref 42.2–75.2)
NRBC # BLD: 0 /100 WBCS — SIGNIFICANT CHANGE UP (ref 0–0)
PLATELET # BLD AUTO: 112 K/UL — LOW (ref 130–400)
POTASSIUM SERPL-MCNC: 4 MMOL/L — SIGNIFICANT CHANGE UP (ref 3.5–5)
POTASSIUM SERPL-SCNC: 4 MMOL/L — SIGNIFICANT CHANGE UP (ref 3.5–5)
PROT SERPL-MCNC: 5.6 G/DL — LOW (ref 6–8)
RBC # BLD: 3.37 M/UL — LOW (ref 4.7–6.1)
RBC # FLD: 12.9 % — SIGNIFICANT CHANGE UP (ref 11.5–14.5)
SARS-COV-2 RNA SPEC QL NAA+PROBE: SIGNIFICANT CHANGE UP
SODIUM SERPL-SCNC: 140 MMOL/L — SIGNIFICANT CHANGE UP (ref 135–146)
WBC # BLD: 7.07 K/UL — SIGNIFICANT CHANGE UP (ref 4.8–10.8)
WBC # FLD AUTO: 7.07 K/UL — SIGNIFICANT CHANGE UP (ref 4.8–10.8)

## 2021-08-25 PROCEDURE — 99233 SBSQ HOSP IP/OBS HIGH 50: CPT

## 2021-08-25 RX ADMIN — GABAPENTIN 300 MILLIGRAM(S): 400 CAPSULE ORAL at 12:39

## 2021-08-25 RX ADMIN — Medication 1 MILLIGRAM(S): at 12:39

## 2021-08-25 RX ADMIN — Medication 50 MILLIGRAM(S): at 06:19

## 2021-08-25 RX ADMIN — APIXABAN 5 MILLIGRAM(S): 2.5 TABLET, FILM COATED ORAL at 06:19

## 2021-08-25 RX ADMIN — Medication 81 MILLIGRAM(S): at 12:38

## 2021-08-25 RX ADMIN — ATORVASTATIN CALCIUM 40 MILLIGRAM(S): 80 TABLET, FILM COATED ORAL at 21:46

## 2021-08-25 RX ADMIN — Medication 2 CAPSULE(S): at 12:38

## 2021-08-25 RX ADMIN — CHLORHEXIDINE GLUCONATE 1 APPLICATION(S): 213 SOLUTION TOPICAL at 06:19

## 2021-08-25 RX ADMIN — PANTOPRAZOLE SODIUM 40 MILLIGRAM(S): 20 TABLET, DELAYED RELEASE ORAL at 06:19

## 2021-08-25 RX ADMIN — CARBIDOPA AND LEVODOPA 1 TABLET(S): 25; 100 TABLET ORAL at 13:46

## 2021-08-25 RX ADMIN — FINASTERIDE 5 MILLIGRAM(S): 5 TABLET, FILM COATED ORAL at 12:38

## 2021-08-25 RX ADMIN — Medication 2 CAPSULE(S): at 17:05

## 2021-08-25 RX ADMIN — POLYETHYLENE GLYCOL 3350 17 GRAM(S): 17 POWDER, FOR SOLUTION ORAL at 17:05

## 2021-08-25 RX ADMIN — Medication 325 MILLIGRAM(S): at 06:19

## 2021-08-25 RX ADMIN — APIXABAN 5 MILLIGRAM(S): 2.5 TABLET, FILM COATED ORAL at 17:05

## 2021-08-25 RX ADMIN — Medication 10 MILLIGRAM(S): at 21:46

## 2021-08-25 RX ADMIN — CARBIDOPA AND LEVODOPA 1 TABLET(S): 25; 100 TABLET ORAL at 06:19

## 2021-08-25 RX ADMIN — Medication 2 CAPSULE(S): at 12:40

## 2021-08-25 RX ADMIN — TAMSULOSIN HYDROCHLORIDE 0.4 MILLIGRAM(S): 0.4 CAPSULE ORAL at 21:46

## 2021-08-25 RX ADMIN — Medication 325 MILLIGRAM(S): at 21:46

## 2021-08-25 RX ADMIN — Medication 325 MILLIGRAM(S): at 13:46

## 2021-08-25 RX ADMIN — CARBIDOPA AND LEVODOPA 1 TABLET(S): 25; 100 TABLET ORAL at 21:46

## 2021-08-25 NOTE — CDI QUERY NOTE - NSCDIOTHERTXTBX_GEN_ALL_CORE_HH
________________________________________________________________    89 M,  with Diagnosis: Unwitnessed Fall on 8/19, Acute right intertrochanteric fracture w/ mild comminution    8/21/2021:  H&P: Attending: hx obtained from his granddaughter at the bedside. she reports that his daughter found him on the floor in the bathroom. fall happened when the patient was trying to get out of bed and was going to the bathroom while using his rolling walker. he suddenly fell down, no witnessed LOC, seizures like activity. they found him on the floor    Clinical Indicator:  X-Ray Pelvis: : 8/21/2021: Findings/impression: Diffuse osteopenia. Acute right greater trochanteric fracture with extension into the intertrochanteric ridge is better visualized on the CT bony pelvis. Moderate bilateral hip osteoarthritis. Degenerative changes of the sacroiliac joints and lower lumbar spine are also noted. There are vascular calcifications.    Management: - Orthopedic recommended NWB of right leg for at least 2 weeks --> no surgical intervention at this point.    Based on your professional judgment and clinical indicators, can the diagnosis of Acute Right Intertrochanteric Fracture be further specified as:     [ ]  Acute Right Intertrochanteric Fracture associated with Osteopenia  [ ]  Other (please specify)  [ ]  Clinically unable to determine    Thank you. ________________________________________________________________    89 M,  with Diagnosis: Unwitnessed Fall on 8/19, Acute right intertrochanteric fracture w/ mild comminution    8/21/2021:  H&P: Attending: hx obtained from his granddaughter at the bedside. she reports that his daughter found him on the floor in the bathroom. fall happened when the patient was trying to get out of bed and was going to the bathroom while using his rolling walker. he suddenly fell down, no witnessed LOC, seizures like activity. they found him on the floor    Clinical Indicator:  X-Ray Pelvis: : 8/21/2021: Findings/impression: Diffuse osteopenia. Acute right greater trochanteric fracture with extension into the intertrochanteric ridge is better visualized on the CT bony pelvis. Moderate bilateral hip osteoarthritis. Degenerative changes of the sacroiliac joints and lower lumbar spine are also noted. There are vascular calcifications.    Management: - Orthopedic recommended NWB of right leg for at least 2 weeks --> no surgical intervention at this point.    Based on your professional judgment and the clinical indicators, please clarify if the imaging report of  osteopenia can be further specified as:    [ ]    Acute right intertrochanteric fracture associated with bone demineralization  [ ]    Clinically insignificant finding of osteopenia on the imaging reports  [ ]    Other (please specify):  [ ]    Clinically unable to determine        Thank you.

## 2021-08-25 NOTE — PROGRESS NOTE ADULT - SUBJECTIVE AND OBJECTIVE BOX
CHIEF COMPLAINT:    Patient is a 89y old  Male who presents with a chief complaint of fall     INTERVAL HPI/OVERNIGHT EVENTS:    Patient seen and examined at bedside. No acute overnight events occurred.    ROS: Denies SOB, chest pain. All other systems are negative.    Vital Signs:    T(F): 97 (21 @ 12:58), Max: 97.2 (21 @ 06:12)  HR: 87 (21 @ 12:58) (77 - 87)  BP: 100/56 (21 @ 12:58) (100/56 - 131/59)  RR: 18 (21 @ 12:58) (18 - 18)  SpO2: 96% (21 @ 13:26) (93% - 97%)  I&O's Summary    24 Aug 2021 07:01  -  25 Aug 2021 07:00  --------------------------------------------------------  IN: 1750 mL / OUT: 2020 mL / NET: -270 mL    25 Aug 2021 07:01  -  25 Aug 2021 15:01  --------------------------------------------------------  IN: 570 mL / OUT: 780 mL / NET: -210 mL      Daily     Daily Weight in k.6 (25 Aug 2021 06:12)  CAPILLARY BLOOD GLUCOSE      POCT Blood Glucose.: 136 mg/dL (25 Aug 2021 12:11)  POCT Blood Glucose.: 112 mg/dL (25 Aug 2021 07:37)  POCT Blood Glucose.: 97 mg/dL (24 Aug 2021 21:23)  POCT Blood Glucose.: 131 mg/dL (24 Aug 2021 16:39)      PHYSICAL EXAM:  GENERAL:  NAD  SKIN: No rashes or lesions  HEENT: Atraumatic. Normocephalic. Anicteric  NECK:  No JVD.   PULMONARY: Clear to ausculation bilaterally. No wheezing. No rales  CVS: Normal S1, S2. Regular rate and rhythm. No murmurs.  ABDOMEN/GI: Soft, Nontender, Nondistended; Bowel sounds are present  EXTREMITIES:  No edema B/L LE.  NEUROLOGIC:  No motor deficit, pill rolling tremor  PSYCH: Alert & oriented x 3, normal affect    Consultant(s) Notes Reviewed:  [x ] YES  [ ] NO  Care Discussed with Consultants/Other Providers [ x] YES  [ ] NO - attempted to call ortho--line busy multiple times    LABS:                        10.4   7.07  )-----------( 112      ( 25 Aug 2021 06:48 )             31.0     08-    140  |  107  |  17  ----------------------------<  119<H>  4.0   |  22  |  0.9    Ca    8.5      25 Aug 2021 06:48  Mg     2.0     -    TPro  5.6<L>  /  Alb  3.3<L>  /  TBili  0.5  /  DBili  x   /  AST  12  /  ALT  <5  /  AlkPhos  75  -      RADIOLOGY & ADDITIONAL TESTS:  Imaging or report Personally Reviewed:  [ ] YES  [ ] NO    Telemetry reviewed independently - bradycardia overnight, SVT two days ago    Medications:  Standing  apixaban 5 milliGRAM(s) Oral two times a day  aspirin enteric coated 81 milliGRAM(s) Oral daily  atorvastatin 40 milliGRAM(s) Oral at bedtime  bisacodyl 10 milliGRAM(s) Oral at bedtime  carbidopa/levodopa  25/100 1 Tablet(s) Oral three times a day  chlorhexidine 4% Liquid 1 Application(s) Topical <User Schedule>  ferrous    sulfate 325 milliGRAM(s) Oral three times a day  finasteride 5 milliGRAM(s) Oral daily  folic acid 1 milliGRAM(s) Oral daily  gabapentin 300 milliGRAM(s) Oral daily  metoprolol succinate ER 50 milliGRAM(s) Oral daily  pancrelipase  (CREON 12,000 Lipase Units) 2 Capsule(s) Oral three times a day with meals  pantoprazole    Tablet 40 milliGRAM(s) Oral before breakfast  polyethylene glycol 3350 17 Gram(s) Oral two times a day  tamsulosin 0.4 milliGRAM(s) Oral at bedtime    PRN Meds  acetaminophen   Tablet .. 650 milliGRAM(s) Oral every 6 hours PRN  melatonin 3 milliGRAM(s) Oral at bedtime PRN  morphine  - Injectable 2 milliGRAM(s) IV Push every 4 hours PRN  ondansetron Injectable 4 milliGRAM(s) IV Push every 8 hours PRN      Case discussed with resident  Care discussed with pt

## 2021-08-25 NOTE — CONSULT NOTE ADULT - ASSESSMENT
In light of complaints, will attain MRI to r/o intertrochanteric extension.  Until MRI, NWB RLE  If intertrochanteric extension, will consider ORIF.

## 2021-08-25 NOTE — PROGRESS NOTE ADULT - SUBJECTIVE AND OBJECTIVE BOX
ALEXY PURDY 89y Male      Patient is a 89y old  Male who presents with a chief complaint of fall (25 Aug 2021 16:20)        INTERVAL HPI/OVERNIGHT EVENTS: No acute events overnight. Pt complaining of      PHYSICAL EXAM:  GENERAL: NAD  HEAD:  Normocephalic  EYES:  conjunctiva and sclera clear  ENMT: Moist mucous membranes  NECK: Supple  NERVOUS SYSTEM:  Alert, awake  CHEST/LUNG: Good air exchange bilaterally, no wheeze  HEART: Regular rate and rhythm  No LLE        Vital Signs Last 24 Hrs  T(C): 36.1 (25 Aug 2021 12:58), Max: 36.2 (25 Aug 2021 06:12)  T(F): 97 (25 Aug 2021 12:58), Max: 97.2 (25 Aug 2021 06:12)  HR: 87 (25 Aug 2021 12:58) (77 - 87)  BP: 100/56 (25 Aug 2021 12:58) (100/56 - 131/59)  BP(mean): --  RR: 18 (25 Aug 2021 12:58) (18 - 18)  SpO2: 96% (25 Aug 2021 13:26) (93% - 96%)      Consultant(s) Notes Reviewed:  [X] YES  [ ] NO  Care Discussed with Consultants/Other Providers [X] YES  [ ] NO  Imaging Personally Reviewed:  [X] YES  [ ] NO      LABS:                        10.4   7.07  )-----------( 112      ( 25 Aug 2021 06:48 )             31.0     08-25    140  |  107  |  17  ----------------------------<  119<H>  4.0   |  22  |  0.9    Ca    8.5      25 Aug 2021 06:48  Mg     2.0     08-25    TPro  5.6<L>  /  Alb  3.3<L>  /  TBili  0.5  /  DBili  x   /  AST  12  /  ALT  <5  /  AlkPhos  75  08-25          CAPILLARY BLOOD GLUCOSE      POCT Blood Glucose.: 130 mg/dL (25 Aug 2021 16:41)  POCT Blood Glucose.: 136 mg/dL (25 Aug 2021 12:11)  POCT Blood Glucose.: 112 mg/dL (25 Aug 2021 07:37)  POCT Blood Glucose.: 97 mg/dL (24 Aug 2021 21:23)

## 2021-08-25 NOTE — PROGRESS NOTE ADULT - ASSESSMENT
89 yo M PMHx of HTN, DM II, CAD s/p CABG, CVA, and Parkinson's disease was brought to ED for right hip after unwitnessed fall which occurred two days before presentation. Was found by his daughter on the floor - was awake and alert when he was found about 30-60 mins after the fall. The patient fell onto his right side and he has been unable to bear weight since due to rt hip pain. Family and pt isn't sure if mechanical or syncopal    Unwitnessed fall, presumably syncope  Resulting in right hip fracture  - as per ortho, no surgical intervention, WBAT  - pain control  - echo pending  - EKG showed atrial fibrillation, LBBB, old.   - Head CT showed no acute pathology, Mild chronic microvascular changes and has chronic infarcts of the left inferior cerebellar hemisphere and right medial thalamus.  - ortho static positive which is expected given Parkinson's    Acute right intertrochanteric fracture w/ mild comminution  - CT Acute right intertrochanteric fracture with mild comminution and mild displacement at the greater trochanter.  - Orthopedic recommended WBAT  of right leg, PT, no surgical intervention at this point.     Chronic Atrial Fibrillation/Flutter with RVR:   - [id number]  - Continue Metoprolol, increased to 50mg daily to control the rate.   - Start on Eliquis 5mg BID, family agreed as per EMR.     CAD s/p CABG: Continue ASA, Metoprolol and Lipitor.   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 Not on anticoagulation but Eliquis started on this admission    DM II   - FS controlled    CKD III  - stable renal function.    Normocytic anemia & thrombocytopenia  Iron and folate deficiency  Continue ferrous sulfate and folic acid.     Parkinson's disease  - on Sinemet.     BPH  - on Tamsulosin and Finasteride.     #Progress Note Handoff:  Pending (specify): physical therapy, possible placement, echo  Family discussion: Discussed with his grand daughter Flor-still concerned about MRI-CM requested ortho to speak with her. I called ortho but line persistently busy. Disposition: Home vs STR.

## 2021-08-25 NOTE — PROGRESS NOTE ADULT - ASSESSMENT
89 year old male patient with past medical history of type 2 DM, CAD s/p CABG, chronic afib, bilateral thromboembolic stroke in 2020, Parkinson's disease, Iron deficiency anemia, folic acid deficiency, benign prostatic hyperplasia, came in for fall about 2 days before presentation. Was found by his daughter on the floor - was awake and alert when he was found about 30-60 mins after the fall. The patient fell onto his right side and he has been unable to bear weight since due to rt hip pain (aching/sharp, radiating distally, moderate to severe, alleviated by rest and aggravated by movement).      # Unwitnessed Fall :  - CT head was negative for any IC pathology   - EKG showed Afib with RVR, LBBB old   - TTE ( last EF 53% on june 2020 / no other valvular abnormalities ), repeat TTE ordered   - Trops: 0.03 > 0.04 > 0.04     #Acute right intertrochanteric fracture w/ mild comminution  -CT Acute right intertrochanteric fracture with mild comminution and mild displacement at the greater trochanter.  -Orthopedic recommended NWB of right leg for at least 2 weeks --> no surgical intervention at this point.   -MRI pelvis with no contrast ordered to r/o intertrochanteric extension --> if intertrochanteric fracture will consider ORIF    - PT on board       # bilateral thromboembolic stroke:  - in june 2020 most likely secondary to his afib  - patient was supposed to f/u with neurology as OP but he never followed up with them to check if he is a candidate for AC  - does not have any cardiologist   - started on Eliquis 5mg BID   - c/w aspirin + statins     # Chronic afib:  - c/w metoprolol ER 50mg OD   - on eliquis 5mg BID     # Parkinson's disease   - Continue Carbidopa / levodopa 25/100 TID     # CAD s/p CABG   - Continue Aspirin 81 mg qd + Lipitor 40 mg qhs + metoprolol       # Normocytic Anemia, and thrmobocytopenia   # Thrombocytopenia   - PLT 97   - no signs of bleed   - Continue ferrous sulfate and folic acid    # Benign prostatic hyperplasia   - Continue Flomax 0.4 mg qhs + Finasteride 5 mg qd     # GERD  - switch from dexilant to Protonix 40 mg qhs in hospital     # Miscellaneous   - DVT prophylaxis : lovenox    - GI prophylaxis : Protonix 40 mg qd   - Activity : bedrest for now pending ortho eval  - Diet : Dysphagia          Electronic Signatures:  Vj Badillo)  (Signed 24-Aug-2021 14:02)  	Authored: Progress Note, Reason for Admission, Subjective and Objective, Assessment and Plan      Last Updated: 24-Aug-2021 14:02 by Vj Badillo)

## 2021-08-26 LAB
ALBUMIN SERPL ELPH-MCNC: 3.3 G/DL — LOW (ref 3.5–5.2)
ALP SERPL-CCNC: 86 U/L — SIGNIFICANT CHANGE UP (ref 30–115)
ALT FLD-CCNC: <5 U/L — SIGNIFICANT CHANGE UP (ref 0–41)
ANION GAP SERPL CALC-SCNC: 13 MMOL/L — SIGNIFICANT CHANGE UP (ref 7–14)
AST SERPL-CCNC: 15 U/L — SIGNIFICANT CHANGE UP (ref 0–41)
BASOPHILS # BLD AUTO: 0.03 K/UL — SIGNIFICANT CHANGE UP (ref 0–0.2)
BASOPHILS NFR BLD AUTO: 0.4 % — SIGNIFICANT CHANGE UP (ref 0–1)
BILIRUB SERPL-MCNC: 0.5 MG/DL — SIGNIFICANT CHANGE UP (ref 0.2–1.2)
BUN SERPL-MCNC: 20 MG/DL — SIGNIFICANT CHANGE UP (ref 10–20)
CALCIUM SERPL-MCNC: 8.9 MG/DL — SIGNIFICANT CHANGE UP (ref 8.5–10.1)
CHLORIDE SERPL-SCNC: 106 MMOL/L — SIGNIFICANT CHANGE UP (ref 98–110)
CO2 SERPL-SCNC: 22 MMOL/L — SIGNIFICANT CHANGE UP (ref 17–32)
CREAT SERPL-MCNC: 1 MG/DL — SIGNIFICANT CHANGE UP (ref 0.7–1.5)
EOSINOPHIL # BLD AUTO: 0.2 K/UL — SIGNIFICANT CHANGE UP (ref 0–0.7)
EOSINOPHIL NFR BLD AUTO: 2.7 % — SIGNIFICANT CHANGE UP (ref 0–8)
GLUCOSE BLDC GLUCOMTR-MCNC: 102 MG/DL — HIGH (ref 70–99)
GLUCOSE BLDC GLUCOMTR-MCNC: 114 MG/DL — HIGH (ref 70–99)
GLUCOSE BLDC GLUCOMTR-MCNC: 146 MG/DL — HIGH (ref 70–99)
GLUCOSE BLDC GLUCOMTR-MCNC: 150 MG/DL — HIGH (ref 70–99)
GLUCOSE SERPL-MCNC: 99 MG/DL — SIGNIFICANT CHANGE UP (ref 70–99)
HCT VFR BLD CALC: 30.7 % — LOW (ref 42–52)
HGB BLD-MCNC: 10.1 G/DL — LOW (ref 14–18)
IMM GRANULOCYTES NFR BLD AUTO: 0.3 % — SIGNIFICANT CHANGE UP (ref 0.1–0.3)
LYMPHOCYTES # BLD AUTO: 1.66 K/UL — SIGNIFICANT CHANGE UP (ref 1.2–3.4)
LYMPHOCYTES # BLD AUTO: 22.3 % — SIGNIFICANT CHANGE UP (ref 20.5–51.1)
MAGNESIUM SERPL-MCNC: 2 MG/DL — SIGNIFICANT CHANGE UP (ref 1.8–2.4)
MCHC RBC-ENTMCNC: 29.9 PG — SIGNIFICANT CHANGE UP (ref 27–31)
MCHC RBC-ENTMCNC: 32.9 G/DL — SIGNIFICANT CHANGE UP (ref 32–37)
MCV RBC AUTO: 90.8 FL — SIGNIFICANT CHANGE UP (ref 80–94)
MONOCYTES # BLD AUTO: 0.59 K/UL — SIGNIFICANT CHANGE UP (ref 0.1–0.6)
MONOCYTES NFR BLD AUTO: 7.9 % — SIGNIFICANT CHANGE UP (ref 1.7–9.3)
NEUTROPHILS # BLD AUTO: 4.93 K/UL — SIGNIFICANT CHANGE UP (ref 1.4–6.5)
NEUTROPHILS NFR BLD AUTO: 66.4 % — SIGNIFICANT CHANGE UP (ref 42.2–75.2)
NRBC # BLD: 0 /100 WBCS — SIGNIFICANT CHANGE UP (ref 0–0)
PLATELET # BLD AUTO: 122 K/UL — LOW (ref 130–400)
POTASSIUM SERPL-MCNC: 4.3 MMOL/L — SIGNIFICANT CHANGE UP (ref 3.5–5)
POTASSIUM SERPL-SCNC: 4.3 MMOL/L — SIGNIFICANT CHANGE UP (ref 3.5–5)
PROT SERPL-MCNC: 5.7 G/DL — LOW (ref 6–8)
RBC # BLD: 3.38 M/UL — LOW (ref 4.7–6.1)
RBC # FLD: 13 % — SIGNIFICANT CHANGE UP (ref 11.5–14.5)
SODIUM SERPL-SCNC: 141 MMOL/L — SIGNIFICANT CHANGE UP (ref 135–146)
WBC # BLD: 7.43 K/UL — SIGNIFICANT CHANGE UP (ref 4.8–10.8)
WBC # FLD AUTO: 7.43 K/UL — SIGNIFICANT CHANGE UP (ref 4.8–10.8)

## 2021-08-26 PROCEDURE — 93306 TTE W/DOPPLER COMPLETE: CPT | Mod: 26

## 2021-08-26 PROCEDURE — 99232 SBSQ HOSP IP/OBS MODERATE 35: CPT

## 2021-08-26 PROCEDURE — 72195 MRI PELVIS W/O DYE: CPT | Mod: 26

## 2021-08-26 RX ADMIN — ATORVASTATIN CALCIUM 40 MILLIGRAM(S): 80 TABLET, FILM COATED ORAL at 21:57

## 2021-08-26 RX ADMIN — Medication 2 CAPSULE(S): at 12:08

## 2021-08-26 RX ADMIN — Medication 325 MILLIGRAM(S): at 21:57

## 2021-08-26 RX ADMIN — CHLORHEXIDINE GLUCONATE 1 APPLICATION(S): 213 SOLUTION TOPICAL at 05:39

## 2021-08-26 RX ADMIN — Medication 2 CAPSULE(S): at 19:07

## 2021-08-26 RX ADMIN — Medication 1 MILLIGRAM(S): at 12:08

## 2021-08-26 RX ADMIN — APIXABAN 5 MILLIGRAM(S): 2.5 TABLET, FILM COATED ORAL at 19:07

## 2021-08-26 RX ADMIN — Medication 50 MILLIGRAM(S): at 06:00

## 2021-08-26 RX ADMIN — CARBIDOPA AND LEVODOPA 1 TABLET(S): 25; 100 TABLET ORAL at 15:19

## 2021-08-26 RX ADMIN — TAMSULOSIN HYDROCHLORIDE 0.4 MILLIGRAM(S): 0.4 CAPSULE ORAL at 21:57

## 2021-08-26 RX ADMIN — CARBIDOPA AND LEVODOPA 1 TABLET(S): 25; 100 TABLET ORAL at 06:01

## 2021-08-26 RX ADMIN — Medication 2 CAPSULE(S): at 08:23

## 2021-08-26 RX ADMIN — Medication 81 MILLIGRAM(S): at 12:09

## 2021-08-26 RX ADMIN — Medication 325 MILLIGRAM(S): at 15:19

## 2021-08-26 RX ADMIN — CARBIDOPA AND LEVODOPA 1 TABLET(S): 25; 100 TABLET ORAL at 21:57

## 2021-08-26 RX ADMIN — POLYETHYLENE GLYCOL 3350 17 GRAM(S): 17 POWDER, FOR SOLUTION ORAL at 19:07

## 2021-08-26 RX ADMIN — MORPHINE SULFATE 2 MILLIGRAM(S): 50 CAPSULE, EXTENDED RELEASE ORAL at 21:57

## 2021-08-26 RX ADMIN — APIXABAN 5 MILLIGRAM(S): 2.5 TABLET, FILM COATED ORAL at 06:01

## 2021-08-26 RX ADMIN — FINASTERIDE 5 MILLIGRAM(S): 5 TABLET, FILM COATED ORAL at 12:08

## 2021-08-26 RX ADMIN — GABAPENTIN 300 MILLIGRAM(S): 400 CAPSULE ORAL at 12:08

## 2021-08-26 RX ADMIN — PANTOPRAZOLE SODIUM 40 MILLIGRAM(S): 20 TABLET, DELAYED RELEASE ORAL at 06:01

## 2021-08-26 RX ADMIN — Medication 325 MILLIGRAM(S): at 06:00

## 2021-08-26 RX ADMIN — Medication 10 MILLIGRAM(S): at 21:57

## 2021-08-26 NOTE — PROGRESS NOTE ADULT - SUBJECTIVE AND OBJECTIVE BOX
CHIEF COMPLAINT:    Patient is a 89y old  Male who presents with a chief complaint of fall     INTERVAL HPI/OVERNIGHT EVENTS:    Patient seen and examined at bedside. No acute overnight events occurred.    ROS: Reports pain with movement. All other systems are negative.    Vital Signs:    T(F): 96.9 (21 @ 12:56), Max: 97.2 (21 @ 20:24)  HR: 84 (21 @ 12:56) (74 - 127)  BP: 120/64 (21 @ 12:56) (120/64 - 152/61)  RR: 18 (21 @ 12:56) (16 - 18)  SpO2: 93% (21 @ 19:54) (93% - 93%)  I&O's Summary    25 Aug 2021 07:01  -  26 Aug 2021 07:00  --------------------------------------------------------  IN: 730 mL / OUT: 1430 mL / NET: -700 mL    26 Aug 2021 07:01  -  26 Aug 2021 17:09  --------------------------------------------------------  IN: 570 mL / OUT: 400 mL / NET: 170 mL      Daily     Daily Weight in k.9 (26 Aug 2021 05:01)  CAPILLARY BLOOD GLUCOSE      POCT Blood Glucose.: 146 mg/dL (26 Aug 2021 16:30)  POCT Blood Glucose.: 114 mg/dL (26 Aug 2021 12:15)  POCT Blood Glucose.: 102 mg/dL (26 Aug 2021 07:57)  POCT Blood Glucose.: 106 mg/dL (25 Aug 2021 21:34)      PHYSICAL EXAM:  GENERAL:  NAD  SKIN: No rashes or lesions  HEENT: Atraumatic. Normocephalic. Anicteric  NECK:  No JVD.   PULMONARY: Clear to ausculation bilaterally. No wheezing. No rales  CVS: Normal S1, S2. Regular rate and rhythm. No murmurs.  ABDOMEN/GI: Soft, Nontender, Nondistended; Bowel sounds are present  EXTREMITIES:  No edema B/L LE.  NEUROLOGIC: pill rolling tremor  PSYCH: Alert & oriented x 3, normal affect    Consultant(s) Notes Reviewed:  [x ] YES  [ ] NO      LABS:                        10.1   7.43  )-----------( 122      ( 26 Aug 2021 05:40 )             30.7         141  |  106  |  20  ----------------------------<  99  4.3   |  22  |  1.0    Ca    8.9      26 Aug 2021 05:40  Mg     2.0         TPro  5.7<L>  /  Alb  3.3<L>  /  TBili  0.5  /  DBili  x   /  AST  15  /  ALT  <5  /  AlkPhos  86      RADIOLOGY & ADDITIONAL TESTS:  Imaging or report Personally Reviewed:  [ ] YES  [ ] NO    Telemetry reviewed independently - afib    Medications:  Standing  apixaban 5 milliGRAM(s) Oral two times a day  aspirin enteric coated 81 milliGRAM(s) Oral daily  atorvastatin 40 milliGRAM(s) Oral at bedtime  bisacodyl 10 milliGRAM(s) Oral at bedtime  carbidopa/levodopa  25/100 1 Tablet(s) Oral three times a day  chlorhexidine 4% Liquid 1 Application(s) Topical <User Schedule>  ferrous    sulfate 325 milliGRAM(s) Oral three times a day  finasteride 5 milliGRAM(s) Oral daily  folic acid 1 milliGRAM(s) Oral daily  gabapentin 300 milliGRAM(s) Oral daily  metoprolol succinate ER 50 milliGRAM(s) Oral daily  pancrelipase  (CREON 12,000 Lipase Units) 2 Capsule(s) Oral three times a day with meals  pantoprazole    Tablet 40 milliGRAM(s) Oral before breakfast  polyethylene glycol 3350 17 Gram(s) Oral two times a day  tamsulosin 0.4 milliGRAM(s) Oral at bedtime    PRN Meds  acetaminophen   Tablet .. 650 milliGRAM(s) Oral every 6 hours PRN  melatonin 3 milliGRAM(s) Oral at bedtime PRN  morphine  - Injectable 2 milliGRAM(s) IV Push every 4 hours PRN  ondansetron Injectable 4 milliGRAM(s) IV Push every 8 hours PRN      Case discussed with resident  Care discussed with pt

## 2021-08-26 NOTE — PROGRESS NOTE ADULT - ASSESSMENT
89 year old male patient with past medical history of type 2 DM, CAD s/p CABG, chronic afib, bilateral thromboembolic stroke in 2020, Parkinson's disease, Iron deficiency anemia, folic acid deficiency, benign prostatic hyperplasia, came in for fall about 2 days before presentation. Was found by his daughter on the floor - was awake and alert when he was found about 30-60 mins after the fall. The patient fell onto his right side and he has been unable to bear weight since due to rt hip pain (aching/sharp, radiating distally, moderate to severe, alleviated by rest and aggravated by movement).      # Unwitnessed Fall :  - CT head was negative for any IC pathology   - EKG showed Afib with RVR, LBBB old   - TTE ( last EF 53% on june 2020 / no other valvular abnormalities ), repeat TTE ordered   - Trops: 0.03 > 0.04 > 0.04     #Acute right intertrochanteric fracture w/ mild comminution  -CT Acute right intertrochanteric fracture with mild comminution and mild displacement at the greater trochanter.  -Orthopedic recommended NWB of right leg for at least 2 weeks --> no surgical intervention at this point.   -MRI pelvis with no contrast ordered to r/o intertrochanteric extension --> if intertrochanteric fracture will consider ORIF    - PT on board       # bilateral thromboembolic stroke:  - in june 2020 most likely secondary to his afib  - patient was supposed to f/u with neurology as OP but he never followed up with them to check if he is a candidate for AC  - does not have any cardiologist   - c/w  Eliquis 5mg BID   - c/w aspirin + statins     # Chronic afib:  - c/w metoprolol ER 50mg OD   - c/w eliquis 5mg BID     # Parkinson's disease   - Continue Carbidopa / levodopa 25/100 TID     # CAD s/p CABG   - Continue Aspirin 81 mg qd + Lipitor 40 mg qhs + metoprolol       # Normocytic Anemia, and thrmobocytopenia   # Thrombocytopenia   - PLT 97   - no signs of bleed   - Continue ferrous sulfate and folic acid    # Benign prostatic hyperplasia   - Continue Flomax 0.4 mg qhs + Finasteride 5 mg qd     # GERD  - switch from dexilant to Protonix 40 mg qhs in hospital     # Miscellaneous   - DVT prophylaxis : lovenox    - GI prophylaxis : Protonix 40 mg qd   - Activity : bedrest for now pending ortho eval  - Diet : Dysphagia

## 2021-08-26 NOTE — PROGRESS NOTE ADULT - ASSESSMENT
89 yo M PMHx of HTN, DM II, CAD s/p CABG, CVA, and Parkinson's disease was brought to ED for right hip after unwitnessed fall which occurred two days before presentation. Was found by his daughter on the floor - was awake and alert when he was found about 30-60 mins after the fall. The patient fell onto his right side and he has been unable to bear weight since due to rt hip pain. Family and pt isn't sure if mechanical or syncopal    Unwitnessed fall, presumably syncope  Resulting in right hip fracture  - as per ortho, no surgical intervention, WBAT  - pain control  - echo shows septal dysergy, multiple valve dysfuction. Case d/w granddaughter who aids in decision making who agrees no cardiac intervention. Will d/c telemetry  - EKG showed atrial fibrillation, LBBB, old.   - Head CT showed no acute pathology, Mild chronic microvascular changes and has chronic infarcts of the left inferior cerebellar hemisphere and right medial thalamus.  - ortho static positive which is expected given Parkinson's    Acute right intertrochanteric fracture w/ mild comminution  - CT Acute right intertrochanteric fracture with mild comminution and mild displacement at the greater trochanter.  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   - Start on Eliquis 5mg BID, family agreed as per EMR.     CAD s/p CABG: Continue ASA, Metoprolol and Lipitor.   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 Not on anticoagulation but Eliquis started on this admission    DM II   - FS controlled    CKD III  - stable renal function.    Normocytic anemia & thrombocytopenia  Iron and folate deficiency  Continue ferrous sulfate and folic acid.     Parkinson's disease  - on Sinemet.     BPH  - on Tamsulosin and Finasteride.     #Progress Note Handoff:  Pending (specify): MRI  Family discussion: discussed MRI, NWB, valve dysfunction (aware this may contribute to syncope, agree with no further cardiac intervention given advanced age and PD) Disposition: Home vs STR.

## 2021-08-26 NOTE — PROGRESS NOTE ADULT - SUBJECTIVE AND OBJECTIVE BOX
ALEXY PURDY 89y Male      Patient is a 89y old  Male who presents with a chief complaint of fall (25 Aug 2021 19:22)        INTERVAL HPI/OVERNIGHT EVENTS: No acute events overnight. Pt speaks Malian, but with . Pt complaining of rt hip pain.       PHYSICAL EXAM:  GENERAL: NAD  HEAD:  Normocephalic  EYES:  conjunctiva and sclera clear  ENMT: Moist mucous membranes  NECK: Supple  NERVOUS SYSTEM:  Alert, awake, oriented x 4  CHEST/LUNG: Good air exchange bilaterally, no wheeze  HEART: Regular rate and rhythm  No LLE         Vital Signs Last 24 Hrs  T(C): 35.9 (26 Aug 2021 05:01), Max: 36.2 (25 Aug 2021 20:24)  T(F): 96.6 (26 Aug 2021 05:01), Max: 97.2 (25 Aug 2021 20:24)  HR: 74 (26 Aug 2021 05:01) (74 - 127)  BP: 127/63 (26 Aug 2021 05:01) (100/56 - 152/61)  BP(mean): --  RR: 16 (26 Aug 2021 05:01) (16 - 18)  SpO2: 93% (25 Aug 2021 19:54) (93% - 96%)      Consultant(s) Notes Reviewed:  [X] YES  [ ] NO  Care Discussed with Consultants/Other Providers [X] YES  [ ] NO  Imaging Personally Reviewed:  [X] YES  [ ] NO      LABS:                        10.1   7.43  )-----------( 122      ( 26 Aug 2021 05:40 )             30.7     08-26    141  |  106  |  20  ----------------------------<  99  4.3   |  22  |  1.0    Ca    8.9      26 Aug 2021 05:40  Mg     2.0     08-26    TPro  5.7<L>  /  Alb  3.3<L>  /  TBili  0.5  /  DBili  x   /  AST  15  /  ALT  <5  /  AlkPhos  86  08-26          CAPILLARY BLOOD GLUCOSE      POCT Blood Glucose.: 102 mg/dL (26 Aug 2021 07:57)  POCT Blood Glucose.: 106 mg/dL (25 Aug 2021 21:34)  POCT Blood Glucose.: 130 mg/dL (25 Aug 2021 16:41)  POCT Blood Glucose.: 136 mg/dL (25 Aug 2021 12:11)

## 2021-08-27 LAB
GLUCOSE BLDC GLUCOMTR-MCNC: 122 MG/DL — HIGH (ref 70–99)
GLUCOSE BLDC GLUCOMTR-MCNC: 129 MG/DL — HIGH (ref 70–99)
GLUCOSE BLDC GLUCOMTR-MCNC: 140 MG/DL — HIGH (ref 70–99)

## 2021-08-27 PROCEDURE — 99233 SBSQ HOSP IP/OBS HIGH 50: CPT

## 2021-08-27 RX ORDER — ENOXAPARIN SODIUM 100 MG/ML
70 INJECTION SUBCUTANEOUS
Refills: 0 | Status: DISCONTINUED | OUTPATIENT
Start: 2021-08-27 | End: 2021-08-28

## 2021-08-27 RX ADMIN — FINASTERIDE 5 MILLIGRAM(S): 5 TABLET, FILM COATED ORAL at 11:07

## 2021-08-27 RX ADMIN — Medication 2 CAPSULE(S): at 08:09

## 2021-08-27 RX ADMIN — APIXABAN 5 MILLIGRAM(S): 2.5 TABLET, FILM COATED ORAL at 07:11

## 2021-08-27 RX ADMIN — Medication 81 MILLIGRAM(S): at 11:07

## 2021-08-27 RX ADMIN — ENOXAPARIN SODIUM 70 MILLIGRAM(S): 100 INJECTION SUBCUTANEOUS at 17:08

## 2021-08-27 RX ADMIN — CARBIDOPA AND LEVODOPA 1 TABLET(S): 25; 100 TABLET ORAL at 21:58

## 2021-08-27 RX ADMIN — MORPHINE SULFATE 2 MILLIGRAM(S): 50 CAPSULE, EXTENDED RELEASE ORAL at 21:58

## 2021-08-27 RX ADMIN — CHLORHEXIDINE GLUCONATE 1 APPLICATION(S): 213 SOLUTION TOPICAL at 07:11

## 2021-08-27 RX ADMIN — Medication 2 CAPSULE(S): at 17:08

## 2021-08-27 RX ADMIN — ATORVASTATIN CALCIUM 40 MILLIGRAM(S): 80 TABLET, FILM COATED ORAL at 21:58

## 2021-08-27 RX ADMIN — Medication 50 MILLIGRAM(S): at 07:15

## 2021-08-27 RX ADMIN — POLYETHYLENE GLYCOL 3350 17 GRAM(S): 17 POWDER, FOR SOLUTION ORAL at 17:08

## 2021-08-27 RX ADMIN — Medication 325 MILLIGRAM(S): at 13:57

## 2021-08-27 RX ADMIN — Medication 2 CAPSULE(S): at 11:30

## 2021-08-27 RX ADMIN — Medication 325 MILLIGRAM(S): at 07:11

## 2021-08-27 RX ADMIN — TAMSULOSIN HYDROCHLORIDE 0.4 MILLIGRAM(S): 0.4 CAPSULE ORAL at 21:59

## 2021-08-27 RX ADMIN — Medication 325 MILLIGRAM(S): at 21:58

## 2021-08-27 RX ADMIN — PANTOPRAZOLE SODIUM 40 MILLIGRAM(S): 20 TABLET, DELAYED RELEASE ORAL at 07:10

## 2021-08-27 RX ADMIN — Medication 10 MILLIGRAM(S): at 21:59

## 2021-08-27 RX ADMIN — CARBIDOPA AND LEVODOPA 1 TABLET(S): 25; 100 TABLET ORAL at 13:57

## 2021-08-27 RX ADMIN — CARBIDOPA AND LEVODOPA 1 TABLET(S): 25; 100 TABLET ORAL at 07:10

## 2021-08-27 RX ADMIN — Medication 1 MILLIGRAM(S): at 11:07

## 2021-08-27 RX ADMIN — GABAPENTIN 300 MILLIGRAM(S): 400 CAPSULE ORAL at 11:07

## 2021-08-27 RX ADMIN — POLYETHYLENE GLYCOL 3350 17 GRAM(S): 17 POWDER, FOR SOLUTION ORAL at 07:13

## 2021-08-27 NOTE — PROGRESS NOTE ADULT - ASSESSMENT
89 year old male patient with past medical history of type 2 DM, CAD s/p CABG, chronic afib, bilateral thromboembolic stroke in 2020, Parkinson's disease, Iron deficiency anemia, folic acid deficiency, benign prostatic hyperplasia, came in for fall about 2 days before presentation. Was found by his daughter on the floor - was awake and alert when he was found about 30-60 mins after the fall. The patient fell onto his right side and he has been unable to bear weight since due to rt hip pain (aching/sharp, radiating distally, moderate to severe, alleviated by rest and aggravated by movement).    # Unwitnessed Fall :  - CT head was negative for any IC pathology   - EKG showed Afib with RVR, LBBB old   - TTE ( last EF 53% on june 2020 / no other valvular abnormalities )  - Repeat TTE ordered no significant change   - Trops: 0.03 > 0.04 > 0.04     #Acute right intertrochanteric fracture w/ mild comminution  -CT Acute right intertrochanteric fracture with mild comminution and mild displacement at the greater trochanter.  -Orthopedic recommended NWB of right leg for at least 2 weeks --> no surgical intervention at this point.   -MRI pelvis with no contrast ordered to r/o intertrochanteric extension --> if intertrochanteric fracture will consider ORIF    - PT on board       # bilateral thromboembolic stroke:  - in june 2020 most likely secondary to his afib  - patient was supposed to f/u with neurology as OP but he never followed up with them to check if he is a candidate for AC  - does not have any cardiologist   - c/w  Eliquis 5mg BID   - c/w aspirin + statins     # Chronic afib:  - c/w metoprolol ER 50mg OD   - c/w eliquis 5mg BID     # Parkinson's disease   - Continue Carbidopa / levodopa 25/100 TID     # CAD s/p CABG   - Continue Aspirin 81 mg qd + Lipitor 40 mg qhs + metoprolol       # Normocytic Anemia, and thrmobocytopenia   # Thrombocytopenia   - PLT 97   - no signs of bleed   - Continue ferrous sulfate and folic acid    # Benign prostatic hyperplasia   - Continue Flomax 0.4 mg qhs + Finasteride 5 mg qd     # GERD  - switch from dexilant to Protonix 40 mg qhs in hospital     # Miscellaneous   - DVT prophylaxis : lovenox    - GI prophylaxis : Protonix 40 mg qd   - Activity : bedrest for now pending ortho eval  - Diet : Dysphagia    89 year old male patient with past medical history of type 2 DM, CAD s/p CABG, chronic afib, bilateral thromboembolic stroke in 2020, Parkinson's disease, Iron deficiency anemia, folic acid deficiency, benign prostatic hyperplasia, came in for fall about 2 days before presentation. Was found by his daughter on the floor - was awake and alert when he was found about 30-60 mins after the fall. The patient fell onto his right side and he has been unable to bear weight since due to rt hip pain (aching/sharp, radiating distally, moderate to severe, alleviated by rest and aggravated by movement).    # Unwitnessed Fall :  - CT head was negative for any IC pathology   - EKG showed Afib with RVR, LBBB old   - TTE ( last EF 53% on june 2020 / no other valvular abnormalities )  - Repeat  echo shows septal dysergy, multiple valve dysfunction. Case was discussed with  granddaughter who aids in decision making who agrees no cardiac intervention.   - Trops: 0.03 > 0.04 > 0.04     #Acute right intertrochanteric fracture w/ mild comminution  -CT Acute right intertrochanteric fracture with mild comminution and mild displacement at the greater trochanter.  -Orthopedic recommended NWB of right leg for at least 2 weeks --> no surgical intervention at this point.   -MRI pelvis was done and ortho was informed--> if intertrochanteric fracture will consider ORIF    - PT on board     # Bilateral thromboembolic stroke:  - in june 2020 most likely secondary to his afib  - patient was supposed to f/u with neurology as OP but he never followed up with them to check if he is a candidate for AC  - does not have any cardiologist   - c/w  Eliquis 5mg BID   - c/w aspirin + statins     # Chronic afib:  - c/w metoprolol ER 50mg OD   - c/w eliquis 5mg BID     # Parkinson's disease   - Continue Carbidopa / levodopa 25/100 TID     # CAD s/p CABG   - Continue Aspirin 81 mg qd + Lipitor 40 mg qhs + metoprolol     # Normocytic Anemia, and thrombocytopenia   - PLT 97 > 122 now   - no signs of bleed   - Continue ferrous sulfate and folic acid    # Benign prostatic hyperplasia   - Continue Flomax 0.4 mg qhs + Finasteride 5 mg qd     # GERD  - switch from dexilant to Protonix 40 mg qhs in hospital     # Miscellaneous   - DVT prophylaxis : lovenox    - GI prophylaxis : Protonix 40 mg qd   - Activity : bedrest for now pending ortho eval  - Diet : Dysphagia 1   89 year old male patient with past medical history of type 2 DM, CAD s/p CABG, chronic afib, bilateral thromboembolic stroke in 2020, Parkinson's disease, Iron deficiency anemia, folic acid deficiency, benign prostatic hyperplasia, came in for fall about 2 days before presentation. Was found by his daughter on the floor - was awake and alert when he was found about 30-60 mins after the fall. The patient fell onto his right side and he has been unable to bear weight since due to rt hip pain (aching/sharp, radiating distally, moderate to severe, alleviated by rest and aggravated by movement).    # Unwitnessed Fall :  - CT head was negative for any IC pathology   - EKG showed Afib with RVR, LBBB old   - TTE ( last EF 53% on june 2020 / no other valvular abnormalities )  - Repeat  echo shows septal dysergy, multiple valve dysfunction. Case was discussed with  granddaughter who aids in decision making who agrees no cardiac intervention.   - Trops: 0.03 > 0.04 > 0.04     #Acute right intertrochanteric fracture w/ mild comminution  -CT Acute right intertrochanteric fracture with mild comminution and mild displacement at the greater trochanter.  -Orthopedic recommended NWB of right leg for at least 2 weeks --> no surgical intervention at this point.   -MRI pelvis was done and ortho was informed--> if intertrochanteric fracture will consider ORIF .   - PT on board   - Spoke to Ortho: Patient needs medical clearance for possible ORIF    # Bilateral thromboembolic stroke:  - in june 2020 most likely secondary to his afib  - patient was supposed to f/u with neurology as OP but he never followed up with them to check if he is a candidate for AC  - does not have any cardiologist   - c/w  Eliquis 5mg BID   - c/w aspirin + statins     # Chronic afib:  - c/w metoprolol ER 50mg OD   - c/w eliquis 5mg BID     # Parkinson's disease   - Continue Carbidopa / levodopa 25/100 TID     # CAD s/p CABG   - Continue Aspirin 81 mg qd + Lipitor 40 mg qhs + metoprolol     # Normocytic Anemia, and thrombocytopenia   - PLT 97 > 122 now   - no signs of bleed   - Continue ferrous sulfate and folic acid    # Benign prostatic hyperplasia   - Continue Flomax 0.4 mg qhs + Finasteride 5 mg qd     # GERD  - switch from dexilant to Protonix 40 mg qhs in hospital     # Miscellaneous   - DVT prophylaxis : lovenox    - GI prophylaxis : Protonix 40 mg qd   - Activity : bedrest for now pending ortho eval  - Diet : Dysphagia 1

## 2021-08-27 NOTE — CONSULT NOTE ADULT - SUBJECTIVE AND OBJECTIVE BOX
Asked to re-evaluated patient regarding greater trochanteric fracture, regarding weight bearing.      PE: Patient poor communicator, but with [place] ...  Primarily localized pain to greater trochanter.    He has some pain with axial loading.      
  HPI:  89 year old male patient with past medical history of type 2 DM, CAD s/p CABG, chronic afib, bilateral thromboembolic stroke in 2020, Parkinson's disease, Iron deficiency anemia, folic acid deficiency, benign prostatic hyperplasia, came in for fall this morning  hx obtained from his grand daughter at the bedside. she reports that his daughter found him on the floor in the bathroom. fall happened when the patient was trying to get out of bed and was going to the bathroom while using his rolling walker. he suddenly fell down, no witnessed LOC, seizures like activity. they found him on the floor, was awake at that time. as per the grand daughter, he never had LOC in the past while he was sitting on a chair or laying in his bed but she reports that 1 or 2 days prior to the fall, he starts having decrease PO intake but she denies any diarrhea, no dysuria, no fever or chills, no cough or sob  patient was transferred to the ER, clinically and hemodynamically stable  labs showed Trop 0.03. EKG showed AFib   trauma work up showed Acute right intertrochanteric fracture with mild comminution and mild displacement at the greater trochanter  orthopedic consult placed and team made aware of these findings. they recommend to order Xray hip, x ray femur and pelvis + x ray knee  (21 Aug 2021 16:16)      ---  cardio fellow additional notes:    - use of pacific interpreters for exam  - currently without chest pain / palpitations.  clinically euvolemic.    - r hip pain.   - mostly bedbound at home / poor functional status.           PAST MEDICAL & SURGICAL HISTORY  Parkinson disease    Coronary artery disease    Chronic atrial fibrillation    Stroke, embolic    Benign prostate hyperplasia    History of iron deficiency    S/P CABG (coronary artery bypass graft)        FAMILY HISTORY:  FAMILY HISTORY:      SOCIAL HISTORY:  Social History:  ex smoker  non alcohol consumer  no illicit drug use (21 Aug 2021 16:16)      ALLERGIES:  No Known Allergies      MEDICATIONS:  aspirin enteric coated 81 milliGRAM(s) Oral daily  atorvastatin 40 milliGRAM(s) Oral at bedtime  bisacodyl 10 milliGRAM(s) Oral at bedtime  carbidopa/levodopa  25/100 1 Tablet(s) Oral three times a day  chlorhexidine 4% Liquid 1 Application(s) Topical <User Schedule>  enoxaparin Injectable 70 milliGRAM(s) SubCutaneous two times a day  ferrous    sulfate 325 milliGRAM(s) Oral three times a day  finasteride 5 milliGRAM(s) Oral daily  folic acid 1 milliGRAM(s) Oral daily  gabapentin 300 milliGRAM(s) Oral daily  metoprolol succinate ER 50 milliGRAM(s) Oral daily  pancrelipase  (CREON 12,000 Lipase Units) 2 Capsule(s) Oral three times a day with meals  pantoprazole    Tablet 40 milliGRAM(s) Oral before breakfast  polyethylene glycol 3350 17 Gram(s) Oral two times a day  tamsulosin 0.4 milliGRAM(s) Oral at bedtime    PRN:  acetaminophen   Tablet .. 650 milliGRAM(s) Oral every 6 hours PRN  melatonin 3 milliGRAM(s) Oral at bedtime PRN  morphine  - Injectable 2 milliGRAM(s) IV Push every 4 hours PRN  ondansetron Injectable 4 milliGRAM(s) IV Push every 8 hours PRN      HOME MEDICATIONS:  Home Medications:  aspirin 81 mg oral tablet: 1 tab(s) orally once a day (11 Jun 2020 15:21)  atorvastatin 40 mg oral tablet: 1 tab(s) orally once a day (21 Aug 2021 15:59)  carbidopa-levodopa 25 mg-100 mg oral tablet: 1 tab(s) orally 3 times a day (06 Jun 2020 22:09)  Creon 24,000 units oral delayed release capsule: 1 cap(s) orally 3 times a day (21 Aug 2021 16:07)  Dexilant 60 mg oral delayed release capsule: 1 cap(s) orally once a day (21 Aug 2021 16:06)  dicyclomine 10 mg oral capsule: 1 cap(s) orally once a day, As Needed (11 Jun 2020 14:56)  ferrous sulfate 325 mg (65 mg elemental iron) oral tablet: 1 tab(s) orally 3 times a day (06 Jun 2020 22:09)  finasteride 5 mg oral tablet: 1 tab(s) orally once a day (06 Jun 2020 22:09)  folic acid 1 mg oral tablet: 1 tab(s) orally once a day (06 Jun 2020 22:09)  gabapentin 300 mg oral tablet: 1 tab(s) orally once a day (21 Aug 2021 16:08)  Janumet 50 mg-500 mg oral tablet: 0.5 tab(s) orally once a day, As Needed (06 Jun 2020 22:09)  Metoprolol Succinate ER 25 mg oral tablet, extended release: 0.5 tab(s) orally once a day (21 Aug 2021 16:03)  tamsulosin 0.4 mg oral capsule: 1 cap(s) orally once a day (06 Jun 2020 22:09)  Vitamin D3 1250 mcg (50,000 intl units) oral capsule: 1 cap(s) orally once a week (21 Aug 2021 16:07)      VITALS:   T(F): 97.3 (08-27 @ 14:34), Max: 98.3 (08-26 @ 21:49)  HR: 71 (08-27 @ 14:34) (71 - 127)  BP: 119/58 (08-27 @ 14:34) (100/56 - 152/61)  BP(mean): --  RR: 18 (08-27 @ 14:34) (16 - 18)  SpO2: 97% (08-26 @ 22:21) (93% - 97%)    I&O's Summary    26 Aug 2021 07:01  -  27 Aug 2021 07:00  --------------------------------------------------------  IN: 570 mL / OUT: 400 mL / NET: 170 mL        REVIEW OF SYSTEMS:  CONSTITUTIONAL: No weakness, fevers or chills  HEENT: No visual changes, neck/ear pain  RESPIRATORY: No cough, sob  CARDIOVASCULAR: See HPI  GASTROINTESTINAL: No abdominal pain. No nausea, vomiting, diarrhea   GENITOURINARY: No dysuria, frequency or hematuria  NEUROLOGICAL: No new focal deficits  SKIN: No new rashes    PHYSICAL EXAM:  General: Not in distress.  Non-toxic appearing.   HEENT: EOMI  Cardio: irregularly irregular,  normal rate, S1, S2  Pulm: B/L BS.  No wheezing / crackles / rales  Abdomen: Soft, non-tender, non-distended. Normoactive bowel sounds  Extremities: No edema b/l le  Neuro: A&O x3. No focal deficits    LABS:                        10.1   7.43  )-----------( 122      ( 26 Aug 2021 05:40 )             30.7     08-26    141  |  106  |  20  ----------------------------<  99  4.3   |  22  |  1.0    Ca    8.9      26 Aug 2021 05:40  Mg     2.0     08-26    TPro  5.7<L>  /  Alb  3.3<L>  /  TBili  0.5  /  DBili  x   /  AST  15  /  ALT  <5  /  AlkPhos  86  08-26              Troponin trend:        COVID-19 PCR: NotDetec (25 Aug 2021 15:35)  COVID-19 PCR: NotDetec (21 Aug 2021 14:34)      RADIOLOGY:  -CXR:  -TTE:  -CCTA:  -STRESS TEST:  -CATHETERIZATION:  -OTHER:  ECG:      TELEMETRY EVENTS:
Pt Name: ALEXY PURDY  MRN: [id number]      HPI: 89yMale presents with pain in right hip. Had a fall 3 day ago, has not been able to ambulate since.  Hx obtained from granddaughter.      PAST MEDICAL & SURGICAL HISTORY:  Parkinson disease  Coronary artery disease  Chronic atrial fibrillation  Stroke, embolic  Benign prostate hyperplasia  History of iron deficiency  S/P CABG (coronary artery bypass graft)      Allergies: No Known Allergies      Medications:   acetaminophen   Tablet .. 650 milliGRAM(s) Oral every 6 hours PRN  aluminum hydroxide/magnesium hydroxide/simethicone Suspension 30 milliLiter(s) Oral every 4 hours PRN  aspirin enteric coated 81 milliGRAM(s) Oral daily  atorvastatin 40 milliGRAM(s) Oral at bedtime  bisacodyl 10 milliGRAM(s) Oral at bedtime  carbidopa/levodopa  25/100 1 Tablet(s) Oral three times a day  chlorhexidine 4% Liquid 1 Application(s) Topical <User Schedule>  enoxaparin Injectable 40 milliGRAM(s) SubCutaneous daily  ferrous    sulfate 325 milliGRAM(s) Oral three times a day  finasteride 5 milliGRAM(s) Oral daily  folic acid 1 milliGRAM(s) Oral daily  gabapentin 300 milliGRAM(s) Oral daily  melatonin 3 milliGRAM(s) Oral at bedtime PRN  metoprolol succinate ER 25 milliGRAM(s) Oral daily  morphine  - Injectable 2 milliGRAM(s) IV Push every 4 hours PRN  ondansetron Injectable 4 milliGRAM(s) IV Push every 8 hours PRN  pancrelipase  (CREON 12,000 Lipase Units) 2 Capsule(s) Oral three times a day with meals  pantoprazole  Injectable 40 milliGRAM(s) IV Push daily  polyethylene glycol 3350 17 Gram(s) Oral two times a day  sodium chloride 0.9%. 1000 milliLiter(s) IV Continuous <Continuous>  tamsulosin 0.4 milliGRAM(s) Oral at bedtime        PHYSICAL EXAM:    Vital Signs Last 24 Hrs  T(C): 36.6 (21 Aug 2021 20:52), Max: 36.6 (21 Aug 2021 20:52)  T(F): 97.9 (21 Aug 2021 20:52), Max: 97.9 (21 Aug 2021 20:52)  HR: 100 (21 Aug 2021 20:52) (82 - 100)  BP: 118/66 (21 Aug 2021 20:52) (118/66 - 127/80)  BP(mean): --  RR: 18 (21 Aug 2021 20:52) (18 - 18)  SpO2: 97% (21 Aug 2021 20:52) (97% - 98%)    Physical Exam:  General: NAD, awake    RLE:  No open skin or wounds  TTP GT, pain with hip flexion, no pain with log roll  feet wwp  Compartments soft and compressible.   No pain on passive stretch.    Labs:                        11.2   6.99  )-----------( 92       ( 21 Aug 2021 12:30 )             34.2     08-21    139  |  103  |  31<H>  ----------------------------<  90  4.7   |  21  |  1.1    Ca    9.0      21 Aug 2021 12:30    TPro  6.7  /  Alb  3.9  /  TBili  0.7  /  DBili  x   /  AST  13  /  ALT  7   /  AlkPhos  90  08-21    PT/INR - ( 21 Aug 2021 15:21 )   PT: 11.90 sec;   INR: 1.04 ratio         PTT - ( 21 Aug 2021 15:21 )  PTT:31.0 sec    Radiographs  CT of pelvis reviewed.  Fracture line most consistent with right greater trochanteric fracture without extension.

## 2021-08-27 NOTE — CONSULT NOTE ADULT - ATTENDING COMMENTS
Orthopedic Attending  Patient seen and examined.  PMHx, Psurg Hx, Medications and Allergies reviewed.  X-rays and CT reviewed.  Agree with findings, assessment and plan.  Impression; right greater trochanteric fracture amenable to continued non-operative management.  WBAT.  PT.
Pre op risk stratefication for ortho procedure.   Unwitnessed fall with R hip fx, mildly displaced - planned for ORIF with ortho Monday ([id number])  CAD sp CABG  Hx AVR bioprosthetic  Parox AF (chadsvasc 6)  PD, CVA, DM, anemia (maureen, folic acid def), BPH      Plan:  Currently clinically euvolemic, no active or recent chest pain/sob/palpitations, rate currently controlled.   Pt at high risk of periop MACE undergoing mod risk procedure.  EF is normal with no major valvular disease no need for further cardiac workup   As per Ortho note plan is for non operative management   resume AC.

## 2021-08-27 NOTE — PROGRESS NOTE ADULT - ASSESSMENT
6 yo M PMHx of HTN, DM II, CAD s/p CABG, CVA, and Parkinson's disease was brought to ED for right hip after unwitnessed fall which occurred two days before presentation. Was found by his daughter on the floor - was awake and alert when he was found about 30-60 mins after the fall. The patient fell onto his right side and he has been unable to bear weight since due to rt hip pain. Family and pt isn't sure if mechanical or syncopal    Unwitnessed fall, presumably syncope  Resulting in right hip fracture, likely due to osteopenia  Echo shows septal dysergy, multiple valve dysfuction. Case d/w granddaughter who aids in decision making who agrees no cardiac intervention. Will d/c telemetry  EKG showed atrial fibrillation, LBBB, old  s/p MRI of the hip, discussed with orthopedics team  they will discuss surgical v nonsurgical options with the family. Family is leaning more towards srugery   will ask for cardio for preop eval     Chronic Atrial Fibrillation/Flutter with RVR  [id number]  Continue Metoprolol 50 mg daily  was started on Eliquis 5mg Q12H. Will switch to therapeutic lovenox for now in case ortho and family decide on ORIF     CAD s/p CABG: Continue ASA, Metoprolol and Lipitor.   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  Eliquis started on this admission    DM II   - FS controlled    CKD III  - stable renal function.    Normocytic anemia & thrombocytopenia  Iron and folate deficiency  Continue ferrous sulfate and folic acid.     Parkinson's disease  - on Sinemet.     BPH  - on Tamsulosin and Finasteride.     #Progress Note Handoff:  Pending (specify): ortho to discuss surgical/nonsurgical options for hip fracture, cardio preop eval   Family discussion: plan of care discussed with patient and granddaughter Flor  Disposition: Home vs STR.    6 yo M PMHx of HTN, DM II, CAD s/p CABG, CVA, and Parkinson's disease was brought to ED for right hip after unwitnessed fall which occurred two days before presentation. Was found by his daughter on the floor - was awake and alert when he was found about 30-60 mins after the fall. The patient fell onto his right side and he has been unable to bear weight since due to rt hip pain. Family and pt isn't sure if mechanical or syncopal    Unwitnessed fall, presumably syncope  Resulting in right hip fracture, likely due to osteopenia  Echo shows septal dysergy, multiple valve dysfuction. Case d/w granddaughter who aids in decision making who agrees no cardiac intervention. Will d/c telemetry  EKG showed atrial fibrillation, LBBB, old  s/p MRI of the hip, discussed with orthopedics team  they will discuss surgical v nonsurgical options with the family. Family is leaning more towards surgery   Patient will be high risk for surgical procedure, however benefits of surgical intervention likely outweigh the risks   will ask for cardio for preop eval     Chronic Atrial Fibrillation/Flutter with RVR  [id number] 6  Continue Metoprolol 50 mg daily  was started on Eliquis 5mg Q12H. Will switch to therapeutic lovenox for now in case ortho and family decide on ORIF     CAD s/p CABG: Continue ASA, Metoprolol and Lipitor.   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  Eliquis started on this admission    DM II   - FS controlled    CKD III  - stable renal function.    Normocytic anemia & thrombocytopenia  Iron and folate deficiency  Continue ferrous sulfate and folic acid.     Parkinson's disease  - on Sinemet.     BPH  - on Tamsulosin and Finasteride.     #Progress Note Handoff:  Pending (specify): ortho to discuss surgical/nonsurgical options for hip fracture, cardio preop eval   Family discussion: plan of care discussed with patient and granddaughter Flor  Disposition: Home vs STR.

## 2021-08-27 NOTE — PROGRESS NOTE ADULT - SUBJECTIVE AND OBJECTIVE BOX
ALEXY PURDY  89y Male    CHIEF COMPLAINT:    Patient is a 89y old  Male who presents with a chief complaint of fall (27 Aug 2021 10:48)      INTERVAL HPI/OVERNIGHT EVENTS:    Patient seen and examined. No acute events overnight. No active complaints     ROS: All other systems are negative.    Vital Signs:    T(F): 97.3 (08-27-21 @ 14:34), Max: 98.3 (08-26-21 @ 21:49)  HR: 71 (08-27-21 @ 14:34) (71 - 88)  BP: 119/58 (08-27-21 @ 14:34) (102/55 - 119/65)  RR: 18 (08-27-21 @ 14:34) (18 - 18)  SpO2: 97% (08-26-21 @ 22:21) (97% - 97%)    26 Aug 2021 07:01  -  27 Aug 2021 07:00  --------------------------------------------------------  IN: 570 mL / OUT: 400 mL / NET: 170 mL    POCT Blood Glucose.: 122 mg/dL (27 Aug 2021 11:46)  POCT Blood Glucose.: 129 mg/dL (27 Aug 2021 08:05)  POCT Blood Glucose.: 150 mg/dL (26 Aug 2021 21:14)  POCT Blood Glucose.: 146 mg/dL (26 Aug 2021 16:30)    PHYSICAL EXAM:    GENERAL:  NAD  SKIN: No rashes or lesions  HEENT: Atraumatic. Normocephalic.   NECK: Supple, No JVD.    PULMONARY: CTA B/L. No wheezing. No rales  CVS: Normal S1, S2. Rate and Rhythm are regular.   ABDOMEN/GI: Soft, Nontender, Nondistended; BS present  MSK:  No clubbing or cyanosis   NEUROLOGIC: + resting tremor b/l UE  PSYCH: Awake and alert, answers questions appropriately     Consultant(s) Notes Reviewed:  [x ] YES  [ ] NO  Care Discussed with Consultants/Other Providers [ x] YES  [ ] NO    LABS:                        10.1   7.43  )-----------( 122      ( 26 Aug 2021 05:40 )             30.7     141  |  106  |  20  ----------------------------<  99  4.3   |  22  |  1.0    Ca    8.9      26 Aug 2021 05:40  Mg     2.0     08-26    TPro  5.7<L>  /  Alb  3.3<L>  /  TBili  0.5  /  DBili  x   /  AST  15  /  ALT  <5  /  AlkPhos  86  08-26    RADIOLOGY & ADDITIONAL TESTS:  Imaging or report Personally Reviewed:  [x] YES  [ ] NO  EKG reviewed: [x] YES  [ ] NO    Medications:  Standing  apixaban 5 milliGRAM(s) Oral two times a day  aspirin enteric coated 81 milliGRAM(s) Oral daily  atorvastatin 40 milliGRAM(s) Oral at bedtime  bisacodyl 10 milliGRAM(s) Oral at bedtime  carbidopa/levodopa  25/100 1 Tablet(s) Oral three times a day  chlorhexidine 4% Liquid 1 Application(s) Topical <User Schedule>  ferrous    sulfate 325 milliGRAM(s) Oral three times a day  finasteride 5 milliGRAM(s) Oral daily  folic acid 1 milliGRAM(s) Oral daily  gabapentin 300 milliGRAM(s) Oral daily  metoprolol succinate ER 50 milliGRAM(s) Oral daily  pancrelipase  (CREON 12,000 Lipase Units) 2 Capsule(s) Oral three times a day with meals  pantoprazole    Tablet 40 milliGRAM(s) Oral before breakfast  polyethylene glycol 3350 17 Gram(s) Oral two times a day  tamsulosin 0.4 milliGRAM(s) Oral at bedtime    PRN Meds  acetaminophen   Tablet .. 650 milliGRAM(s) Oral every 6 hours PRN  melatonin 3 milliGRAM(s) Oral at bedtime PRN  morphine  - Injectable 2 milliGRAM(s) IV Push every 4 hours PRN  ondansetron Injectable 4 milliGRAM(s) IV Push every 8 hours PRN

## 2021-08-27 NOTE — PROGRESS NOTE ADULT - SUBJECTIVE AND OBJECTIVE BOX
MRI of Right hip reviewed-showing intertrochanteric extension.   Discussed with patient and patients grand daughter Flor operative treatment with ORIF of right hip vs. non operative treatment due to it being non-displaced and patient being able to WBAT.   Family would like to discuss amongst themselves and will provide an answer tomorrow.   If family agrees to surgery the patient will be booked for Right hip ORIF on Monday 8/30/2021.   Pending medical and cardiac optimization and clearance for OR

## 2021-08-27 NOTE — CONSULT NOTE ADULT - ASSESSMENT
IMPRESSION  ·	Pre op risk stratefication for ortho procedure.   ·	Unwitnessed fall with R hip fx, mildly displaced - planned for ORIF with ortho monday ([id number])  ·	CAD sp CABG  ·	Hx AVR bioprosthetic  ·	Parox AF (chadsvasc 6)  ·	PD, CVA, DM, anemia (maureen, folic acid def), BPH  ---  METS < 4; Poor underlying functional status, mostly bedbound at home.   RCRI 2 points/class III risk with 10.1% 30d risk of death/mi/cardiac arrest  ---  Ekg:	IVCD. similar ekgs seen in june ‘21.   Echo:	8/26.  nL lvsf.  Septal dyssynergy.  Mild mr. mild mod tr. bioprosthetic in aortic position. Borderline pht (pasp 36).          PLAN  ·	Currently clinically euvolemic, no active or recent chest pain/sob/palpitations, rate currently controlled.   ·	Pt at high risk of periop MACE undergoing mod risk procedure.   ·	Continue periop asa, statin  ·	Keep euvolemic, maintain optimal rate control for AF leona-procedure.   ·	No need for further cardiac w/u prior to procedure.

## 2021-08-27 NOTE — PROGRESS NOTE ADULT - SUBJECTIVE AND OBJECTIVE BOX
----------Daily Progress Note----------    HISTORY OF PRESENT ILLNESS:  Patient is a 89y old Male who presents with a chief complaint of fall (26 Aug 2021 17:06)    Currently admitted to medicine with the primary diagnosis of Intertrochanteric fracture of right hip       Today is hospital day 6d.     INTERVAL HOSPITAL COURSE / OVERNIGHT EVENTS:    Patient was examined and seen at bedside. This morning he is resting comfortably in bed and right hip pain     Review of Systems: patient endorses right hip pain and decrease ROM.      <<<<<PAST MEDICAL & SURGICAL HISTORY>>>>>  Parkinson disease    Coronary artery disease    Chronic atrial fibrillation    Stroke, embolic    Benign prostate hyperplasia    History of iron deficiency    S/P CABG (coronary artery bypass graft)      ALLERGIES  No Known Allergies      Home Medications:  aspirin 81 mg oral tablet: 1 tab(s) orally once a day (11 Jun 2020 15:21)  atorvastatin 40 mg oral tablet: 1 tab(s) orally once a day (21 Aug 2021 15:59)  carbidopa-levodopa 25 mg-100 mg oral tablet: 1 tab(s) orally 3 times a day (06 Jun 2020 22:09)  Creon 24,000 units oral delayed release capsule: 1 cap(s) orally 3 times a day (21 Aug 2021 16:07)  Dexilant 60 mg oral delayed release capsule: 1 cap(s) orally once a day (21 Aug 2021 16:06)  dicyclomine 10 mg oral capsule: 1 cap(s) orally once a day, As Needed (11 Jun 2020 14:56)  ferrous sulfate 325 mg (65 mg elemental iron) oral tablet: 1 tab(s) orally 3 times a day (06 Jun 2020 22:09)  finasteride 5 mg oral tablet: 1 tab(s) orally once a day (06 Jun 2020 22:09)  folic acid 1 mg oral tablet: 1 tab(s) orally once a day (06 Jun 2020 22:09)  gabapentin 300 mg oral tablet: 1 tab(s) orally once a day (21 Aug 2021 16:08)  Janumet 50 mg-500 mg oral tablet: 0.5 tab(s) orally once a day, As Needed (06 Jun 2020 22:09)  Metoprolol Succinate ER 25 mg oral tablet, extended release: 0.5 tab(s) orally once a day (21 Aug 2021 16:03)  tamsulosin 0.4 mg oral capsule: 1 cap(s) orally once a day (06 Jun 2020 22:09)  Vitamin D3 1250 mcg (50,000 intl units) oral capsule: 1 cap(s) orally once a week (21 Aug 2021 16:07)        MEDICATIONS  STANDING MEDICATIONS  apixaban 5 milliGRAM(s) Oral two times a day  aspirin enteric coated 81 milliGRAM(s) Oral daily  atorvastatin 40 milliGRAM(s) Oral at bedtime  bisacodyl 10 milliGRAM(s) Oral at bedtime  carbidopa/levodopa  25/100 1 Tablet(s) Oral three times a day  chlorhexidine 4% Liquid 1 Application(s) Topical <User Schedule>  ferrous    sulfate 325 milliGRAM(s) Oral three times a day  finasteride 5 milliGRAM(s) Oral daily  folic acid 1 milliGRAM(s) Oral daily  gabapentin 300 milliGRAM(s) Oral daily  metoprolol succinate ER 50 milliGRAM(s) Oral daily  pancrelipase  (CREON 12,000 Lipase Units) 2 Capsule(s) Oral three times a day with meals  pantoprazole    Tablet 40 milliGRAM(s) Oral before breakfast  polyethylene glycol 3350 17 Gram(s) Oral two times a day  tamsulosin 0.4 milliGRAM(s) Oral at bedtime    PRN MEDICATIONS  acetaminophen   Tablet .. 650 milliGRAM(s) Oral every 6 hours PRN  melatonin 3 milliGRAM(s) Oral at bedtime PRN  morphine  - Injectable 2 milliGRAM(s) IV Push every 4 hours PRN  ondansetron Injectable 4 milliGRAM(s) IV Push every 8 hours PRN    VITALS:  T(F): 98.1  HR: 71  BP: 119/65  RR: 18  SpO2: 97%    <<<<<LABS>>>>>                        10.1   7.43  )-----------( 122      ( 26 Aug 2021 05:40 )             30.7     08-26    141  |  106  |  20  ----------------------------<  99  4.3   |  22  |  1.0    Ca    8.9      26 Aug 2021 05:40  Mg     2.0     08-26    TPro  5.7<L>  /  Alb  3.3<L>  /  TBili  0.5  /  DBili  x   /  AST  15  /  ALT  <5  /  AlkPhos  86  08-26            [id number]        <<<<<RADIOLOGY>>>>>  < from: [facility] Duplex Lower Ext Vein Scan, Bilat (08.22.21 @ 18:16) >    Impression:    No evidence ofdeep venous thrombosis or superficial thrombophlebitis in the bilateral lower extremities.    ICD-10:M79.89      --- End of Report ---    < end of copied text >    < from: Xray Femur 2 Views, Right (08.21.21 @ 19:18) >  INTERPRETATION:  Clinical History / Reason for exam: Right femoral intertrochanteric fracture    4 views of the right femur.    COMPARISON: CT bony pelvis of the same day.    Findings/  impression: The acute right greater trochanteric fracture with extension into the intertrochanteric ridge is better visualized on the same date CT bone pelvis. Diffuse osteopenia. Moderate degenerative changes of the right hip.    --- End of Report ---    < end of copied text >      < from: Xray Pelvis AP only (08.21.21 @ 19:18) >        INTERPRETATION:  Clinical History / Reason for exam: Femoral fracture    AP view of the pelvis.    COMPARISON: CT bony pelvis of the same date.    Findings/  impression: Diffuse osteopenia. Acute right greater trochanteric fracture with extension into the intertrochanteric ridge is better visualized on the CT bony pelvis. Moderate bilateral hip osteoarthritis. Degenerative changes of the sacroiliac joints and lower lumbar spine are also noted. There are vascular calcifications.    --- End of Report ---    < end of copied text >      < from: Xray Knee 1 or 2 Views, Right (08.21.21 @ 19:17) >      INTERPRETATION:  Clinical History / Reason for exam: Right femoral fracture    2 views of the right knee.    COMPARISON: None    Findings/  impression: Diffuse osteopenia. No acute displaced fracture or dislocation. Small knee joint effusion. Tricompartmental osteoarthritis, worst in the medial compartment.    --- End of Report ---    < end of copied text >      <<<<<PHYSICAL EXAM>>>>>  GENERAL: Well developed, well nourished and in no acute distress. Resting comfortably in bed.  PULMONARY: Clear to auscultation bilaterally. No rales, rhonchi, or wheezing.  CARDIOVASCULAR: Regular rate and rhythm, S1-S2, no murmurs  GASTROINTESTINAL: Soft, non-tender, non-distended, no guarding.  SKIN/EXTREMITIES: decrease ROM in the right side   NEUROLOGIC/MUSCULOSKELETAL: AOx4      -----------------------------------------------------------------------------------------------------------------------------------------------------------------------------------------------

## 2021-08-28 LAB
ALBUMIN SERPL ELPH-MCNC: 3.4 G/DL — LOW (ref 3.5–5.2)
ALP SERPL-CCNC: 94 U/L — SIGNIFICANT CHANGE UP (ref 30–115)
ALT FLD-CCNC: <5 U/L — SIGNIFICANT CHANGE UP (ref 0–41)
ANION GAP SERPL CALC-SCNC: 11 MMOL/L — SIGNIFICANT CHANGE UP (ref 7–14)
AST SERPL-CCNC: 12 U/L — SIGNIFICANT CHANGE UP (ref 0–41)
BASOPHILS # BLD AUTO: 0.03 K/UL — SIGNIFICANT CHANGE UP (ref 0–0.2)
BASOPHILS NFR BLD AUTO: 0.5 % — SIGNIFICANT CHANGE UP (ref 0–1)
BILIRUB SERPL-MCNC: 0.5 MG/DL — SIGNIFICANT CHANGE UP (ref 0.2–1.2)
BUN SERPL-MCNC: 20 MG/DL — SIGNIFICANT CHANGE UP (ref 10–20)
CALCIUM SERPL-MCNC: 8.9 MG/DL — SIGNIFICANT CHANGE UP (ref 8.5–10.1)
CHLORIDE SERPL-SCNC: 103 MMOL/L — SIGNIFICANT CHANGE UP (ref 98–110)
CO2 SERPL-SCNC: 24 MMOL/L — SIGNIFICANT CHANGE UP (ref 17–32)
CREAT SERPL-MCNC: 1.1 MG/DL — SIGNIFICANT CHANGE UP (ref 0.7–1.5)
EOSINOPHIL # BLD AUTO: 0.14 K/UL — SIGNIFICANT CHANGE UP (ref 0–0.7)
EOSINOPHIL NFR BLD AUTO: 2.4 % — SIGNIFICANT CHANGE UP (ref 0–8)
GLUCOSE BLDC GLUCOMTR-MCNC: 100 MG/DL — HIGH (ref 70–99)
GLUCOSE BLDC GLUCOMTR-MCNC: 106 MG/DL — HIGH (ref 70–99)
GLUCOSE BLDC GLUCOMTR-MCNC: 113 MG/DL — HIGH (ref 70–99)
GLUCOSE SERPL-MCNC: 115 MG/DL — HIGH (ref 70–99)
HCT VFR BLD CALC: 31.5 % — LOW (ref 42–52)
HGB BLD-MCNC: 10.3 G/DL — LOW (ref 14–18)
IMM GRANULOCYTES NFR BLD AUTO: 0.5 % — HIGH (ref 0.1–0.3)
LYMPHOCYTES # BLD AUTO: 1.2 K/UL — SIGNIFICANT CHANGE UP (ref 1.2–3.4)
LYMPHOCYTES # BLD AUTO: 21 % — SIGNIFICANT CHANGE UP (ref 20.5–51.1)
MAGNESIUM SERPL-MCNC: 1.9 MG/DL — SIGNIFICANT CHANGE UP (ref 1.8–2.4)
MCHC RBC-ENTMCNC: 29.9 PG — SIGNIFICANT CHANGE UP (ref 27–31)
MCHC RBC-ENTMCNC: 32.7 G/DL — SIGNIFICANT CHANGE UP (ref 32–37)
MCV RBC AUTO: 91.3 FL — SIGNIFICANT CHANGE UP (ref 80–94)
MONOCYTES # BLD AUTO: 0.55 K/UL — SIGNIFICANT CHANGE UP (ref 0.1–0.6)
MONOCYTES NFR BLD AUTO: 9.6 % — HIGH (ref 1.7–9.3)
NEUTROPHILS # BLD AUTO: 3.77 K/UL — SIGNIFICANT CHANGE UP (ref 1.4–6.5)
NEUTROPHILS NFR BLD AUTO: 66 % — SIGNIFICANT CHANGE UP (ref 42.2–75.2)
NRBC # BLD: 0 /100 WBCS — SIGNIFICANT CHANGE UP (ref 0–0)
PLATELET # BLD AUTO: 138 K/UL — SIGNIFICANT CHANGE UP (ref 130–400)
POTASSIUM SERPL-MCNC: 4.2 MMOL/L — SIGNIFICANT CHANGE UP (ref 3.5–5)
POTASSIUM SERPL-SCNC: 4.2 MMOL/L — SIGNIFICANT CHANGE UP (ref 3.5–5)
PROT SERPL-MCNC: 5.9 G/DL — LOW (ref 6–8)
RBC # BLD: 3.45 M/UL — LOW (ref 4.7–6.1)
RBC # FLD: 13 % — SIGNIFICANT CHANGE UP (ref 11.5–14.5)
SODIUM SERPL-SCNC: 138 MMOL/L — SIGNIFICANT CHANGE UP (ref 135–146)
WBC # BLD: 5.72 K/UL — SIGNIFICANT CHANGE UP (ref 4.8–10.8)
WBC # FLD AUTO: 5.72 K/UL — SIGNIFICANT CHANGE UP (ref 4.8–10.8)

## 2021-08-28 PROCEDURE — 99221 1ST HOSP IP/OBS SF/LOW 40: CPT

## 2021-08-28 PROCEDURE — 99233 SBSQ HOSP IP/OBS HIGH 50: CPT

## 2021-08-28 RX ORDER — APIXABAN 2.5 MG/1
5 TABLET, FILM COATED ORAL
Refills: 0 | Status: DISCONTINUED | OUTPATIENT
Start: 2021-08-28 | End: 2021-08-30

## 2021-08-28 RX ADMIN — APIXABAN 5 MILLIGRAM(S): 2.5 TABLET, FILM COATED ORAL at 22:05

## 2021-08-28 RX ADMIN — CHLORHEXIDINE GLUCONATE 1 APPLICATION(S): 213 SOLUTION TOPICAL at 22:30

## 2021-08-28 RX ADMIN — Medication 50 MILLIGRAM(S): at 06:30

## 2021-08-28 RX ADMIN — ATORVASTATIN CALCIUM 40 MILLIGRAM(S): 80 TABLET, FILM COATED ORAL at 22:05

## 2021-08-28 RX ADMIN — Medication 2 CAPSULE(S): at 11:10

## 2021-08-28 RX ADMIN — POLYETHYLENE GLYCOL 3350 17 GRAM(S): 17 POWDER, FOR SOLUTION ORAL at 17:13

## 2021-08-28 RX ADMIN — CHLORHEXIDINE GLUCONATE 1 APPLICATION(S): 213 SOLUTION TOPICAL at 06:29

## 2021-08-28 RX ADMIN — Medication 2 CAPSULE(S): at 07:58

## 2021-08-28 RX ADMIN — TAMSULOSIN HYDROCHLORIDE 0.4 MILLIGRAM(S): 0.4 CAPSULE ORAL at 22:05

## 2021-08-28 RX ADMIN — Medication 1 MILLIGRAM(S): at 11:10

## 2021-08-28 RX ADMIN — CARBIDOPA AND LEVODOPA 1 TABLET(S): 25; 100 TABLET ORAL at 06:30

## 2021-08-28 RX ADMIN — CARBIDOPA AND LEVODOPA 1 TABLET(S): 25; 100 TABLET ORAL at 13:36

## 2021-08-28 RX ADMIN — APIXABAN 5 MILLIGRAM(S): 2.5 TABLET, FILM COATED ORAL at 13:36

## 2021-08-28 RX ADMIN — Medication 81 MILLIGRAM(S): at 11:10

## 2021-08-28 RX ADMIN — FINASTERIDE 5 MILLIGRAM(S): 5 TABLET, FILM COATED ORAL at 11:10

## 2021-08-28 RX ADMIN — Medication 325 MILLIGRAM(S): at 22:05

## 2021-08-28 RX ADMIN — ENOXAPARIN SODIUM 70 MILLIGRAM(S): 100 INJECTION SUBCUTANEOUS at 06:30

## 2021-08-28 RX ADMIN — Medication 2 CAPSULE(S): at 17:13

## 2021-08-28 RX ADMIN — Medication 325 MILLIGRAM(S): at 13:36

## 2021-08-28 RX ADMIN — PANTOPRAZOLE SODIUM 40 MILLIGRAM(S): 20 TABLET, DELAYED RELEASE ORAL at 06:30

## 2021-08-28 RX ADMIN — Medication 325 MILLIGRAM(S): at 06:30

## 2021-08-28 RX ADMIN — Medication 10 MILLIGRAM(S): at 22:05

## 2021-08-28 RX ADMIN — POLYETHYLENE GLYCOL 3350 17 GRAM(S): 17 POWDER, FOR SOLUTION ORAL at 06:31

## 2021-08-28 RX ADMIN — CARBIDOPA AND LEVODOPA 1 TABLET(S): 25; 100 TABLET ORAL at 22:05

## 2021-08-28 RX ADMIN — GABAPENTIN 300 MILLIGRAM(S): 400 CAPSULE ORAL at 11:10

## 2021-08-28 NOTE — PROGRESS NOTE ADULT - ATTENDING COMMENTS
Orthopedic Attending    Patient seen and examined with resident and/or PA.  All new laboratory work-up and consults reviewed.  All new radiographs were reviewed.   Agree with findings, assessment and plan.  Family has decided on non-operative management.  WBAT and will follow with serial radiographs.

## 2021-08-28 NOTE — PROGRESS NOTE ADULT - ASSESSMENT
4 yo M PMHx of HTN, DM II, CAD s/p CABG, CVA, and Parkinson's disease was brought to ED for right hip after unwitnessed fall which occurred two days before presentation. Was found by his daughter on the floor - was awake and alert when he was found about 30-60 mins after the fall. The patient fell onto his right side and he has been unable to bear weight since due to rt hip pain. Family and pt isn't sure if mechanical or syncopal    Unwitnessed fall, presumably syncope  Resulting in right hip fracture, likely due to osteopenia  Echo shows septal dysergy, multiple valve dysfuction. Case d/w granddaughter who aids in decision making who agrees no cardiac intervention. Will d/c telemetry  EKG showed atrial fibrillation, LBBB, old  s/p MRI of the hip, discussed with orthopedics team and the family, they decided on non operative management at this time  WBAT, will ask for PT eval     Chronic Atrial Fibrillation/Flutter with RVR  [id number]  Continue Metoprolol 50 mg daily and eliquis 5mg Q12H     CAD s/p CABG: Continue ASA, Metoprolol and Lipitor.   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  Eliquis started on this admission    DM II   - FS controlled    CKD III  - stable renal function.    Normocytic anemia & thrombocytopenia  Iron and folate deficiency  Continue ferrous sulfate and folic acid.     Parkinson's disease  - on Sinemet.     BPH  - on Tamsulosin and Finasteride.     #Progress Note Handoff:  Pending (specify): PT eval, pain control and SNF likely   Family discussion: plan of care discussed with patient and granddaughter Flor  Disposition: family requesting STR (patient unvaccinated, family refusing vaccine at this time )

## 2021-08-28 NOTE — CHART NOTE - NSCHARTNOTEFT_GEN_A_CORE
Cardiology cleared patient, however Orthopedics had conversation w family and they decided for medical management. Eloquis 5mg BID restarted due to CHADSVASC score of 6

## 2021-08-28 NOTE — PROGRESS NOTE ADULT - SUBJECTIVE AND OBJECTIVE BOX
Orthopaedic Surgery Progress Note    ALEXY PURDY  [id number]    89M with right nondisplaced GT fracture with IT extension found on MRI. S&E at bedside this AM. Pain well controlled, no new complaints today.    Spoke to patients grand-daughter Flor at length ([phone]). Surgical and non-surgical options for right GT fracture with IT extension discussed at length. R/b/c/a of both options were discussed. Family meeting was had and the family would like to pursue non-operative treatment. At this time, the patient can be made WBAT. Family is aware of the possibility of fracture displacement and the need for surgery in the future. Discussed with the patient today with the help of a  and he is on board with the plan.      acetaminophen   Tablet .. 650 milliGRAM(s) Oral every 6 hours PRN  aspirin enteric coated 81 milliGRAM(s) Oral daily  atorvastatin 40 milliGRAM(s) Oral at bedtime  bisacodyl 10 milliGRAM(s) Oral at bedtime  carbidopa/levodopa  25/100 1 Tablet(s) Oral three times a day  chlorhexidine 4% Liquid 1 Application(s) Topical <User Schedule>  enoxaparin Injectable 70 milliGRAM(s) SubCutaneous two times a day  ferrous    sulfate 325 milliGRAM(s) Oral three times a day  finasteride 5 milliGRAM(s) Oral daily  folic acid 1 milliGRAM(s) Oral daily  gabapentin 300 milliGRAM(s) Oral daily  melatonin 3 milliGRAM(s) Oral at bedtime PRN  metoprolol succinate ER 50 milliGRAM(s) Oral daily  morphine  - Injectable 2 milliGRAM(s) IV Push every 4 hours PRN  ondansetron Injectable 4 milliGRAM(s) IV Push every 8 hours PRN  pancrelipase  (CREON 12,000 Lipase Units) 2 Capsule(s) Oral three times a day with meals  pantoprazole    Tablet 40 milliGRAM(s) Oral before breakfast  polyethylene glycol 3350 17 Gram(s) Oral two times a day  tamsulosin 0.4 milliGRAM(s) Oral at bedtime      T(C): 35.9 (08-28-21 @ 05:05), Max: 36.3 (08-27-21 @ 14:34)  HR: 85 (08-28-21 @ 05:05) (71 - 85)  BP: 115/67 (08-28-21 @ 05:05) (98/58 - 119/58)  RR: 18 (08-27-21 @ 21:10) (18 - 18)  SpO2: --    P/E:  NAD  Resting comfortably    RLE  Pain with ROM of the hip  SILT sp/dp/t/sural/saph  Firing ta/ehl/fhl/gs  Foot WWP, 2+ DP pulse    Labs                        10.3   5.72  )-----------( 138      ( 28 Aug 2021 04:30 )             31.5     08-28    138  |  103  |  20  ----------------------------<  115<H>  4.2   |  24  |  1.1    Ca    8.9      28 Aug 2021 04:30  Mg     1.9     08-28    TPro  5.9<L>  /  Alb  3.4<L>  /  TBili  0.5  /  DBili  x   /  AST  12  /  ALT  <5  /  AlkPhos  94  08-28    LIVER FUNCTIONS - ( 28 Aug 2021 04:30 )  Alb: 3.4 g/dL / Pro: 5.9 g/dL / ALK PHOS: 94 U/L / ALT: <5 U/L / AST: 12 U/L / GGT: x             A/P:   89yo Male with GT fracture with IT extension (nondisplaced). R/b/c/a of surgical and non-op options discussed at length. Family would like to trial non-op treatment. Patient is in agreement. They understand the possibility of fracture displacement in the future and the need for surgery in the future.    Plan for non-op treatment of fracture  Weightbearing: WBAT  DVT ppx: SCD, LVX  Pain control  Home medications  Trend labs  PT/OT/Rehab  Dispo: Pending PT recommendations

## 2021-08-28 NOTE — PROGRESS NOTE ADULT - SUBJECTIVE AND OBJECTIVE BOX
ALEXY PURDY  89y Male    CHIEF COMPLAINT:    Patient is a 89y old  Male who presents with a chief complaint of fall (28 Aug 2021 10:00)      INTERVAL HPI/OVERNIGHT EVENTS:    Patient seen and examined. No acute events overnight. Complains of RLE knee pain and b/l Heel pains     ROS: All other systems are negative.    Vital Signs:    T(F): 97.6 (21 @ 15:00), Max: 97.6 (21 @ 15:00)  HR: 80 (21 @ 15:00) (78 - 85)  BP: 110/54 (21 @ 15:00) (98/58 - 115/67)  RR: 19 (21 @ 15:00) (18 - 19)  Daily Weight in k (27 Aug 2021 16:43)    POCT Blood Glucose.: 113 mg/dL (28 Aug 2021 07:53)  POCT Blood Glucose.: 140 mg/dL (27 Aug 2021 16:27)    PHYSICAL EXAM:    GENERAL:  NAD  SKIN: No rashes or lesions  HEENT: Atraumatic. Normocephalic.    NECK: Supple, No JVD.    PULMONARY: CTA B/L. No wheezing.    CVS: Normal S1, S2. Rate and Rhythm are regular   ABDOMEN/GI: Soft, Nontender, Nondistended; BS present  MSK:  No edema B/L LE. No clubbing or cyanosis   NEUROLOGIC: moves all extremities, + b/l UE resting tremor   PSYCH: Alert & oriented x 3,     Consultant(s) Notes Reviewed:  [x ] YES  [ ] NO  Care Discussed with Consultants/Other Providers [ x] YES  [ ] NO    LABS:                        10.3   5.72  )-----------( 138      ( 28 Aug 2021 04:30 )             31.5     138  |  103  |  20  ----------------------------<  115<H>  4.2   |  24  |  1.1    Ca    8.9      28 Aug 2021 04:30  Mg     1.9         TPro  5.9<L>  /  Alb  3.4<L>  /  TBili  0.5  /  DBili  x   /  AST  12  /  ALT  <5  /  AlkPhos  94      RADIOLOGY & ADDITIONAL TESTS:  Imaging or report Personally Reviewed:  [x] YES  [ ] NO  EKG reviewed: [x] YES  [ ] NO    Medications:  Standing  apixaban 5 milliGRAM(s) Oral two times a day  aspirin enteric coated 81 milliGRAM(s) Oral daily  atorvastatin 40 milliGRAM(s) Oral at bedtime  bisacodyl 10 milliGRAM(s) Oral at bedtime  carbidopa/levodopa  25/100 1 Tablet(s) Oral three times a day  chlorhexidine 4% Liquid 1 Application(s) Topical <User Schedule>  ferrous    sulfate 325 milliGRAM(s) Oral three times a day  finasteride 5 milliGRAM(s) Oral daily  folic acid 1 milliGRAM(s) Oral daily  gabapentin 300 milliGRAM(s) Oral daily  metoprolol succinate ER 50 milliGRAM(s) Oral daily  pancrelipase  (CREON 12,000 Lipase Units) 2 Capsule(s) Oral three times a day with meals  pantoprazole    Tablet 40 milliGRAM(s) Oral before breakfast  polyethylene glycol 3350 17 Gram(s) Oral two times a day  tamsulosin 0.4 milliGRAM(s) Oral at bedtime    PRN Meds  acetaminophen   Tablet .. 650 milliGRAM(s) Oral every 6 hours PRN  melatonin 3 milliGRAM(s) Oral at bedtime PRN  morphine  - Injectable 2 milliGRAM(s) IV Push every 4 hours PRN  ondansetron Injectable 4 milliGRAM(s) IV Push every 8 hours PRN

## 2021-08-29 LAB
ALBUMIN SERPL ELPH-MCNC: 3.2 G/DL — LOW (ref 3.5–5.2)
ALP SERPL-CCNC: 109 U/L — SIGNIFICANT CHANGE UP (ref 30–115)
ALT FLD-CCNC: <5 U/L — SIGNIFICANT CHANGE UP (ref 0–41)
ANION GAP SERPL CALC-SCNC: 10 MMOL/L — SIGNIFICANT CHANGE UP (ref 7–14)
AST SERPL-CCNC: 13 U/L — SIGNIFICANT CHANGE UP (ref 0–41)
BASOPHILS # BLD AUTO: 0.04 K/UL — SIGNIFICANT CHANGE UP (ref 0–0.2)
BASOPHILS NFR BLD AUTO: 0.7 % — SIGNIFICANT CHANGE UP (ref 0–1)
BILIRUB SERPL-MCNC: 0.4 MG/DL — SIGNIFICANT CHANGE UP (ref 0.2–1.2)
BUN SERPL-MCNC: 24 MG/DL — HIGH (ref 10–20)
CALCIUM SERPL-MCNC: 9 MG/DL — SIGNIFICANT CHANGE UP (ref 8.5–10.1)
CHLORIDE SERPL-SCNC: 102 MMOL/L — SIGNIFICANT CHANGE UP (ref 98–110)
CO2 SERPL-SCNC: 26 MMOL/L — SIGNIFICANT CHANGE UP (ref 17–32)
CREAT SERPL-MCNC: 1 MG/DL — SIGNIFICANT CHANGE UP (ref 0.7–1.5)
EOSINOPHIL # BLD AUTO: 0.17 K/UL — SIGNIFICANT CHANGE UP (ref 0–0.7)
EOSINOPHIL NFR BLD AUTO: 2.8 % — SIGNIFICANT CHANGE UP (ref 0–8)
GLUCOSE BLDC GLUCOMTR-MCNC: 105 MG/DL — HIGH (ref 70–99)
GLUCOSE SERPL-MCNC: 101 MG/DL — HIGH (ref 70–99)
HCT VFR BLD CALC: 31.9 % — LOW (ref 42–52)
HGB BLD-MCNC: 10.4 G/DL — LOW (ref 14–18)
IMM GRANULOCYTES NFR BLD AUTO: 0.5 % — HIGH (ref 0.1–0.3)
LYMPHOCYTES # BLD AUTO: 1.45 K/UL — SIGNIFICANT CHANGE UP (ref 1.2–3.4)
LYMPHOCYTES # BLD AUTO: 23.8 % — SIGNIFICANT CHANGE UP (ref 20.5–51.1)
MAGNESIUM SERPL-MCNC: 2 MG/DL — SIGNIFICANT CHANGE UP (ref 1.8–2.4)
MCHC RBC-ENTMCNC: 30.1 PG — SIGNIFICANT CHANGE UP (ref 27–31)
MCHC RBC-ENTMCNC: 32.6 G/DL — SIGNIFICANT CHANGE UP (ref 32–37)
MCV RBC AUTO: 92.2 FL — SIGNIFICANT CHANGE UP (ref 80–94)
MONOCYTES # BLD AUTO: 0.51 K/UL — SIGNIFICANT CHANGE UP (ref 0.1–0.6)
MONOCYTES NFR BLD AUTO: 8.4 % — SIGNIFICANT CHANGE UP (ref 1.7–9.3)
NEUTROPHILS # BLD AUTO: 3.89 K/UL — SIGNIFICANT CHANGE UP (ref 1.4–6.5)
NEUTROPHILS NFR BLD AUTO: 63.8 % — SIGNIFICANT CHANGE UP (ref 42.2–75.2)
NRBC # BLD: 0 /100 WBCS — SIGNIFICANT CHANGE UP (ref 0–0)
PLATELET # BLD AUTO: 147 K/UL — SIGNIFICANT CHANGE UP (ref 130–400)
POTASSIUM SERPL-MCNC: 4.7 MMOL/L — SIGNIFICANT CHANGE UP (ref 3.5–5)
POTASSIUM SERPL-SCNC: 4.7 MMOL/L — SIGNIFICANT CHANGE UP (ref 3.5–5)
PROT SERPL-MCNC: 5.8 G/DL — LOW (ref 6–8)
RBC # BLD: 3.46 M/UL — LOW (ref 4.7–6.1)
RBC # FLD: 13 % — SIGNIFICANT CHANGE UP (ref 11.5–14.5)
SODIUM SERPL-SCNC: 138 MMOL/L — SIGNIFICANT CHANGE UP (ref 135–146)
WBC # BLD: 6.09 K/UL — SIGNIFICANT CHANGE UP (ref 4.8–10.8)
WBC # FLD AUTO: 6.09 K/UL — SIGNIFICANT CHANGE UP (ref 4.8–10.8)

## 2021-08-29 PROCEDURE — 99232 SBSQ HOSP IP/OBS MODERATE 35: CPT

## 2021-08-29 RX ADMIN — Medication 81 MILLIGRAM(S): at 11:08

## 2021-08-29 RX ADMIN — CARBIDOPA AND LEVODOPA 1 TABLET(S): 25; 100 TABLET ORAL at 05:04

## 2021-08-29 RX ADMIN — FINASTERIDE 5 MILLIGRAM(S): 5 TABLET, FILM COATED ORAL at 11:08

## 2021-08-29 RX ADMIN — Medication 325 MILLIGRAM(S): at 23:08

## 2021-08-29 RX ADMIN — Medication 325 MILLIGRAM(S): at 13:22

## 2021-08-29 RX ADMIN — Medication 1 MILLIGRAM(S): at 11:08

## 2021-08-29 RX ADMIN — Medication 10 MILLIGRAM(S): at 23:07

## 2021-08-29 RX ADMIN — Medication 2 CAPSULE(S): at 08:51

## 2021-08-29 RX ADMIN — POLYETHYLENE GLYCOL 3350 17 GRAM(S): 17 POWDER, FOR SOLUTION ORAL at 05:04

## 2021-08-29 RX ADMIN — Medication 2 CAPSULE(S): at 11:44

## 2021-08-29 RX ADMIN — PANTOPRAZOLE SODIUM 40 MILLIGRAM(S): 20 TABLET, DELAYED RELEASE ORAL at 05:05

## 2021-08-29 RX ADMIN — APIXABAN 5 MILLIGRAM(S): 2.5 TABLET, FILM COATED ORAL at 05:04

## 2021-08-29 RX ADMIN — Medication 325 MILLIGRAM(S): at 05:04

## 2021-08-29 RX ADMIN — APIXABAN 5 MILLIGRAM(S): 2.5 TABLET, FILM COATED ORAL at 17:06

## 2021-08-29 RX ADMIN — Medication 50 MILLIGRAM(S): at 05:04

## 2021-08-29 RX ADMIN — CARBIDOPA AND LEVODOPA 1 TABLET(S): 25; 100 TABLET ORAL at 23:08

## 2021-08-29 RX ADMIN — CARBIDOPA AND LEVODOPA 1 TABLET(S): 25; 100 TABLET ORAL at 13:22

## 2021-08-29 RX ADMIN — ATORVASTATIN CALCIUM 40 MILLIGRAM(S): 80 TABLET, FILM COATED ORAL at 23:08

## 2021-08-29 RX ADMIN — GABAPENTIN 300 MILLIGRAM(S): 400 CAPSULE ORAL at 11:08

## 2021-08-29 RX ADMIN — TAMSULOSIN HYDROCHLORIDE 0.4 MILLIGRAM(S): 0.4 CAPSULE ORAL at 23:08

## 2021-08-29 RX ADMIN — POLYETHYLENE GLYCOL 3350 17 GRAM(S): 17 POWDER, FOR SOLUTION ORAL at 17:06

## 2021-08-29 RX ADMIN — Medication 2 CAPSULE(S): at 17:06

## 2021-08-29 NOTE — PROGRESS NOTE ADULT - SUBJECTIVE AND OBJECTIVE BOX
ALEXY PURDY  89y Male    CHIEF COMPLAINT:    Patient is a 89y old  Male who presents with a chief complaint of fall (28 Aug 2021 15:22)      INTERVAL HPI/OVERNIGHT EVENTS:    Patient seen and examined. No acute events overnight. No active complaints     ROS: All other systems are negative.    Vital Signs:    T(F): 96.2 (08-29-21 @ 05:00), Max: 97.6 (08-28-21 @ 15:00)  HR: 83 (08-29-21 @ 05:00) (80 - 84)  BP: 188/65 (08-29-21 @ 05:00) (110/54 - 188/65)  RR: 18 (08-29-21 @ 05:00) (18 - 19)    POCT Blood Glucose.: 105 mg/dL (29 Aug 2021 07:58)  POCT Blood Glucose.: 106 mg/dL (28 Aug 2021 20:47)  POCT Blood Glucose.: 100 mg/dL (28 Aug 2021 16:50)    PHYSICAL EXAM:    GENERAL:  NAD  SKIN: No rashes or lesions  HEENT: Atraumatic. Normocephalic.   NECK: Supple, No JVD.    PULMONARY: CTA B/L. No wheezing. No rales  CVS: Normal S1, S2. Rate and Rhythm are regular.   ABDOMEN/GI: Soft, Nontender, Nondistended   MSK:  No clubbing or cyanosis   NEUROLOGIC:  moves all extremities   PSYCH: Alert & oriented x 3, normal affect    Consultant(s) Notes Reviewed:  [x ] YES  [ ] NO  Care Discussed with Consultants/Other Providers [ x] YES  [ ] NO    LABS:                        10.4   6.09  )-----------( 147      ( 29 Aug 2021 06:37 )             31.9     138  |  102  |  24<H>  ----------------------------<  101<H>  4.7   |  26  |  1.0    Ca    9.0      29 Aug 2021 06:37  Mg     2.0     08-29    TPro  5.8<L>  /  Alb  3.2<L>  /  TBili  0.4  /  DBili  x   /  AST  13  /  ALT  <5  /  AlkPhos  109  08-29    RADIOLOGY & ADDITIONAL TESTS:  Imaging or report Personally Reviewed:  [x] YES  [ ] NO  EKG reviewed: [x] YES  [ ] NO    Medications:  Standing  apixaban 5 milliGRAM(s) Oral two times a day  aspirin enteric coated 81 milliGRAM(s) Oral daily  atorvastatin 40 milliGRAM(s) Oral at bedtime  bisacodyl 10 milliGRAM(s) Oral at bedtime  carbidopa/levodopa  25/100 1 Tablet(s) Oral three times a day  chlorhexidine 4% Liquid 1 Application(s) Topical <User Schedule>  ferrous    sulfate 325 milliGRAM(s) Oral three times a day  finasteride 5 milliGRAM(s) Oral daily  folic acid 1 milliGRAM(s) Oral daily  gabapentin 300 milliGRAM(s) Oral daily  metoprolol succinate ER 50 milliGRAM(s) Oral daily  pancrelipase  (CREON 12,000 Lipase Units) 2 Capsule(s) Oral three times a day with meals  pantoprazole    Tablet 40 milliGRAM(s) Oral before breakfast  polyethylene glycol 3350 17 Gram(s) Oral two times a day  tamsulosin 0.4 milliGRAM(s) Oral at bedtime    PRN Meds  acetaminophen   Tablet .. 650 milliGRAM(s) Oral every 6 hours PRN  melatonin 3 milliGRAM(s) Oral at bedtime PRN  ondansetron Injectable 4 milliGRAM(s) IV Push every 8 hours PRN

## 2021-08-29 NOTE — PROGRESS NOTE ADULT - ASSESSMENT
4 yo M PMHx of HTN, DM II, CAD s/p CABG, CVA, and Parkinson's disease was brought to ED for right hip after unwitnessed fall which occurred two days before presentation. Was found by his daughter on the floor - was awake and alert when he was found about 30-60 mins after the fall. The patient fell onto his right side and he has been unable to bear weight since due to rt hip pain. Family and pt isn't sure if mechanical or syncopal    Unwitnessed fall, presumably syncope  Resulting in right hip fracture, likely due to osteopenia  Echo shows septal dysergy, multiple valve dysfuction. Case d/w granddaughter who aids in decision making who agrees no cardiac intervention  EKG showed atrial fibrillation, LBBB, old  s/p MRI of the hip, discussed with orthopedics team and the family, they decided on non operative management at this time  WBAT, PT eval     Chronic Atrial Fibrillation/Flutter with RVR  [id number]  Continue Metoprolol 50 mg daily and eliquis 5mg Q12H     CAD s/p CABG: Continue ASA, Metoprolol and Lipitor.   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  Eliquis started on this admission    DM II   - FS controlled    CKD III  - stable renal function.    Normocytic anemia & thrombocytopenia  Iron and folate deficiency  Continue ferrous sulfate and folic acid.     Parkinson's disease  - on Sinemet.     BPH  - on Tamsulosin and Finasteride.     #Progress Note Handoff:  Pending (specify): PT eval, pain control and SNF likely   Family discussion: plan of care discussed with patient and granddaughter Flor  Disposition: family requesting STR (patient unvaccinated, family refusing vaccine at this time )

## 2021-08-30 ENCOUNTER — TRANSCRIPTION ENCOUNTER (OUTPATIENT)
Age: 86
End: 2021-08-30

## 2021-08-30 VITALS
DIASTOLIC BLOOD PRESSURE: 65 MMHG | RESPIRATION RATE: 19 BRPM | SYSTOLIC BLOOD PRESSURE: 139 MMHG | HEART RATE: 65 BPM | TEMPERATURE: 99 F

## 2021-08-30 LAB
ALBUMIN SERPL ELPH-MCNC: 3.6 G/DL — SIGNIFICANT CHANGE UP (ref 3.5–5.2)
ALP SERPL-CCNC: 116 U/L — HIGH (ref 30–115)
ALT FLD-CCNC: 7 U/L — SIGNIFICANT CHANGE UP (ref 0–41)
ANION GAP SERPL CALC-SCNC: 8 MMOL/L — SIGNIFICANT CHANGE UP (ref 7–14)
AST SERPL-CCNC: 14 U/L — SIGNIFICANT CHANGE UP (ref 0–41)
BASOPHILS # BLD AUTO: 0.03 K/UL — SIGNIFICANT CHANGE UP (ref 0–0.2)
BASOPHILS NFR BLD AUTO: 0.4 % — SIGNIFICANT CHANGE UP (ref 0–1)
BILIRUB SERPL-MCNC: 0.4 MG/DL — SIGNIFICANT CHANGE UP (ref 0.2–1.2)
BUN SERPL-MCNC: 24 MG/DL — HIGH (ref 10–20)
CALCIUM SERPL-MCNC: 9.2 MG/DL — SIGNIFICANT CHANGE UP (ref 8.5–10.1)
CHLORIDE SERPL-SCNC: 103 MMOL/L — SIGNIFICANT CHANGE UP (ref 98–110)
CO2 SERPL-SCNC: 27 MMOL/L — SIGNIFICANT CHANGE UP (ref 17–32)
CREAT SERPL-MCNC: 1.1 MG/DL — SIGNIFICANT CHANGE UP (ref 0.7–1.5)
EOSINOPHIL # BLD AUTO: 0.16 K/UL — SIGNIFICANT CHANGE UP (ref 0–0.7)
EOSINOPHIL NFR BLD AUTO: 2.1 % — SIGNIFICANT CHANGE UP (ref 0–8)
GLUCOSE BLDC GLUCOMTR-MCNC: 108 MG/DL — HIGH (ref 70–99)
GLUCOSE BLDC GLUCOMTR-MCNC: 136 MG/DL — HIGH (ref 70–99)
GLUCOSE BLDC GLUCOMTR-MCNC: 155 MG/DL — HIGH (ref 70–99)
GLUCOSE SERPL-MCNC: 124 MG/DL — HIGH (ref 70–99)
HCT VFR BLD CALC: 33.5 % — LOW (ref 42–52)
HGB BLD-MCNC: 11.1 G/DL — LOW (ref 14–18)
IMM GRANULOCYTES NFR BLD AUTO: 0.4 % — HIGH (ref 0.1–0.3)
LYMPHOCYTES # BLD AUTO: 2.32 K/UL — SIGNIFICANT CHANGE UP (ref 1.2–3.4)
LYMPHOCYTES # BLD AUTO: 30.2 % — SIGNIFICANT CHANGE UP (ref 20.5–51.1)
MAGNESIUM SERPL-MCNC: 2 MG/DL — SIGNIFICANT CHANGE UP (ref 1.8–2.4)
MCHC RBC-ENTMCNC: 30.6 PG — SIGNIFICANT CHANGE UP (ref 27–31)
MCHC RBC-ENTMCNC: 33.1 G/DL — SIGNIFICANT CHANGE UP (ref 32–37)
MCV RBC AUTO: 92.3 FL — SIGNIFICANT CHANGE UP (ref 80–94)
MONOCYTES # BLD AUTO: 0.54 K/UL — SIGNIFICANT CHANGE UP (ref 0.1–0.6)
MONOCYTES NFR BLD AUTO: 7 % — SIGNIFICANT CHANGE UP (ref 1.7–9.3)
NEUTROPHILS # BLD AUTO: 4.61 K/UL — SIGNIFICANT CHANGE UP (ref 1.4–6.5)
NEUTROPHILS NFR BLD AUTO: 59.9 % — SIGNIFICANT CHANGE UP (ref 42.2–75.2)
NRBC # BLD: 0 /100 WBCS — SIGNIFICANT CHANGE UP (ref 0–0)
PLATELET # BLD AUTO: 188 K/UL — SIGNIFICANT CHANGE UP (ref 130–400)
POTASSIUM SERPL-MCNC: 4.3 MMOL/L — SIGNIFICANT CHANGE UP (ref 3.5–5)
POTASSIUM SERPL-SCNC: 4.3 MMOL/L — SIGNIFICANT CHANGE UP (ref 3.5–5)
PROT SERPL-MCNC: 6.5 G/DL — SIGNIFICANT CHANGE UP (ref 6–8)
RBC # BLD: 3.63 M/UL — LOW (ref 4.7–6.1)
RBC # FLD: 12.8 % — SIGNIFICANT CHANGE UP (ref 11.5–14.5)
SODIUM SERPL-SCNC: 138 MMOL/L — SIGNIFICANT CHANGE UP (ref 135–146)
WBC # BLD: 7.69 K/UL — SIGNIFICANT CHANGE UP (ref 4.8–10.8)
WBC # FLD AUTO: 7.69 K/UL — SIGNIFICANT CHANGE UP (ref 4.8–10.8)

## 2021-08-30 PROCEDURE — 99233 SBSQ HOSP IP/OBS HIGH 50: CPT

## 2021-08-30 RX ORDER — APIXABAN 2.5 MG/1
1 TABLET, FILM COATED ORAL
Qty: 0 | Refills: 0 | DISCHARGE
Start: 2021-08-30

## 2021-08-30 RX ORDER — ACETAMINOPHEN 500 MG
2 TABLET ORAL
Qty: 0 | Refills: 0 | DISCHARGE
Start: 2021-08-30

## 2021-08-30 RX ORDER — SITAGLIPTIN AND METFORMIN HYDROCHLORIDE 500; 50 MG/1; MG/1
0.5 TABLET, FILM COATED ORAL
Qty: 0 | Refills: 0 | DISCHARGE

## 2021-08-30 RX ORDER — POLYETHYLENE GLYCOL 3350 17 G/17G
17 POWDER, FOR SOLUTION ORAL
Qty: 0 | Refills: 0 | DISCHARGE
Start: 2021-08-30

## 2021-08-30 RX ADMIN — PANTOPRAZOLE SODIUM 40 MILLIGRAM(S): 20 TABLET, DELAYED RELEASE ORAL at 05:54

## 2021-08-30 RX ADMIN — Medication 2 CAPSULE(S): at 11:30

## 2021-08-30 RX ADMIN — Medication 1 MILLIGRAM(S): at 11:09

## 2021-08-30 RX ADMIN — GABAPENTIN 300 MILLIGRAM(S): 400 CAPSULE ORAL at 11:09

## 2021-08-30 RX ADMIN — Medication 50 MILLIGRAM(S): at 05:54

## 2021-08-30 RX ADMIN — Medication 325 MILLIGRAM(S): at 13:39

## 2021-08-30 RX ADMIN — CARBIDOPA AND LEVODOPA 1 TABLET(S): 25; 100 TABLET ORAL at 13:39

## 2021-08-30 RX ADMIN — Medication 2 CAPSULE(S): at 17:00

## 2021-08-30 RX ADMIN — FINASTERIDE 5 MILLIGRAM(S): 5 TABLET, FILM COATED ORAL at 11:09

## 2021-08-30 RX ADMIN — Medication 2 CAPSULE(S): at 08:09

## 2021-08-30 RX ADMIN — APIXABAN 5 MILLIGRAM(S): 2.5 TABLET, FILM COATED ORAL at 05:54

## 2021-08-30 RX ADMIN — Medication 325 MILLIGRAM(S): at 05:54

## 2021-08-30 RX ADMIN — CARBIDOPA AND LEVODOPA 1 TABLET(S): 25; 100 TABLET ORAL at 05:54

## 2021-08-30 RX ADMIN — APIXABAN 5 MILLIGRAM(S): 2.5 TABLET, FILM COATED ORAL at 17:00

## 2021-08-30 RX ADMIN — POLYETHYLENE GLYCOL 3350 17 GRAM(S): 17 POWDER, FOR SOLUTION ORAL at 05:54

## 2021-08-30 RX ADMIN — Medication 81 MILLIGRAM(S): at 11:09

## 2021-08-30 NOTE — DISCHARGE NOTE PROVIDER - CARE PROVIDER_API CALL
Karl Hanna)  Orthopaedic Surgery  [address] [place] [address]  Phone: [phone]  Fax: [phone]  Follow Up Time:

## 2021-08-30 NOTE — PROGRESS NOTE ADULT - PROVIDER SPECIALTY LIST ADULT
Hospitalist
Internal Medicine
Orthopedics
Hospitalist
Internal Medicine
Orthopedics
Internal Medicine
Internal Medicine
Hospitalist
Internal Medicine

## 2021-08-30 NOTE — DISCHARGE NOTE PROVIDER - NSDCCPCAREPLAN_GEN_ALL_CORE_FT
PRINCIPAL DISCHARGE DIAGNOSIS  Diagnosis: Intertrochanteric fracture of right hip  Assessment and Plan of Treatment: Fall prevention includes ways to make your home and other areas safer. It also includes ways you can move more carefully to prevent a fall. Health conditions that cause changes in your blood pressure, vision, or muscle strength and coordination may increase your risk for falls. Medicines may also increase your risk for falls if they make you dizzy, weak, or sleepy.  Seek Medical Attention If:  You have fallen and are unconscious.  fallen and cannot move part of your body.  You have fallen and have pain or a headache.  Fall prevention tips:  Stand or sit up slowly.  Use assistive devices as directed.   You may need to have grab bars put in your bathroom near the toilet or in the shower.  Wear shoes that fit well and have soles that . Wear shoes both inside and outside. Do not wear shoes with high heels.  Wear a personal alarm that can call 911 in an emergency.   Manage your medical conditions. Keep all appointments with your healthcare providers. Visit your eye doctor as directed.  Home safety tips:   Put nonslip strips on your bath or shower floor to prevent you from   Use a shower seat so you do not need to stand while you shower. Sit on the toilet or a chair in your bathroom to dry yourself and put on clothing. This will prevent you from losing your balance from drying or dressing yourself while you are standing.  Keep paths clear. Remove books, shoes, and other objects from walkways and stairs. Place cords for telephones and lamps out of the way so that you do not need to walk over them. Tape them down if you cannot move them. Remove small rugs or secure it with double-sided tape to prevent you from   Install bright lights in your home. Use night lights to help light paths to the bathroom or kitchen. Always turn on the light before you start walking.  Keep items you use often on shelves within reach. Do not use a step stool to help you reach an item.  Place reflective tape on the edges of your stairs. To see better.

## 2021-08-30 NOTE — PROGRESS NOTE ADULT - SUBJECTIVE AND OBJECTIVE BOX
ALEXY PURDY 89y Male  MRN#: [id number]   CODE STATUS:________    Hospital Day: 9d    Pt is currently admitted with the primary diagnosis of     SUBJECTIVE  Hospital Course    Overnight events     Subjective complaints     Present Today:   - Cooper:  No [  ], Yes [   ] : Indication:     - Type of IV Access:       .. CVC/Piccline:  No [  ], Yes [   ] : Indication:       .. Midline: No [  ], Yes [   ] : Indication:                                             ----------------------------------------------------------  OBJECTIVE  PAST MEDICAL & SURGICAL HISTORY  Parkinson disease    Coronary artery disease    Chronic atrial fibrillation    Stroke, embolic    Benign prostate hyperplasia    History of iron deficiency    S/P CABG (coronary artery bypass graft)                                              -----------------------------------------------------------  ALLERGIES:  No Known Allergies                                            ------------------------------------------------------------    HOME MEDICATIONS  Home Medications:  aspirin 81 mg oral tablet: 1 tab(s) orally once a day (11 Jun 2020 15:21)  atorvastatin 40 mg oral tablet: 1 tab(s) orally once a day (21 Aug 2021 15:59)  carbidopa-levodopa 25 mg-100 mg oral tablet: 1 tab(s) orally 3 times a day (06 Jun 2020 22:09)  Creon 24,000 units oral delayed release capsule: 1 cap(s) orally 3 times a day (21 Aug 2021 16:07)  Dexilant 60 mg oral delayed release capsule: 1 cap(s) orally once a day (21 Aug 2021 16:06)  dicyclomine 10 mg oral capsule: 1 cap(s) orally once a day, As Needed (11 Jun 2020 14:56)  ferrous sulfate 325 mg (65 mg elemental iron) oral tablet: 1 tab(s) orally 3 times a day (06 Jun 2020 22:09)  finasteride 5 mg oral tablet: 1 tab(s) orally once a day (06 Jun 2020 22:09)  folic acid 1 mg oral tablet: 1 tab(s) orally once a day (06 Jun 2020 22:09)  gabapentin 300 mg oral tablet: 1 tab(s) orally once a day (21 Aug 2021 16:08)  Janumet 50 mg-500 mg oral tablet: 0.5 tab(s) orally once a day, As Needed (06 Jun 2020 22:09)  Metoprolol Succinate ER 25 mg oral tablet, extended release: 0.5 tab(s) orally once a day (21 Aug 2021 16:03)  tamsulosin 0.4 mg oral capsule: 1 cap(s) orally once a day (06 Jun 2020 22:09)  Vitamin D3 1250 mcg (50,000 intl units) oral capsule: 1 cap(s) orally once a week (21 Aug 2021 16:07)                           MEDICATIONS:  STANDING MEDICATIONS  apixaban 5 milliGRAM(s) Oral two times a day  aspirin enteric coated 81 milliGRAM(s) Oral daily  atorvastatin 40 milliGRAM(s) Oral at bedtime  bisacodyl 10 milliGRAM(s) Oral at bedtime  carbidopa/levodopa  25/100 1 Tablet(s) Oral three times a day  chlorhexidine 4% Liquid 1 Application(s) Topical <User Schedule>  ferrous    sulfate 325 milliGRAM(s) Oral three times a day  finasteride 5 milliGRAM(s) Oral daily  folic acid 1 milliGRAM(s) Oral daily  gabapentin 300 milliGRAM(s) Oral daily  metoprolol succinate ER 50 milliGRAM(s) Oral daily  pancrelipase  (CREON 12,000 Lipase Units) 2 Capsule(s) Oral three times a day with meals  pantoprazole    Tablet 40 milliGRAM(s) Oral before breakfast  polyethylene glycol 3350 17 Gram(s) Oral two times a day  tamsulosin 0.4 milliGRAM(s) Oral at bedtime    PRN MEDICATIONS  acetaminophen   Tablet .. 650 milliGRAM(s) Oral every 6 hours PRN  melatonin 3 milliGRAM(s) Oral at bedtime PRN  ondansetron Injectable 4 milliGRAM(s) IV Push every 8 hours PRN                                            ------------------------------------------------------------  VITAL SIGNS: Last 24 Hours  T(C): 36 (30 Aug 2021 05:21), Max: 36.2 (29 Aug 2021 14:32)  T(F): 96.8 (30 Aug 2021 05:21), Max: 97.1 (29 Aug 2021 14:32)  HR: 72 (30 Aug 2021 05:21) (72 - 74)  BP: 137/72 (30 Aug 2021 05:21) (125/68 - 137/72)  BP(mean): --  RR: 19 (30 Aug 2021 05:21) (19 - 19)  SpO2: --                                             --------------------------------------------------------------  LABS:                        10.4   6.09  )-----------( 147      ( 29 Aug 2021 06:37 )             31.9     08-29    138  |  102  |  24<H>  ----------------------------<  101<H>  4.7   |  26  |  1.0    Ca    9.0      29 Aug 2021 06:37  Mg     2.0     08-29    TPro  5.8<L>  /  Alb  3.2<L>  /  TBili  0.4  /  DBili  x   /  AST  13  /  ALT  <5  /  AlkPhos  109  08-29                                                              -------------------------------------------------------------  RADIOLOGY:                                            --------------------------------------------------------------    PHYSICAL EXAM:  General:   HEENT: NCAT  LUNGS: CTAB, Good air entry bilat  HEART: RRR, +S1,S2, RRR  ABDOMEN: SNTTP, ND x 4 q's  EXT: Warm, well perfused x 4  NEURO: AxOx3, No FND's noted  SKIN: No new breakdown or rashes noted                                           --------------------------------------------------------------    ASSESSMENT & PLAN    89 yo M PMHx of HTN, DM II, CAD s/p CABG, CVA, and Parkinson's disease was brought to ED for right hip after unwitnessed fall which occurred two days before presentation. Was found by his daughter on the floor - was awake and alert when he was found about 30-60 mins after the fall. The patient fell onto his right side and he has been unable to bear weight since due to rt hip pain. Family and pt isn't sure if mechanical or syncopal    Unwitnessed fall, presumably syncope  Resulting in right hip fracture, likely due to osteopenia  Echo shows septal dysergy, multiple valve dysfuction. Case d/w granddaughter who aids in decision making who agrees no cardiac intervention. Will d/c telemetry  EKG showed atrial fibrillation, LBBB, old  s/p MRI of the hip, discussed with orthopedics team  they will discuss surgical v nonsurgical options with the family. Family is leaning more towards surgery   Patient will be high risk for surgical procedure, however benefits of surgical intervention likely outweigh the risks   will ask for cardio for preop eval     Chronic Atrial Fibrillation/Flutter with RVR  [id number]  Continue Metoprolol 50 mg daily  was started on Eliquis 5mg Q12H. Will switch to therapeutic lovenox for now in case ortho and family decide on ORIF     CAD s/p CABG: Continue ASA, Metoprolol and Lipitor.   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  Eliquis started on this admission    DM II   - FS controlled    CKD III  - stable renal function.    Normocytic anemia & thrombocytopenia  Iron and folate deficiency  Continue ferrous sulfate and folic acid.     Parkinson's disease  - on Sinemet.     BPH  - on Tamsulosin and Finasteride.                            ALEXY PURDY 89y Male  MRN#: [id number]   CODE STATUS:________    Hospital Day: 9d    Pt is currently admitted with the primary diagnosis of     SUBJECTIVE  Hospital Course    Overnight events     Subjective complaints     Present Today:   - Cooper:  No [  ], Yes [   ] : Indication:     - Type of IV Access:       .. CVC/Piccline:  No [  ], Yes [   ] : Indication:       .. Midline: No [  ], Yes [   ] : Indication:                                             ----------------------------------------------------------  OBJECTIVE  PAST MEDICAL & SURGICAL HISTORY  Parkinson disease    Coronary artery disease    Chronic atrial fibrillation    Stroke, embolic    Benign prostate hyperplasia    History of iron deficiency    S/P CABG (coronary artery bypass graft)                                              -----------------------------------------------------------  ALLERGIES:  No Known Allergies                                            ------------------------------------------------------------    HOME MEDICATIONS  Home Medications:  aspirin 81 mg oral tablet: 1 tab(s) orally once a day (11 Jun 2020 15:21)  atorvastatin 40 mg oral tablet: 1 tab(s) orally once a day (21 Aug 2021 15:59)  carbidopa-levodopa 25 mg-100 mg oral tablet: 1 tab(s) orally 3 times a day (06 Jun 2020 22:09)  Creon 24,000 units oral delayed release capsule: 1 cap(s) orally 3 times a day (21 Aug 2021 16:07)  Dexilant 60 mg oral delayed release capsule: 1 cap(s) orally once a day (21 Aug 2021 16:06)  dicyclomine 10 mg oral capsule: 1 cap(s) orally once a day, As Needed (11 Jun 2020 14:56)  ferrous sulfate 325 mg (65 mg elemental iron) oral tablet: 1 tab(s) orally 3 times a day (06 Jun 2020 22:09)  finasteride 5 mg oral tablet: 1 tab(s) orally once a day (06 Jun 2020 22:09)  folic acid 1 mg oral tablet: 1 tab(s) orally once a day (06 Jun 2020 22:09)  gabapentin 300 mg oral tablet: 1 tab(s) orally once a day (21 Aug 2021 16:08)  Janumet 50 mg-500 mg oral tablet: 0.5 tab(s) orally once a day, As Needed (06 Jun 2020 22:09)  Metoprolol Succinate ER 25 mg oral tablet, extended release: 0.5 tab(s) orally once a day (21 Aug 2021 16:03)  tamsulosin 0.4 mg oral capsule: 1 cap(s) orally once a day (06 Jun 2020 22:09)  Vitamin D3 1250 mcg (50,000 intl units) oral capsule: 1 cap(s) orally once a week (21 Aug 2021 16:07)                           MEDICATIONS:  STANDING MEDICATIONS  apixaban 5 milliGRAM(s) Oral two times a day  aspirin enteric coated 81 milliGRAM(s) Oral daily  atorvastatin 40 milliGRAM(s) Oral at bedtime  bisacodyl 10 milliGRAM(s) Oral at bedtime  carbidopa/levodopa  25/100 1 Tablet(s) Oral three times a day  chlorhexidine 4% Liquid 1 Application(s) Topical <User Schedule>  ferrous    sulfate 325 milliGRAM(s) Oral three times a day  finasteride 5 milliGRAM(s) Oral daily  folic acid 1 milliGRAM(s) Oral daily  gabapentin 300 milliGRAM(s) Oral daily  metoprolol succinate ER 50 milliGRAM(s) Oral daily  pancrelipase  (CREON 12,000 Lipase Units) 2 Capsule(s) Oral three times a day with meals  pantoprazole    Tablet 40 milliGRAM(s) Oral before breakfast  polyethylene glycol 3350 17 Gram(s) Oral two times a day  tamsulosin 0.4 milliGRAM(s) Oral at bedtime    PRN MEDICATIONS  acetaminophen   Tablet .. 650 milliGRAM(s) Oral every 6 hours PRN  melatonin 3 milliGRAM(s) Oral at bedtime PRN  ondansetron Injectable 4 milliGRAM(s) IV Push every 8 hours PRN                                            ------------------------------------------------------------  VITAL SIGNS: Last 24 Hours  T(C): 36 (30 Aug 2021 05:21), Max: 36.2 (29 Aug 2021 14:32)  T(F): 96.8 (30 Aug 2021 05:21), Max: 97.1 (29 Aug 2021 14:32)  HR: 72 (30 Aug 2021 05:21) (72 - 74)  BP: 137/72 (30 Aug 2021 05:21) (125/68 - 137/72)  BP(mean): --  RR: 19 (30 Aug 2021 05:21) (19 - 19)  SpO2: --                                             --------------------------------------------------------------  LABS:                        10.4   6.09  )-----------( 147      ( 29 Aug 2021 06:37 )             31.9     08-29    138  |  102  |  24<H>  ----------------------------<  101<H>  4.7   |  26  |  1.0    Ca    9.0      29 Aug 2021 06:37  Mg     2.0     08-29    TPro  5.8<L>  /  Alb  3.2<L>  /  TBili  0.4  /  DBili  x   /  AST  13  /  ALT  <5  /  AlkPhos  109  08-29                                                  -------------------------------------------------------------  RADIOLOGY:                                            --------------------------------------------------------------    PHYSICAL EXAM:  GENERAL: NAD  HEAD:  Normocephalic  EYES:  conjunctiva and sclera clear  ENMT: Moist mucous membranes  NECK: Supple  NERVOUS SYSTEM:  Alert, awake  CHEST/LUNG: Good air exchange bilaterally, no wheeze  HEART: Regular rate and rhythm  No LLE                                           --------------------------------------------------------------    ASSESSMENT & PLAN    89 yo M PMHx of HTN, DM II, CAD s/p CABG, CVA, and Parkinson's disease was brought to ED for right hip after unwitnessed fall which occurred two days before presentation. Was found by his daughter on the floor - was awake and alert when he was found about 30-60 mins after the fall. The patient fell onto his right side and he has been unable to bear weight since due to rt hip pain. Family and pt isn't sure if mechanical or syncopal    Unwitnessed fall, presumably syncope  Resulting in right hip fracture, likely due to osteopenia  Echo shows septal dysergy, multiple valve dysfunction. Case d/w granddaughter who aids in decision making who agrees no cardiac intervention. Will d/c telemetry  EKG showed atrial fibrillation, LBBB, old  s/p MRI of the hip, discussed with orthopedics team  they will discuss surgical v nonsurgical options with the family. Family is leaning more towards surgery   Patient will be high risk for surgical procedure, however benefits of surgical intervention likely outweigh the risks   will ask for cardio for preop eval     Chronic Atrial Fibrillation/Flutter with RVR  [id number]  Continue Metoprolol 50 mg daily  was started on Eliquis 5mg Q12H. Will switch to therapeutic lovenox for now in case ortho and family decide on ORIF     CAD s/p CABG: Continue ASA, Metoprolol and Lipitor.   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  Eliquis started on this admission    DM II   - FS controlled    CKD III  - stable renal function.    Normocytic anemia & thrombocytopenia  Iron and folate deficiency  Continue ferrous sulfate and folic acid.     Parkinson's disease  - on Sinemet.     BPH  - on Tamsulosin and Finasteride.                            ALEXY PURDY 89y Male  MRN#: [id number]   CODE STATUS:________    Hospital Day: 9d    Pt is currently admitted with the primary diagnosis of Fall    SUBJECTIVE    Overnight events: No events overnight     Subjective complaints: He has no complaints today morning. Denies fevers, headaches, chest pain, SOB, abdominal pain.     Present Today:   - Cooper:  No [  ], Yes [   ] : Indication:     - Type of IV Access:       .. CVC/Piccline:  No [  ], Yes [   ] : Indication:       .. Midline: No [  ], Yes [   ] : Indication:                                             ----------------------------------------------------------  OBJECTIVE  PAST MEDICAL & SURGICAL HISTORY  Parkinson disease    Coronary artery disease    Chronic atrial fibrillation    Stroke, embolic    Benign prostate hyperplasia    History of iron deficiency    S/P CABG (coronary artery bypass graft)                                              -----------------------------------------------------------  ALLERGIES:  No Known Allergies                                            ------------------------------------------------------------    HOME MEDICATIONS  Home Medications:  aspirin 81 mg oral tablet: 1 tab(s) orally once a day (11 Jun 2020 15:21)  atorvastatin 40 mg oral tablet: 1 tab(s) orally once a day (21 Aug 2021 15:59)  carbidopa-levodopa 25 mg-100 mg oral tablet: 1 tab(s) orally 3 times a day (06 Jun 2020 22:09)  Creon 24,000 units oral delayed release capsule: 1 cap(s) orally 3 times a day (21 Aug 2021 16:07)  Dexilant 60 mg oral delayed release capsule: 1 cap(s) orally once a day (21 Aug 2021 16:06)  dicyclomine 10 mg oral capsule: 1 cap(s) orally once a day, As Needed (11 Jun 2020 14:56)  ferrous sulfate 325 mg (65 mg elemental iron) oral tablet: 1 tab(s) orally 3 times a day (06 Jun 2020 22:09)  finasteride 5 mg oral tablet: 1 tab(s) orally once a day (06 Jun 2020 22:09)  folic acid 1 mg oral tablet: 1 tab(s) orally once a day (06 Jun 2020 22:09)  gabapentin 300 mg oral tablet: 1 tab(s) orally once a day (21 Aug 2021 16:08)  Janumet 50 mg-500 mg oral tablet: 0.5 tab(s) orally once a day, As Needed (06 Jun 2020 22:09)  Metoprolol Succinate ER 25 mg oral tablet, extended release: 0.5 tab(s) orally once a day (21 Aug 2021 16:03)  tamsulosin 0.4 mg oral capsule: 1 cap(s) orally once a day (06 Jun 2020 22:09)  Vitamin D3 1250 mcg (50,000 intl units) oral capsule: 1 cap(s) orally once a week (21 Aug 2021 16:07)                           MEDICATIONS:  STANDING MEDICATIONS  apixaban 5 milliGRAM(s) Oral two times a day  aspirin enteric coated 81 milliGRAM(s) Oral daily  atorvastatin 40 milliGRAM(s) Oral at bedtime  bisacodyl 10 milliGRAM(s) Oral at bedtime  carbidopa/levodopa  25/100 1 Tablet(s) Oral three times a day  chlorhexidine 4% Liquid 1 Application(s) Topical <User Schedule>  ferrous    sulfate 325 milliGRAM(s) Oral three times a day  finasteride 5 milliGRAM(s) Oral daily  folic acid 1 milliGRAM(s) Oral daily  gabapentin 300 milliGRAM(s) Oral daily  metoprolol succinate ER 50 milliGRAM(s) Oral daily  pancrelipase  (CREON 12,000 Lipase Units) 2 Capsule(s) Oral three times a day with meals  pantoprazole    Tablet 40 milliGRAM(s) Oral before breakfast  polyethylene glycol 3350 17 Gram(s) Oral two times a day  tamsulosin 0.4 milliGRAM(s) Oral at bedtime    PRN MEDICATIONS  acetaminophen   Tablet .. 650 milliGRAM(s) Oral every 6 hours PRN  melatonin 3 milliGRAM(s) Oral at bedtime PRN  ondansetron Injectable 4 milliGRAM(s) IV Push every 8 hours PRN                                            ------------------------------------------------------------  VITAL SIGNS: Last 24 Hours  T(C): 36 (30 Aug 2021 05:21), Max: 36.2 (29 Aug 2021 14:32)  T(F): 96.8 (30 Aug 2021 05:21), Max: 97.1 (29 Aug 2021 14:32)  HR: 72 (30 Aug 2021 05:21) (72 - 74)  BP: 137/72 (30 Aug 2021 05:21) (125/68 - 137/72)  BP(mean): --  RR: 19 (30 Aug 2021 05:21) (19 - 19)  SpO2: --                                             --------------------------------------------------------------  LABS:                        10.4   6.09  )-----------( 147      ( 29 Aug 2021 06:37 )             31.9     08-29    138  |  102  |  24<H>  ----------------------------<  101<H>  4.7   |  26  |  1.0    Ca    9.0      29 Aug 2021 06:37  Mg     2.0     08-29    TPro  5.8<L>  /  Alb  3.2<L>  /  TBili  0.4  /  DBili  x   /  AST  13  /  ALT  <5  /  AlkPhos  109  08-29                                                  -------------------------------------------------------------  RADIOLOGY:                                            --------------------------------------------------------------    PHYSICAL EXAM:  GENERAL: Well developed, well nourished and in no acute distress. Resting comfortably in bed.  PULMONARY: Clear to auscultation bilaterally. No rales, rhonchi, or wheezing.  CARDIOVASCULAR: Regular rate and rhythm, S1-S2, no murmurs  GASTROINTESTINAL: Soft, non-tender, non-distended, no guarding.  SKIN/EXTREMITIES: decrease ROM in the right side   NEUROLOGIC/MUSCULOSKELETAL: AOx4                                         --------------------------------------------------------------    ASSESSMENT & PLAN    89 yo M PMHx of HTN, DM II, CAD s/p CABG, CVA, and Parkinson's disease was brought to ED for right hip after unwitnessed fall which occurred two days before presentation. Was found by his daughter on the floor - was awake and alert when he was found about 30-60 mins after the fall. The patient fell onto his right side and he has been unable to bear weight since due to rt hip pain. Family and pt isn't sure if mechanical or syncopal    Unwitnessed fall, presumably syncope  Resulting in right hip fracture, likely due to osteopenia  Echo shows septal dysergy, multiple valve dysfunction. Case d/w granddaughter who aids in decision making who agrees no cardiac intervention. Will d/c telemetry  EKG showed atrial fibrillation, LBBB, old  s/p MRI of the hip, discussed with orthopedics team  Per Orthopedics team adn family: No surgical intervention at the moment. For short term rehab on discharge. Can take a few steps. Moderate assist per PT.       Chronic Atrial Fibrillation/Flutter with RVR  [id number] 6  Continue Metoprolol 50 mg daily  was started on Eliquis 5mg Q12H. Will switch to therapeutic lovenox for now in case ortho and family decide on ORIF     CAD s/p CABG: Continue ASA, Metoprolol and Lipitor.   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  Eliquis started on this admission    DM II   - FS controlled    CKD III  - stable renal function.    Normocytic anemia & thrombocytopenia  Iron and folate deficiency  Continue ferrous sulfate and folic acid.     Parkinson's disease  - on Sinemet.     BPH  - on Tamsulosin and Finasteride.       Dispo: Short term rehab

## 2021-08-30 NOTE — PROGRESS NOTE ADULT - SUBJECTIVE AND OBJECTIVE BOX
ALEXY PURDY  89y Male    CHIEF COMPLAINT:    Patient is a 89y old  Male who presents with a chief complaint of fall (30 Aug 2021 13:24)      INTERVAL HPI/OVERNIGHT EVENTS:    Patient seen and examined. No acute events overnight. No active complaints     ROS: All other systems are negative.    Vital Signs:    T(F): 96.8 (08-30-21 @ 05:21), Max: 97.1 (08-29-21 @ 14:32)  HR: 72 (08-30-21 @ 05:21) (72 - 74)  BP: 137/72 (08-30-21 @ 05:21) (125/68 - 137/72)  RR: 19 (08-30-21 @ 05:21) (19 - 19)    POCT Blood Glucose.: 136 mg/dL (30 Aug 2021 12:06)  POCT Blood Glucose.: 108 mg/dL (30 Aug 2021 08:13)    PHYSICAL EXAM:    GENERAL:  NAD  SKIN: No rashes or lesions  HEENT: Atraumatic. Normocephalic.   NECK: Supple, No JVD.    PULMONARY: CTA B/L. No wheezing. No rales  CVS: Normal S1, S2. Rate and Rhythm are regular.    ABDOMEN/GI: Soft, Nontender, Nondistended; BS present  MSK:  No clubbing or cyanosis   NEUROLOGIC:  moves all extremities, + b/l UE resting tremor  PSYCH: Alert & oriented x 3    Consultant(s) Notes Reviewed:  [x ] YES  [ ] NO  Care Discussed with Consultants/Other Providers [ x] YES  [ ] NO    LABS:                        11.1   7.69  )-----------( 188      ( 30 Aug 2021 07:51 )             33.5     138  |  103  |  24<H>  ----------------------------<  124<H>  4.3   |  27  |  1.1    Ca    9.2      30 Aug 2021 07:51  Mg     2.0     08-30    TPro  6.5  /  Alb  3.6  /  TBili  0.4  /  DBili  x   /  AST  14  /  ALT  7   /  AlkPhos  116<H>  08-30    RADIOLOGY & ADDITIONAL TESTS:  Imaging or report Personally Reviewed:  [x] YES  [ ] NO  EKG reviewed: [x] YES  [ ] NO    Medications:  Standing  apixaban 5 milliGRAM(s) Oral two times a day  aspirin enteric coated 81 milliGRAM(s) Oral daily  atorvastatin 40 milliGRAM(s) Oral at bedtime  bisacodyl 10 milliGRAM(s) Oral at bedtime  carbidopa/levodopa  25/100 1 Tablet(s) Oral three times a day  chlorhexidine 4% Liquid 1 Application(s) Topical <User Schedule>  ferrous    sulfate 325 milliGRAM(s) Oral three times a day  finasteride 5 milliGRAM(s) Oral daily  folic acid 1 milliGRAM(s) Oral daily  gabapentin 300 milliGRAM(s) Oral daily  metoprolol succinate ER 50 milliGRAM(s) Oral daily  pancrelipase  (CREON 12,000 Lipase Units) 2 Capsule(s) Oral three times a day with meals  pantoprazole    Tablet 40 milliGRAM(s) Oral before breakfast  polyethylene glycol 3350 17 Gram(s) Oral two times a day  tamsulosin 0.4 milliGRAM(s) Oral at bedtime    PRN Meds  acetaminophen   Tablet .. 650 milliGRAM(s) Oral every 6 hours PRN  melatonin 3 milliGRAM(s) Oral at bedtime PRN  ondansetron Injectable 4 milliGRAM(s) IV Push every 8 hours PRN

## 2021-08-30 NOTE — PROGRESS NOTE ADULT - ASSESSMENT
6 yo M PMHx of HTN, DM II, CAD s/p CABG, CVA, and Parkinson's disease was brought to ED for right hip after unwitnessed fall which occurred two days before presentation. Was found by his daughter on the floor - was awake and alert when he was found about 30-60 mins after the fall. The patient fell onto his right side and he has been unable to bear weight since due to rt hip pain. Family and pt isn't sure if mechanical or syncopal    Unwitnessed fall, presumably syncope  Resulting in right hip fracture, likely due to osteopenia  Echo shows septal dysergy, multiple valve dysfuction. Case d/w granddaughter who aids in decision making who agrees no cardiac intervention  EKG showed atrial fibrillation, LBBB, old  s/p MRI of the hip, discussed with orthopedics team and the family, they decided on non operative management at this time  WBAT  patient to be discharged to SNF    Chronic Atrial Fibrillation/Flutter with RVR  [id number]  Continue Metoprolol 50 mg daily and eliquis 5mg Q12H     CAD s/p CABG: Continue ASA, Metoprolol and Lipitor.   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  Eliquis started on this admission    DM II   - FS controlled    CKD III  - stable renal function.    Normocytic anemia & thrombocytopenia  Iron and folate deficiency  Continue ferrous sulfate and folic acid.     Parkinson's disease  - on Sinemet.     BPH  - on Tamsulosin and Finasteride.     #Progress Note Handoff:  Pending (specify)  Family discussion: plan of care discussed with patient and granddaughter Flor  Disposition: SNF today (patient unvaccinated, family refusing vaccine at this time )

## 2021-08-30 NOTE — DISCHARGE NOTE PROVIDER - HOSPITAL COURSE
89 yo M PMHx of HTN, DM II, CAD s/p CABG, CVA, and Parkinson's disease was brought to ED for right hip after unwitnessed fall which occurred two days before presentation. Was found by his daughter on the floor - was awake and alert when he was found about 30-60 mins after the fall. The patient fell onto his right side and he has been unable to bear weight since due to rt hip pain. Family and pt isn't sure if mechanical or syncopal. He was admitted for further workup of the fall. He was found to have a right hip fracture likely due to Osteopenia.  After discussion with Orthopedics team, grand-daughter Flor and family, they decided on non operative management at this time and to continue with short term rehab. Echo shows septal dysergy, multiple valve dysfunction. Case d/w granddaughter who aids in decision making who agrees no cardiac intervention. EKG showed atrial fibrillation, LBBB, old. Eliquis was started on this admission. All other chronic medical issues were stable. He is stable for discharge today to short term rehab.

## 2021-08-30 NOTE — DISCHARGE NOTE PROVIDER - NSDCMRMEDTOKEN_GEN_ALL_CORE_FT
aspirin 81 mg oral tablet: 1 tab(s) orally once a day  atorvastatin 40 mg oral tablet: 1 tab(s) orally once a day  carbidopa-levodopa 25 mg-100 mg oral tablet: 1 tab(s) orally 3 times a day  Creon 24,000 units oral delayed release capsule: 1 cap(s) orally 3 times a day  Dexilant 60 mg oral delayed release capsule: 1 cap(s) orally once a day  dicyclomine 10 mg oral capsule: 1 cap(s) orally once a day, As Needed  ferrous sulfate 325 mg (65 mg elemental iron) oral tablet: 1 tab(s) orally 3 times a day  finasteride 5 mg oral tablet: 1 tab(s) orally once a day  folic acid 1 mg oral tablet: 1 tab(s) orally once a day  gabapentin 300 mg oral tablet: 1 tab(s) orally once a day  Janumet 50 mg-500 mg oral tablet: 0.5 tab(s) orally once a day, As Needed  Metoprolol Succinate ER 25 mg oral tablet, extended release: 0.5 tab(s) orally once a day  Plavix 75 mg oral tablet: 1 tab(s) orally once a day   tamsulosin 0.4 mg oral capsule: 1 cap(s) orally once a day  Vitamin D3 1250 mcg (50,000 intl units) oral capsule: 1 cap(s) orally once a week   acetaminophen 325 mg oral tablet: 2 tab(s) orally every 6 hours, As needed, Temp greater or equal to 38.5C (101.3F), Mild Pain (1 - 3)  apixaban 5 mg oral tablet: 1 tab(s) orally 2 times a day  aspirin 81 mg oral tablet: 1 tab(s) orally once a day  atorvastatin 40 mg oral tablet: 1 tab(s) orally once a day  carbidopa-levodopa 25 mg-100 mg oral tablet: 1 tab(s) orally 3 times a day  Creon 24,000 units oral delayed release capsule: 1 cap(s) orally 3 times a day  Dexilant 60 mg oral delayed release capsule: 1 cap(s) orally once a day  dicyclomine 10 mg oral capsule: 1 cap(s) orally once a day, As Needed  ferrous sulfate 325 mg (65 mg elemental iron) oral tablet: 1 tab(s) orally 3 times a day  finasteride 5 mg oral tablet: 1 tab(s) orally once a day  folic acid 1 mg oral tablet: 1 tab(s) orally once a day  gabapentin 300 mg oral tablet: 1 tab(s) orally once a day  Metoprolol Succinate ER 25 mg oral tablet, extended release: 0.5 tab(s) orally once a day  polyethylene glycol 3350 oral powder for reconstitution: 17 gram(s) orally once a day  tamsulosin 0.4 mg oral capsule: 1 cap(s) orally once a day  Vitamin D3 1250 mcg (50,000 intl units) oral capsule: 1 cap(s) orally once a week

## 2021-08-30 NOTE — DISCHARGE NOTE NURSING/CASE MANAGEMENT/SOCIAL WORK - PATIENT PORTAL LINK FT
You can access the FollowMyHealth Patient Portal offered by [facility] by registering at the following website: [email or web address]. By joining [employer]’s FollowMyHealth portal, you will also be able to view your health information using other applications (apps) compatible with our system.

## 2021-08-30 NOTE — DISCHARGE NOTE NURSING/CASE MANAGEMENT/SOCIAL WORK - NSDCPEFALRISK_GEN_ALL_CORE
For information on Fall & injury Prevention, visit [email or web address]

## 2021-09-08 DIAGNOSIS — D50.9 IRON DEFICIENCY ANEMIA, UNSPECIFIED: ICD-10-CM

## 2021-09-08 DIAGNOSIS — M79.671 PAIN IN RIGHT FOOT: ICD-10-CM

## 2021-09-08 DIAGNOSIS — Z86.73 PERSONAL HISTORY OF TRANSIENT ISCHEMIC ATTACK (TIA), AND CEREBRAL INFARCTION WITHOUT RESIDUAL DEFICITS: ICD-10-CM

## 2021-09-08 DIAGNOSIS — N40.0 BENIGN PROSTATIC HYPERPLASIA WITHOUT LOWER URINARY TRACT SYMPTOMS: ICD-10-CM

## 2021-09-08 DIAGNOSIS — R55 SYNCOPE AND COLLAPSE: ICD-10-CM

## 2021-09-08 DIAGNOSIS — M25.559 PAIN IN UNSPECIFIED HIP: ICD-10-CM

## 2021-09-08 DIAGNOSIS — R63.0 ANOREXIA: ICD-10-CM

## 2021-09-08 DIAGNOSIS — I48.20 CHRONIC ATRIAL FIBRILLATION, UNSPECIFIED: ICD-10-CM

## 2021-09-08 DIAGNOSIS — M25.561 PAIN IN RIGHT KNEE: ICD-10-CM

## 2021-09-08 DIAGNOSIS — N18.30 CHRONIC KIDNEY DISEASE, STAGE 3 UNSPECIFIED: ICD-10-CM

## 2021-09-08 DIAGNOSIS — D69.6 THROMBOCYTOPENIA, UNSPECIFIED: ICD-10-CM

## 2021-09-08 DIAGNOSIS — E53.8 DEFICIENCY OF OTHER SPECIFIED B GROUP VITAMINS: ICD-10-CM

## 2021-09-08 DIAGNOSIS — M85.88 OTHER SPECIFIED DISORDERS OF BONE DENSITY AND STRUCTURE, OTHER SITE: ICD-10-CM

## 2021-09-08 DIAGNOSIS — K21.9 GASTRO-ESOPHAGEAL REFLUX DISEASE WITHOUT ESOPHAGITIS: ICD-10-CM

## 2021-09-08 DIAGNOSIS — M79.672 PAIN IN LEFT FOOT: ICD-10-CM

## 2021-09-08 DIAGNOSIS — R63.8 OTHER SYMPTOMS AND SIGNS CONCERNING FOOD AND FLUID INTAKE: ICD-10-CM
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
## 2021-09-08 DIAGNOSIS — I34.0 NONRHEUMATIC MITRAL (VALVE) INSUFFICIENCY: ICD-10-CM
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
## 2021-09-08 DIAGNOSIS — I07.1 RHEUMATIC TRICUSPID INSUFFICIENCY: ICD-10-CM

## 2021-09-08 DIAGNOSIS — I44.7 LEFT BUNDLE-BRANCH BLOCK, UNSPECIFIED: ICD-10-CM

## 2021-09-08 DIAGNOSIS — I25.10 ATHEROSCLEROTIC HEART DISEASE OF NATIVE CORONARY ARTERY WITHOUT ANGINA PECTORIS: ICD-10-CM

## 2021-09-08 DIAGNOSIS — G20 PARKINSON'S DISEASE: ICD-10-CM

## 2021-09-08 DIAGNOSIS — Z87.891 PERSONAL HISTORY OF NICOTINE DEPENDENCE: ICD-10-CM

## 2021-09-08 DIAGNOSIS — M84.651A PATHOLOGICAL FRACTURE IN OTHER DISEASE, RIGHT FEMUR, INITIAL ENCOUNTER FOR FRACTURE: ICD-10-CM

## 2021-09-08 DIAGNOSIS — Z53.20 PROCEDURE AND TREATMENT NOT CARRIED OUT BECAUSE OF PATIENT'S DECISION FOR UNSPECIFIED REASONS: ICD-10-CM

## 2021-09-08 DIAGNOSIS — K59.00 CONSTIPATION, UNSPECIFIED: ICD-10-CM
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
## 2021-09-08 DIAGNOSIS — Z79.84 LONG TERM (CURRENT) USE OF ORAL HYPOGLYCEMIC DRUGS: ICD-10-CM

## 2021-09-08 DIAGNOSIS — Z66 DO NOT RESUSCITATE: ICD-10-CM

## 2021-09-08 DIAGNOSIS — E11.22 TYPE 2 DIABETES MELLITUS WITH DIABETIC CHRONIC KIDNEY DISEASE: ICD-10-CM

## 2024-03-17 NOTE — ED ADULT NURSE REASSESSMENT NOTE - NS ED NURSE REASSESS COMMENT FT1
EKG done due to alterations in heart rate on monitor. Resident @ *3001 advised of findings, EKG order to be placed. EKG scanned in. Dr. Bell also made aware as he was in ER at time. No new orders at this time. Yes